# Patient Record
Sex: FEMALE | Race: WHITE | NOT HISPANIC OR LATINO | Employment: PART TIME | URBAN - METROPOLITAN AREA
[De-identification: names, ages, dates, MRNs, and addresses within clinical notes are randomized per-mention and may not be internally consistent; named-entity substitution may affect disease eponyms.]

---

## 2017-07-06 ENCOUNTER — ALLSCRIPTS OFFICE VISIT (OUTPATIENT)
Dept: OTHER | Facility: OTHER | Age: 52
End: 2017-07-06

## 2017-07-11 ENCOUNTER — GENERIC CONVERSION - ENCOUNTER (OUTPATIENT)
Dept: OTHER | Facility: OTHER | Age: 52
End: 2017-07-11

## 2017-07-11 LAB
A/G RATIO (HISTORICAL): 1.6 (ref 1.2–2.2)
ALBUMIN SERPL BCP-MCNC: 4.3 G/DL (ref 3.5–5.5)
ALP SERPL-CCNC: 97 IU/L (ref 39–117)
ALT SERPL W P-5'-P-CCNC: 23 IU/L (ref 0–32)
AST SERPL W P-5'-P-CCNC: 26 IU/L (ref 0–40)
BASOPHILS # BLD AUTO: 0 %
BASOPHILS # BLD AUTO: 0 X10E3/UL (ref 0–0.2)
BILIRUB SERPL-MCNC: 0.5 MG/DL (ref 0–1.2)
BUN SERPL-MCNC: 14 MG/DL (ref 6–24)
BUN/CREA RATIO (HISTORICAL): 14 (ref 9–23)
CALCIUM SERPL-MCNC: 9.4 MG/DL (ref 8.7–10.2)
CHLORIDE SERPL-SCNC: 98 MMOL/L (ref 96–106)
CHOLEST SERPL-MCNC: 254 MG/DL (ref 100–199)
CHOLEST/HDLC SERPL: 4.1 RATIO UNITS (ref 0–4.4)
CO2 SERPL-SCNC: 25 MMOL/L (ref 18–29)
CREAT SERPL-MCNC: 0.97 MG/DL (ref 0.57–1)
DEPRECATED RDW RBC AUTO: 13.7 % (ref 12.3–15.4)
EGFR AFRICAN AMERICAN (HISTORICAL): 77 ML/MIN/1.73
EGFR-AMERICAN CALC (HISTORICAL): 66 ML/MIN/1.73
EOSINOPHIL # BLD AUTO: 0.1 X10E3/UL (ref 0–0.4)
EOSINOPHIL # BLD AUTO: 3 %
GLUCOSE SERPL-MCNC: 100 MG/DL (ref 65–99)
HCT VFR BLD AUTO: 43.2 % (ref 34–46.6)
HDLC SERPL-MCNC: 62 MG/DL
HGB BLD-MCNC: 14.6 G/DL (ref 11.1–15.9)
IMM.GRANULOCYTES (CD4/8) (HISTORICAL): 0 %
IMM.GRANULOCYTES (CD4/8) (HISTORICAL): 0 X10E3/UL (ref 0–0.1)
INTERPRETATION (HISTORICAL): NORMAL
LDLC SERPL CALC-MCNC: 161 MG/DL (ref 0–99)
LYMPHOCYTES # BLD AUTO: 1.2 X10E3/UL (ref 0.7–3.1)
LYMPHOCYTES # BLD AUTO: 26 %
MCH RBC QN AUTO: 29.2 PG (ref 26.6–33)
MCHC RBC AUTO-ENTMCNC: 33.8 G/DL (ref 31.5–35.7)
MCV RBC AUTO: 86 FL (ref 79–97)
MONOCYTES # BLD AUTO: 0.3 X10E3/UL (ref 0.1–0.9)
MONOCYTES (HISTORICAL): 7 %
NEUTROPHILS # BLD AUTO: 2.9 X10E3/UL (ref 1.4–7)
NEUTROPHILS # BLD AUTO: 64 %
PLATELET # BLD AUTO: 240 X10E3/UL (ref 150–379)
POTASSIUM SERPL-SCNC: 4.7 MMOL/L (ref 3.5–5.2)
RBC (HISTORICAL): 5 X10E6/UL (ref 3.77–5.28)
SODIUM SERPL-SCNC: 139 MMOL/L (ref 134–144)
TOT. GLOBULIN, SERUM (HISTORICAL): 2.7 G/DL (ref 1.5–4.5)
TOTAL PROTEIN (HISTORICAL): 7 G/DL (ref 6–8.5)
TRIGL SERPL-MCNC: 153 MG/DL (ref 0–149)
VLDLC SERPL CALC-MCNC: 31 MG/DL (ref 5–40)
WBC # BLD AUTO: 4.5 X10E3/UL (ref 3.4–10.8)

## 2017-07-12 ENCOUNTER — GENERIC CONVERSION - ENCOUNTER (OUTPATIENT)
Dept: OTHER | Facility: OTHER | Age: 52
End: 2017-07-12

## 2017-11-13 DIAGNOSIS — Z12.31 ENCOUNTER FOR SCREENING MAMMOGRAM FOR MALIGNANT NEOPLASM OF BREAST: ICD-10-CM

## 2017-11-22 ENCOUNTER — HOSPITAL ENCOUNTER (OUTPATIENT)
Dept: RADIOLOGY | Facility: HOSPITAL | Age: 52
Discharge: HOME | End: 2017-11-22
Attending: INTERNAL MEDICINE
Payer: COMMERCIAL

## 2017-11-22 ENCOUNTER — GENERIC CONVERSION - ENCOUNTER (OUTPATIENT)
Dept: OTHER | Facility: OTHER | Age: 52
End: 2017-11-22

## 2017-11-22 DIAGNOSIS — Z12.31 ENCOUNTER FOR SCREENING MAMMOGRAM FOR MALIGNANT NEOPLASM OF BREAST: ICD-10-CM

## 2017-11-22 PROCEDURE — G0202 SCR MAMMO BI INCL CAD: HCPCS

## 2018-01-11 NOTE — RESULT NOTES
Discussion/Summary   Cholesterol is high (LDL should be less than 130)  Other bloodwork is normal  Please follow low fat diet and exercise  - Dr Robin Sarmiento     Verified Results  (1) CBC/PLT/DIFF [id number] 10:52AM Brit Zaragozato Babar     Test Name Result Flag Reference   WBC 4 5 x10E3/uL  3 4-10 8   RBC 5 00 x10E6/uL  3 77-5 28   Hemoglobin 14 6 g/dL  11 1-15 9   Hematocrit 43 2 %  34 0-46  6   MCV 86 fL  79-97   MCH 29 2 pg  26 6-33 0   MCHC 33 8 g/dL  31 5-35 7   RDW 13 7 %  12 3-15 4   Platelets 596 S24I0/RH  150-379   Neutrophils 64 %     Lymphs 26 %     Monocytes 7 %     Eos 3 %     Basos 0 %     Neutrophils (Absolute) 2 9 x10E3/uL  1 4-7 0   Lymphs (Absolute) 1 2 x10E3/uL  0 7-3 1   Monocytes(Absolute) 0 3 x10E3/uL  0 1-0 9   Eos (Absolute) 0 1 x10E3/uL  0 0-0 4   Baso (Absolute) 0 0 x10E3/uL  0 0-0 2   Immature Granulocytes 0 %     Immature Grans (Abs) 0 0 x10E3/uL  0 0-0 1     (1) COMPREHENSIVE METABOLIC PANEL [id number] 39:60RD Brit Zaragozato Babar     Test Name Result Flag Reference   Glucose, Serum 100 mg/dL H 65-99   BUN 14 mg/dL  6-24   Creatinine, Serum 0 97 mg/dL  0 57-1 00   BUN/Creatinine Ratio 14  9-23   Sodium, Serum 139 mmol/L  134-144   Potassium, Serum 4 7 mmol/L  3 5-5 2   Chloride, Serum 98 mmol/L     Carbon Dioxide, Total 25 mmol/L  18-29   Calcium, Serum 9 4 mg/dL  8 7-10 2   Protein, Total, Serum 7 0 g/dL  6 0-8 5   Albumin, Serum 4 3 g/dL  3 5-5 5   Globulin, Total 2 7 g/dL  1 5-4 5   A/G Ratio 1 6  1 2-2 2   Bilirubin, Total 0 5 mg/dL  0 0-1 2   Alkaline Phosphatase, S 97 IU/L     AST (SGOT) 26 IU/L  0-40   ALT (SGPT) 23 IU/L  0-32   eGFR If NonAfricn Am 66 mL/min/1 73  >59   eGFR If Africn Am 77 mL/min/1 73  >59     (1) LIPID PANEL, FASTING [id number] 10:52AM Adan Zaragoza     Test Name Result Flag Reference   Cholesterol, Total 254 mg/dL H 100-199   Triglycerides 153 mg/dL H 0-149   HDL Cholesterol 62 mg/dL  >39   VLDL Cholesterol Cecil 31 mg/dL  5-40   LDL Cholesterol Calc 161 mg/dL H 0-99   T  Chol/HDL Ratio 4 1 ratio units  0 0-4 4   T  Chol/HDL Ratio                                                             Men  Women                                               1/2 Avg  Risk  3 4    3 3                                                   Avg Risk  5 0    4 4                                                2X Avg  Risk  9 6    7 1                                                3X Avg  Risk 23 4   11 0     Elvira Scherer) Cardiovascular Risk Assessment [id number] 10:52AM Humaira Zaragoza Old     Test Name Result Flag Reference   Interpretation Note     Supplement report is available  PDF Image

## 2018-01-13 NOTE — RESULT NOTES
Verified Results  * MAMMO SCREENING BILATERAL W CAD [id number] 08:27AM Laura Colon Order Number: [id number]    Order Number: [id number]     Test Name Result Flag Reference   MAMMO SCREENING BILATERAL W CAD (Report)     Patient History:   Patient is postmenopausal    Patient has never smoked  Patient's BMI is 31 5  Reason for exam: screening (asymptomatic)  Screening     Mammo Screening Bilateral W CAD: November 12, 2016 - Check In #:    [de-identified]   Bilateral CC and MLO view(s) were taken  Technologist: RT lBake(R)(M)   Prior study comparison: November 11, 2015, [facility] screening    bilateral digital mike performed at [place] [address]  November 4, 2014, bilateral screening mammogram    performed at [place] [address]  October 15,    2013, bilateral screening mammogram performed at [place] [address]  October 13, 2012, bilateral screening    mammogram performed at [place] [address]  April 9, 2012, right breast diagnostic mammogram performed at [place] [address]  October 10, 2011, right breast   screening mammogram performed at [place] [address]  October 9, 2010, bilateral screening mammogram performed   at [place] [address]  September 21, 2009,    bilateral screening mammogram performed at [place] [address]  The breast tissue is almost entirely fat  Bilateral digital    mammography is performed  No dominant soft tissue mass,    architectural distortion or suspicious calcifications are noted  The skin and nipple contours are within normal limits  Scattered benign appearing calcifications are noted  Asymmetric    tissue in the right mid upper breast is stable  No evidence of    malignancy  No significant changes when compared to prior    studies  1  No evidence of malignancy     2  No significant change when compared with the prior study  ASSESSMENT: BiRad:2 - Benign     Recommendation:   Routine screening mammogram of both breasts in 1 year  A reminder letter will be scheduled   Analyzed by CAD     8-10% of cancers will be missed on mammography  Management of a    palpable abnormality must be based on clinical grounds  Patients   will be notified of their results via letter from our facility  Accredited by [employer] and FDA       Transcription Location: [facility] 98: [id number]     Risk Value(s):   Nishanter-Cuzick 10 Year: 2 867%, Tyrer-Cuzibertha Lifetime: 10 635%,    Myriad Table: 1 5%, RADHA 5 Year: 1 1%, NCI Lifetime: 8 6%   Signed by:   Mi Palacios MD   11/14/16       Discussion/Summary   Your mammogram is normal, please repeat yearly  - Dr Daily Hamlin

## 2018-01-14 VITALS
RESPIRATION RATE: 20 BRPM | SYSTOLIC BLOOD PRESSURE: 130 MMHG | HEIGHT: 65 IN | HEART RATE: 80 BPM | TEMPERATURE: 96.8 F | DIASTOLIC BLOOD PRESSURE: 86 MMHG

## 2018-01-14 NOTE — RESULT NOTES
Discussion/Summary   Your mammogram is normal, please repeat yearly  - Dr Carmita Milian CAD 22Nov2017 09:59AM Jeffry Madera Order Number: [id number]    - Patient Instructions: To schedule this appointment, please contact Central Scheduling at [phone]  Do not wear any perfume, powder, lotion or deodorant on breast or underarm area  Please bring your doctors order, referral (if needed) and insurance information with you on the day of the test  Failure to bring this information may result in this test being rescheduled  Arrive 15 minutes prior to your appointment time to register  On the day of your test, please bring any prior mammogram or breast studies with you that were not performed at a [facility]  Failure to bring prior exams may result in your test needing to be rescheduled  Test Name Result Flag Reference   MAMMO SCREENING BILATERAL W CAD (Report)     Patient History:   Patient is postmenopausal    Patient has never smoked  Patient's BMI is 31 5  Reason for exam: screening, asymptomatic  Screening     Mammo Screening Bilateral W CAD: November 22, 2017 - Check In #:    [de-identified]   Bilateral CC and MLO view(s) were taken  Technologist: Marely Ngo   Prior study comparison: November 12, 2016, mammo screening    bilateral W CAD performed at [place] [address]  November 11, 2015, [facility] screening bilateral digital mike    performed at [place] [address]  November 4, 2014, bilateral screening mammogram performed at [place] [address]  October 15, 2013, bilateral screening    mammogram performed at [place] [address]  October 13, 2012, bilateral screening mammogram performed at [place] [address]       The breast tissue is almost entirely fat in the left breast     There are scattered fibroglandular densities in the right breast    No dominant soft tissue mass, architectural distortion or    suspicious calcifications are noted  The skin and nipple    contours are within normal limits  No evidence of malignancy  No significant change when compared to the prior studies  1  No evidence of malignancy  2  No significant change when compared with the prior study  ACR BI-RADSï¾® Assessments: BiRad:1 - Negative     Recommendation:   Routine screening mammogram of both breasts in 1 year  Analyzed by CAD     The patient is scheduled in a reminder system for screening    mammography  8-10% of cancers will be missed on mammography  Management of a    palpable abnormality must be based on clinical grounds  Patients   will be notified of their results via letter from our facility  Accredited by [employer] and FDA  Transcription Location: [facility] 98: [id number]     Risk Value(s):   Tyrer-Cuzick 10 Year: 2 900%, Tyrer-Cuzick Lifetime: 10 300%,    Myriad Table: 1 5%, RADHA 5 Year: 1 2%, NCI Lifetime: 8 5%   Signed by:   Chasidy Arias MD   11/22/17       Plan  Visit for screening mammogram    · * MAMMO SCREENING BILATERAL W CAD ; every 1 year;  Last 80EEY5832;  Status:Active

## 2018-01-16 NOTE — PROGRESS NOTES
Assessment    1  Encounter for preventive health examination (V70 0) (Z00 00)   2  Adult BMI 34 0-34 9 kg/sq m (V85 34) (Z68 34)   3  Postgastrectomy malabsorption (579 3) (K91 2,Z90 3)    Plan  Benign essential hypertension    · (1) CBC/PLT/DIFF; Status:Active; Requested for:54Inw6393;    · (1) COMPREHENSIVE METABOLIC PANEL; Status:Active; Requested for:73Sar1670;    · (1) LIPID PANEL, FASTING; Status:Active; Requested for:73Xrm7191;    · Follow-up visit in 6 months Evaluation and Treatment  Follow-up  Status: Hold For -  Scheduling  Requested for: 84FHD7470  Menopausal disorder    · (1) FSH; Status:Active; Requested for:60Knk2688;    · (1) LH (LEUTINIZING HORMONE); Status:Active; Requested for:37Eby6656;   Postgastrectomy malabsorption    · (1) IRON PANEL; Status:Active; Requested for:56Kbd9077;    · (1) VITAMIN B12; Status:Active; Requested for:33Yow0952;    · (1) VITAMIN D 25-HYDROXY; Status:Active; Requested for:40Tme1843;   Visit for screening mammogram    · * MAMMO SCREENING BILATERAL W CAD; Status:Hold For - Scheduling; Requested  for:57Ytd3448;     Discussion/Summary  health maintenance visit Currently, she eats a healthy diet and has an inadequate exercise regimen  cervical cancer screening is managed by Dr Sylvia Muhammad Breast cancer screening: the risks and benefits of breast cancer screening were discussed, monthly self breast exam was advised and mammogram has been ordered  Colorectal cancer screening: the patient declines colorectal cancer screening  The immunizations are up to date  Advice and education were given regarding nutrition, aerobic exercise, weight loss, calcium supplements and vitamin D supplements  Patient discussion: discussed with the patient  Chief Complaint  cpe      History of Present Illness  HM, Adult Female: The patient is being seen for a health maintenance evaluation  The last health maintenance visit was 12 month(s) ago  General Health:  The patient's health since the last visit is described as good  She has regular dental visits  She denies vision problems  She denies hearing loss  Immunizations status: up to date  Lifestyle:  She consumes a diverse and healthy diet  She has weight concerns  She exercises regularly  She exercises 3 or more times per week  She does not use tobacco  She denies alcohol use  She denies drug use  Reproductive health: the patient is postmenopausal    Screening:      Review of Systems    Constitutional: No fever, no chills, feels well, no tiredness, no recent weight gain or weight loss  Eyes: No complaints of eye pain, no red eyes, no eyesight problems, no discharge, no dry eyes, no itching of eyes  ENT: no complaints of earache, no loss of hearing, no nose bleeds, no nasal discharge, no sore throat, no hoarseness  Cardiovascular: No complaints of slow heart rate, no fast heart rate, no chest pain, no palpitations, no leg claudication, no lower extremity edema  Respiratory: No complaints of shortness of breath, no wheezing, no cough, no SOB on exertion, no orthopnea, no PND  Gastrointestinal: No complaints of abdominal pain, no constipation, no nausea or vomiting, no diarrhea, no bloody stools  Genitourinary: No complaints of dysuria, no incontinence, no pelvic pain, no dysmenorrhea, no vaginal discharge or bleeding  Musculoskeletal: No complaints of arthralgias, no myalgias, no joint swelling or stiffness, no limb pain or swelling  Integumentary: No complaints of skin rash or lesions, no itching, no skin wounds, no breast pain or lump  Neurological: No complaints of headache, no confusion, no convulsions, no numbness, no dizziness or fainting, no tingling, no limb weakness, no difficulty walking  Psychiatric: Not suicidal, no sleep disturbance, no anxiety or depression, no change in personality, no emotional problems     Endocrine: No complaints of proptosis, no hot flashes, no muscle weakness, no deepening of the voice, no feelings of weakness  Hematologic/Lymphatic: No complaints of swollen glands, no swollen glands in the neck, does not bleed easily, does not bruise easily  Active Problems    1  Abnormal electrocardiogram (794 31) (R94 31)   2  Anxiety disorder (300 00) (F41 9)   3  Benign essential hypertension (401 1) (I10)   4  Contraceptives (V25 02)   5  Morbid or severe obesity due to excess calories (278 01) (E66 01)   6  Need for vaccine for TD (tetanus-diphtheria) (V06 5) (Z23)   7  Obstructive sleep apnea (327 23) (G47 33)   8  Postgastrectomy Malabsorption (579 3)   9  Pre-operative cardiovascular examination (V72 81) (Z01 810)   10  Visit for screening mammogram (V76 12) (Z12 31)    Past Medical History    · History of acute conjunctivitis (V12 49) (Z86 69)    Surgical History    · Contraceptives (V25 02)    Family History  Mother    · Family history of Hypertension (401 9) (I10)  Father    · Family history of renal failure (V18 69) (Z84 1)   · Family history of Hypertension (401 9) (I10)   · Family history of Stomach cancer (151 9) (C16 9)    Social History    · Never A Smoker    Current Meds   1  Escitalopram Oxalate 10 MG Oral Tablet; TAKE 1 TABLET DAILY; Therapy: 63CTB1639 to (Evaluate:59Asu2334)  Requested for: 89ERK5030; Last   Rx:15Jun2016 Ordered   2  Metoprolol Tartrate 25 MG Oral Tablet; take one tablet by mouth every day; Therapy: 09XDF0680 to (Evaluate:79Dau0871)  Requested for: 71FUM9089; Last   Rx:15Jun2016 Ordered    Allergies    1  No Known Drug Allergies    Vitals   Recorded: 30LPE2945 09:13AM   Temperature 98 7 F   Heart Rate 84   Respiration 18   Systolic 875   Diastolic 86   Height 5 ft 5 in   Weight 209 lb    BMI Calculated 34 78   BSA Calculated 2 02     Physical Exam    Constitutional   General appearance: No acute distress, well appearing and well nourished  Eyes   Conjunctiva and lids: No swelling, erythema or discharge  Pupils and irises: Equal, round, reactive to light      Ears, Nose, Mouth, and Throat   External inspection of ears and nose: Normal     Otoscopic examination: Tympanic membranes translucent with normal light reflex  Canals patent without erythema  Hearing: Normal     Nasal mucosa, septum, and turbinates: Normal without edema or erythema  Lips, teeth, and gums: Normal, good dentition  Oropharynx: Normal with no erythema, edema, exudate or lesions  Neck   Neck: Supple, symmetric, trachea midline, no masses  Thyroid: Normal, no thyromegaly  Pulmonary   Respiratory effort: No increased work of breathing or signs of respiratory distress  Percussion of chest: Normal     Palpation of chest: Normal     Auscultation of lungs: Clear to auscultation  Cardiovascular   Palpation of heart: Normal PMI, no thrills  Auscultation of heart: Normal rate and rhythm, normal S1 and S2, no murmurs  Carotid pulses: 2+ bilaterally  Abdominal aorta: Normal     Femoral pulses: 2+ bilaterally  Pedal pulses: 2+ bilaterally  Examination of extremities for edema and/or varicosities: Normal     Abdomen   Abdomen: Non-tender, no masses  Liver and spleen: No hepatomegaly or splenomegaly  Examination for hernias: No hernia appreciated  Lymphatic   Palpation of lymph nodes in neck: No lymphadenopathy  Palpation of lymph nodes in axillae: No lymphadenopathy  Palpation of lymph nodes in groin: No lymphadenopathy  Palpation of lymph nodes in other areas: No lymphadenopathy  Musculoskeletal   Gait and station: Normal     Digits and nails: Normal without clubbing or cyanosis  Joints, bones, and muscles: Normal     Range of motion: Normal     Stability: Normal     Muscle strength/tone: Normal     Skin   Skin and subcutaneous tissue: Normal without rashes or lesions  Palpation of skin and subcutaneous tissue: Normal turgor  Neurologic   Cranial nerves: Cranial nerves II-XII intact  Reflexes: 2+ and symmetric  Sensation: No sensory loss      Psychiatric Judgment and insight: Normal     Orientation to person, place, and time: Normal     Recent and remote memory: Intact  Mood and affect: Normal        Procedure    Procedure: Indication: routine screening     Results: 20/20 in both eyes without corrective device, 20/20 in the right eye without corrective device, 20/20 in the left eye without corrective device   Color vision was and the results were normal       Signatures   Electronically signed by : MELLO Hwang ; Jul 7 2016  9:37AM EST                       (Author)

## 2018-01-17 NOTE — RESULT NOTES
Verified Results  (1) CBC/PLT/DIFF [id number] 09:07AM Ryanne Richard     Test Name Result Flag Reference   WBC 4 8 x10E3/uL  3 4-10 8   RBC 4 58 x10E6/uL  3 77-5 28   Hemoglobin 13 8 g/dL  11 1-15 9   Hematocrit 40 2 %  34 0-46  6   MCV 88 fL  79-97   MCH 30 1 pg  26 6-33 0   MCHC 34 3 g/dL  31 5-35 7   RDW 13 8 %  12 3-15 4   Platelets 428 Z46L4/YH  150-379   Neutrophils 70 %     Lymphs 20 %     Monocytes 7 %     Eos 3 %     Basos 0 %     Neutrophils (Absolute) 3 4 x10E3/uL  1 4-7 0   Lymphs (Absolute) 1 0 x10E3/uL  0 7-3 1   Monocytes(Absolute) 0 3 x10E3/uL  0 1-0 9   Eos (Absolute) 0 1 x10E3/uL  0 0-0 4   Baso (Absolute) 0 0 x10E3/uL  0 0-0 2   Immature Granulocytes 0 %     Immature Grans (Abs) 0 0 x10E3/uL  0 0-0 1   WBC 4 8 x10E3/uL  3 4-10 8   RBC 4 58 x10E6/uL  3 77-5 28   Hemoglobin 13 8 g/dL  11 1-15 9   Hematocrit 40 2 %  34 0-46  6   MCV 88 fL  79-97   MCH 30 1 pg  26 6-33 0   MCHC 34 3 g/dL  31 5-35 7   RDW 13 8 %  12 3-15 4   Platelets 200 N23J6/AD  150-379   Neutrophils 70 %     Lymphs 20 %     Monocytes 7 %     Eos 3 %     Basos 0 %     Neutrophils (Absolute) 3 4 x10E3/uL  1 4-7 0   Lymphs (Absolute) 1 0 x10E3/uL  0 7-3 1   Monocytes(Absolute) 0 3 x10E3/uL  0 1-0 9   Eos (Absolute) 0 1 x10E3/uL  0 0-0 4   Baso (Absolute) 0 0 x10E3/uL  0 0-0 2   Immature Granulocytes 0 %     Immature Grans (Abs) 0 0 x10E3/uL  0 0-0 1           (1) COMPREHENSIVE METABOLIC PANEL 02TNK0426 41:73TM Ryanne Richard     Test Name Result Flag Reference   Glucose, Serum 96 mg/dL  65-99   BUN 11 mg/dL  6-24   Creatinine, Serum 0 88 mg/dL  0 57-1 00   eGFR If NonAfricn Am 75 mL/min/1 73  >59   eGFR If Africn Am 87 mL/min/1 73  >59   BUN/Creatinine Ratio 13  9-23   Sodium, Serum 140 mmol/L  134-144   Potassium, Serum 4 5 mmol/L  3 5-5 2   Chloride, Serum 99 mmol/L     Carbon Dioxide, Total 25 mmol/L  18-29   Calcium, Serum 9 3 mg/dL  8 7-10 2   Protein, Total, Serum 6 5 g/dL  6 0-8 5   Albumin, Serum 4 1 g/dL  3 5-5 5 Globulin, Total 2 4 g/dL  1 5-4 5   A/G Ratio 1 7  1 1-2 5   Bilirubin, Total 0 4 mg/dL  0 0-1 2   Alkaline Phosphatase, S 89 IU/L     AST (SGOT) 24 IU/L  0-40   ALT (SGPT) 22 IU/L  0-32   Glucose, Serum 96 mg/dL  65-99   BUN 11 mg/dL  6-24   Creatinine, Serum 0 88 mg/dL  0 57-1 00   eGFR If NonAfricn Am 75 mL/min/1 73  >59   eGFR If Africn Am 87 mL/min/1 73  >59   BUN/Creatinine Ratio 13  9-23   Sodium, Serum 140 mmol/L  134-144   Potassium, Serum 4 5 mmol/L  3 5-5 2   Chloride, Serum 99 mmol/L     Carbon Dioxide, Total 25 mmol/L  18-29   Calcium, Serum 9 3 mg/dL  8 7-10 2   Protein, Total, Serum 6 5 g/dL  6 0-8 5   Albumin, Serum 4 1 g/dL  3 5-5 5   Globulin, Total 2 4 g/dL  1 5-4 5   A/G Ratio 1 7  1 1-2 5   Bilirubin, Total 0 4 mg/dL  0 0-1 2   Alkaline Phosphatase, S 89 IU/L     AST (SGOT) 24 IU/L  0-40   ALT (SGPT) 22 IU/L  0-32           (1) LIPID PANEL, FASTING [id number] 09:07AM Carlos Richard     Test Name Result Flag Reference   Cholesterol, Total 239 mg/dL H 100-199   Triglycerides 135 mg/dL  0-149   HDL Cholesterol 65 mg/dL  >39   According to ATP-III Guidelines, HDL-C >59 mg/dL is considered a  negative risk factor for CHD  VLDL Cholesterol Cecil 27 mg/dL  5-40   LDL Cholesterol Calc 147 mg/dL H 0-99   T  Chol/HDL Ratio 3 7 ratio units  0 0-4 4   T  Chol/HDL Ratio                                                             Men  Women                                               1/2 Avg  Risk  3 4    3 3                                                   Avg Risk  5 0    4 4                                                2X Avg  Risk  9 6    7 1                                                3X Avg  Risk 23 4   11 0   Cholesterol, Total 239 mg/dL H 100-199   Triglycerides 135 mg/dL  0-149   HDL Cholesterol 65 mg/dL  >39   According to ATP-III Guidelines, HDL-C >59 mg/dL is considered a  negative risk factor for CHD     VLDL Cholesterol Cecil 27 mg/dL  5-40   LDL Cholesterol Calc 147 mg/dL H 0-99   T  Chol/HDL Ratio 3 7 ratio units  0 0-4 4   T  Chol/HDL Ratio                                                             Men  Women                                               1/2 Avg  Risk  3 4    3 3                                                   Avg Risk  5 0    4 4                                                2X Avg  Risk  9 6    7 1                                                3X Avg  Risk 23 4   11 0           (1) VITAMIN B12 32Kgv6603 09:07AM Radha Richard [facility]     Test Name Result Flag Reference   Vitamin B12 191 pg/mL L 211-946                 (1) VITAMIN D 25-HYDROXY [id number] 09:07AM Radha Richard [facility]     Test Name Result Flag Reference   Vitamin D, 25-Hydroxy 36 9 ng/mL  30 0-100 0   Vitamin D deficiency has been defined by the [place] of  [employer] and an Endocrine Society practice guideline as a  level of serum 25-OH vitamin D less than 20 ng/mL (1,2)  The Endocrine Society went on to further define vitamin D  insufficiency as a level between 21 and 29 ng/mL (2)  1  IOM ([place] [employer])  2010  Dietary reference     intakes for calcium and D  [facility]: The     Language Systems  2  Cristo MF, Felicia NC, Zoila LOPEZ, et al      Evaluation, treatment, and prevention of vitamin D     deficiency: an Endocrine Society clinical practice     guideline  JC  2011 Jul; 96(7):1911-30                         (1) LH (LEUTINIZING HORMONE) [id number] 09:07AM Radha Richard [facility]     Test Name Result Flag Reference   LH 22 2 mIU/mL     Follicular phase        2 4 -  12 6                                     Ovulation phase        14 0 -  95 6                                     Luteal phase            1 0 -  11 4                                     Postmenopausal          7 7 -  58 5                       (1) [address] [place] [address] [id number] 09:07AM Radha Richard [facility]     Test Name Result Flag Reference    7 mIU/mL     Follicular phase        3 5 -  12 5 Ovulation phase         4 7 -  21 5                                     Luteal phase            1 7 -   7 7                                     Postmenopausal         25 8 - 134 8                       [facility]) Cardiovascular Risk Assessment [id number] 09:07AM Nikki Richard     Test Name Result Flag Reference   Interpretation Note     Supplement report is available  PDF Image

## 2018-01-28 DIAGNOSIS — F41.9 ANXIETY: Primary | ICD-10-CM

## 2018-01-28 RX ORDER — ESCITALOPRAM OXALATE 10 MG/1
TABLET ORAL
Qty: 90 TABLET | Refills: 1 | Status: SHIPPED | OUTPATIENT
Start: 2018-01-28 | End: 2018-03-27 | Stop reason: SDUPTHER

## 2018-02-07 DIAGNOSIS — I10 BENIGN ESSENTIAL HYPERTENSION: Primary | ICD-10-CM

## 2018-03-27 DIAGNOSIS — F41.9 ANXIETY: ICD-10-CM

## 2018-03-27 DIAGNOSIS — F41.9 ANXIETY DISORDER, UNSPECIFIED TYPE: Primary | ICD-10-CM

## 2018-03-27 RX ORDER — ESCITALOPRAM OXALATE 10 MG/1
10 TABLET ORAL DAILY
Qty: 90 TABLET | Refills: 0 | Status: SHIPPED | OUTPATIENT
Start: 2018-03-27 | End: 2018-07-16 | Stop reason: SDUPTHER

## 2018-07-16 DIAGNOSIS — F41.9 ANXIETY: ICD-10-CM

## 2018-07-16 RX ORDER — ESCITALOPRAM OXALATE 10 MG/1
10 TABLET ORAL DAILY
Qty: 90 TABLET | Refills: 0 | Status: SHIPPED | OUTPATIENT
Start: 2018-07-16 | End: 2018-08-16 | Stop reason: SDUPTHER

## 2018-07-19 ENCOUNTER — OFFICE VISIT (OUTPATIENT)
Dept: FAMILY MEDICINE CLINIC | Facility: CLINIC | Age: 53
End: 2018-07-19
Payer: COMMERCIAL

## 2018-07-19 VITALS
HEART RATE: 72 BPM | WEIGHT: 208 LBS | HEIGHT: 66 IN | BODY MASS INDEX: 33.43 KG/M2 | RESPIRATION RATE: 16 BRPM | TEMPERATURE: 98.1 F | DIASTOLIC BLOOD PRESSURE: 88 MMHG | SYSTOLIC BLOOD PRESSURE: 160 MMHG

## 2018-07-19 DIAGNOSIS — K64.9 HEMORRHOIDS, UNSPECIFIED HEMORRHOID TYPE: Primary | ICD-10-CM

## 2018-07-19 DIAGNOSIS — K62.89 RECTAL PAIN: ICD-10-CM

## 2018-07-19 PROCEDURE — 3008F BODY MASS INDEX DOCD: CPT | Performed by: NURSE PRACTITIONER

## 2018-07-19 PROCEDURE — 99214 OFFICE O/P EST MOD 30 MIN: CPT | Performed by: NURSE PRACTITIONER

## 2018-07-19 NOTE — PROGRESS NOTES
Assessment/Plan:  Sitz bath and tucs  Follow up with surgeon  Supportive care discussed and advised  Follow up for no improvement and worsening of conditions  Patient advised and educated when to see immediate medical care  Diagnoses and all orders for this visit:    Hemorrhoids, unspecified hemorrhoid type  -     Ambulatory referral to General Surgery; Future  -     hydrocortisone-pramoxine (PROCTOFOAM-HC) rectal foam; Insert 1 applicator into the rectum 2 (two) times a day    Rectal pain  -     Ambulatory referral to General Surgery; Future    BMI 33 0-33 9,adult          Return if symptoms worsen or fail to improve  Subjective:      Patient ID: Luma Rivers is a 52 y o  female  Chief Complaint   Patient presents with    Hemorrhoids     painful  klm lpn       HPI   Patient stated that having discomfort at the rectal area from on and off from 4 months and has h/o hemorrhoids and stated that used OTC stuff for hemorrhoids but nothing helped  Denies any frequent bleeding and occasionally noticed scant bleeding on the tissue paper  Denies any abdominal pain, n/v/d, and constipation and weight loss  The following portions of the patient's history were reviewed and updated as appropriate: allergies, current medications, past family history, past medical history, past social history, past surgical history and problem list       Review of Systems   Constitutional: Negative  HENT: Negative  Respiratory: Negative  Cardiovascular: Negative  Gastrointestinal: Positive for rectal pain  Negative for abdominal distention, abdominal pain, anal bleeding, blood in stool, constipation, diarrhea, nausea and vomiting  Genitourinary: Negative  Skin: Negative            Objective:    History   Smoking Status    Never Smoker   Smokeless Tobacco    Never Used       Allergies: No Known Allergies    Vitals:  /88   Pulse 72   Temp 98 1 °F (36 7 °C)   Resp 16   Ht 5' 6" (1 676 m)   Wt 94 3 kg (208 lb)   BMI 33 57 kg/m²     Current Outpatient Prescriptions   Medication Sig Dispense Refill    escitalopram (LEXAPRO) 10 mg tablet TAKE 1 TABLET (10 MG TOTAL) BY MOUTH DAILY 90 tablet 0    metoprolol tartrate (LOPRESSOR) 25 mg tablet TAKE ONE TABLET BY MOUTH EVERY DAY 30 tablet 5    hydrocortisone-pramoxine (PROCTOFOAM-HC) rectal foam Insert 1 applicator into the rectum 2 (two) times a day 10 g 0     No current facility-administered medications for this visit  Physical Exam   Constitutional: She is oriented to person, place, and time  She appears well-developed and well-nourished  Cardiovascular: Normal rate, regular rhythm and normal heart sounds  Pulmonary/Chest: Effort normal and breath sounds normal    Abdominal: Soft  Normal appearance and bowel sounds are normal  There is no hepatosplenomegaly  There is no tenderness  There is no rebound, no guarding and no CVA tenderness  Hernia confirmed negative in the right inguinal area and confirmed negative in the left inguinal area  Genitourinary:         Lymphadenopathy:        Right cervical: No superficial cervical and no posterior cervical adenopathy present  Left cervical: No superficial cervical and no posterior cervical adenopathy present  Right: No inguinal and no supraclavicular adenopathy present  Left: No inguinal and no supraclavicular adenopathy present  Neurological: She is alert and oriented to person, place, and time  Skin: Skin is warm and dry  Psychiatric: She has a normal mood and affect   Her behavior is normal  Judgment and thought content normal          CLAUDIO Bunn

## 2018-07-19 NOTE — PATIENT INSTRUCTIONS
Hal bath and tucs  Follow up with surgeon  Supportive care discussed and advised  Follow up for no improvement and worsening of conditions  Patient advised and educated when to see immediate medical care

## 2018-08-10 DIAGNOSIS — I10 BENIGN ESSENTIAL HYPERTENSION: ICD-10-CM

## 2018-08-12 NOTE — PROGRESS NOTES
Assessment/Plan:    No problem-specific Assessment & Plan notes found for this encounter  Diagnoses and all orders for this visit:    Well woman exam  Comments:  Encouraged exercise and weight loss  Check labs, check pap  Mammo ordered  Colonoscopy scheduled  Orders:  -     Pap IG, Rfx HPV ASCU    Benign essential hypertension  -     POCT ECG  -     CBC and differential; Future  -     Comprehensive metabolic panel; Future  -     Lipid panel; Future    Chest discomfort  Comments:  She has no exertional symptoms  EKG unchanged  Possible GI symptoms from known hiatal hernia  If recurs and persists, patient to go to the ER  Orders:  -     POCT ECG    Mixed hyperlipidemia  -     POCT ECG  -     CBC and differential; Future  -     Comprehensive metabolic panel; Future  -     Lipid panel; Future    Screening mammogram, encounter for  -     Mammo screening bilateral w cad; Future    Anxiety  -     escitalopram (LEXAPRO) 10 mg tablet; Take 1 tablet (10 mg total) by mouth daily    Anxiety disorder, unspecified type    Other orders  -     nifedipine 0 2 % OINT; Breast Cancer Screening:   Risks and Benefits discussed, up to date with mammogram     Osteoporosis Screening:  Risks and Benefits discussed, not indicated  Colorectal Cancer Screening:  Risks and Benefits discussed, colonoscopy scheduled for October  Cervical Cancer Screening:  Risks and Benefits discussed, done by gyn  STD Testing:  Risks and Benefits discussed    Speciality Evaluation Advised:      Preventing Counseling:  Advice and education were given regarding nutrition, aerobic exercises, weight bearing exercises, cardiovascular risk reduction, fall risk reduction, and age appropriate supplements  The patient was counseled regarding instructions for management, risk factor reductions, prognosis, risks and benefits of treatment options, patient and family education, and importance of compliance with treatment  Return in about 6 months (around 2/16/2019)  Subjective:      Patient ID: Johnathan Iyer is a 52 y o  female  Visit Type: Health Maintenance    General Health: good    Dental Regular visits: up to date    Vision Problems: denies    Last Vision Examination: up to date    Hearing loss: denies    Life Style   Healthy Diet: yes  Regular Exercise: not currently, encouraged  Weight Concerns: yes  Tobacco Use: denies  Alcohol Use: occasional  Drug Use: denies        Chief Complaint   Patient presents with    Physical Exam     With pap prcma       Here for CPE  Due for pap  Has episodic symptoms of flushing, diaphoresis, and epigastric discomfort  Will last for 15 minutes and she will have to sit and rest, resolve on their own  Does not occur if she walks or exerts herself  Not related to food  Father had stent placed in his 63's  Last cholesterol was high  Mammo due November  The following portions of the patient's history were reviewed and updated as appropriate: allergies, current medications, past family history, past medical history, past social history, past surgical history and problem list       Review of Systems   Constitutional: Negative  HENT: Negative  Eyes: Negative  Respiratory: Negative  Cardiovascular: Negative  Gastrointestinal: Positive for abdominal pain  Endocrine: Negative  Genitourinary: Negative  Musculoskeletal: Negative  Skin: Negative  Allergic/Immunologic: Negative  Neurological: Negative  Hematological: Negative  Psychiatric/Behavioral: Negative            Objective:    History   Smoking Status    Never Smoker   Smokeless Tobacco    Never Used       Allergies: No Known Allergies    Vitals:  /84   Pulse 80   Temp 98 5 °F (36 9 °C)   Resp 16   Ht 5' 6" (1 676 m)   Wt 95 3 kg (210 lb)   BMI 33 89 kg/m²     Current Outpatient Prescriptions   Medication Sig Dispense Refill    escitalopram (LEXAPRO) 10 mg tablet Take 1 tablet (10 mg total) by mouth daily 90 tablet 3    metoprolol tartrate (LOPRESSOR) 25 mg tablet TAKE ONE TABLET BY MOUTH EVERY DAY 30 tablet 5    nifedipine 0 2 % OINT   2    hydrocortisone-pramoxine (PROCTOFOAM-HC) rectal foam Insert 1 applicator into the rectum 2 (two) times a day (Patient not taking: Reported on 8/16/2018 ) 10 g 0     No current facility-administered medications for this visit  Physical Exam   Constitutional: She is oriented to person, place, and time  She appears well-developed and well-nourished  No distress  HENT:   Head: Normocephalic and atraumatic  Right Ear: External ear normal    Left Ear: External ear normal    Mouth/Throat: Oropharynx is clear and moist    Eyes: EOM are normal    Neck: Normal range of motion  Neck supple  No thyromegaly present  Cardiovascular: Normal rate, regular rhythm, normal heart sounds and intact distal pulses  Exam reveals no gallop and no friction rub  No murmur heard  Pulmonary/Chest: Effort normal and breath sounds normal  She has no wheezes  She has no rales  Abdominal: Soft  Bowel sounds are normal  She exhibits no mass  There is no tenderness  There is no rebound  Genitourinary: Vagina normal and uterus normal  No breast swelling, tenderness, discharge or bleeding  There is no rash, tenderness, lesion or injury on the right labia  There is no rash, tenderness, lesion or injury on the left labia  Cervix exhibits no motion tenderness, no discharge and no friability  Right adnexum displays no mass, no tenderness and no fullness  Left adnexum displays no mass, no tenderness and no fullness  Musculoskeletal: Normal range of motion  She exhibits no deformity  Lymphadenopathy:     She has no cervical adenopathy  Neurological: She is alert and oriented to person, place, and time  She has normal reflexes  No cranial nerve deficit  She exhibits normal muscle tone  Coordination normal    Skin: Skin is warm and dry  No rash noted  She is not diaphoretic  Psychiatric: She has a normal mood and affect  Her behavior is normal  Judgment and thought content normal        EKG: IRBBB, normal sinus rhythm, unchanged from previous tracings      Gio Holliday MD

## 2018-08-16 ENCOUNTER — OFFICE VISIT (OUTPATIENT)
Dept: FAMILY MEDICINE CLINIC | Facility: CLINIC | Age: 53
End: 2018-08-16
Payer: COMMERCIAL

## 2018-08-16 VITALS
WEIGHT: 210 LBS | TEMPERATURE: 98.5 F | HEIGHT: 66 IN | HEART RATE: 80 BPM | RESPIRATION RATE: 16 BRPM | SYSTOLIC BLOOD PRESSURE: 138 MMHG | BODY MASS INDEX: 33.75 KG/M2 | DIASTOLIC BLOOD PRESSURE: 84 MMHG

## 2018-08-16 DIAGNOSIS — Z01.419 WELL WOMAN EXAM: Primary | ICD-10-CM

## 2018-08-16 DIAGNOSIS — Z12.31 SCREENING MAMMOGRAM, ENCOUNTER FOR: ICD-10-CM

## 2018-08-16 DIAGNOSIS — F41.9 ANXIETY: ICD-10-CM

## 2018-08-16 DIAGNOSIS — E78.2 MIXED HYPERLIPIDEMIA: ICD-10-CM

## 2018-08-16 DIAGNOSIS — I10 BENIGN ESSENTIAL HYPERTENSION: ICD-10-CM

## 2018-08-16 DIAGNOSIS — R07.89 CHEST DISCOMFORT: ICD-10-CM

## 2018-08-16 DIAGNOSIS — F41.9 ANXIETY DISORDER, UNSPECIFIED TYPE: ICD-10-CM

## 2018-08-16 LAB
ALBUMIN SERPL-MCNC: 4.1 G/DL (ref 3.5–5.5)
ALBUMIN/GLOB SERPL: 1.7 {RATIO} (ref 1.2–2.2)
ALP SERPL-CCNC: 87 IU/L (ref 39–117)
ALT SERPL-CCNC: 21 IU/L (ref 0–32)
AMBIG ABBREV DEFAULT: NORMAL
AMBIG ABBREV DEFAULT: NORMAL
AST SERPL-CCNC: 24 IU/L (ref 0–40)
BASOPHILS # BLD AUTO: 0 X10E3/UL (ref 0–0.2)
BASOPHILS NFR BLD AUTO: 0 %
BILIRUB SERPL-MCNC: 0.3 MG/DL (ref 0–1.2)
BUN SERPL-MCNC: 13 MG/DL (ref 6–24)
BUN/CREAT SERPL: 14 (ref 9–23)
CALCIUM SERPL-MCNC: 9.2 MG/DL (ref 8.7–10.2)
CHLORIDE SERPL-SCNC: 102 MMOL/L (ref 96–106)
CHOLEST SERPL-MCNC: 235 MG/DL (ref 100–199)
CO2 SERPL-SCNC: 24 MMOL/L (ref 20–29)
CREAT SERPL-MCNC: 0.96 MG/DL (ref 0.57–1)
EOSINOPHIL # BLD AUTO: 0.1 X10E3/UL (ref 0–0.4)
EOSINOPHIL NFR BLD AUTO: 2 %
ERYTHROCYTE [DISTWIDTH] IN BLOOD BY AUTOMATED COUNT: 14.2 % (ref 12.3–15.4)
GLOBULIN SER-MCNC: 2.4 G/DL (ref 1.5–4.5)
GLUCOSE SERPL-MCNC: 102 MG/DL (ref 65–99)
HCT VFR BLD AUTO: 39.7 % (ref 34–46.6)
HDLC SERPL-MCNC: 60 MG/DL
HGB BLD-MCNC: 13.4 G/DL (ref 11.1–15.9)
IMM GRANULOCYTES # BLD: 0 X10E3/UL (ref 0–0.1)
IMM GRANULOCYTES NFR BLD: 0 %
LDLC SERPL CALC-MCNC: 145 MG/DL (ref 0–99)
LYMPHOCYTES # BLD AUTO: 1.1 X10E3/UL (ref 0.7–3.1)
LYMPHOCYTES NFR BLD AUTO: 17 %
MCH RBC QN AUTO: 28.7 PG (ref 26.6–33)
MCHC RBC AUTO-ENTMCNC: 33.8 G/DL (ref 31.5–35.7)
MCV RBC AUTO: 85 FL (ref 79–97)
MICRODELETION SYND BLD/T FISH: NORMAL
MONOCYTES # BLD AUTO: 0.4 X10E3/UL (ref 0.1–0.9)
MONOCYTES NFR BLD AUTO: 6 %
NEUTROPHILS # BLD AUTO: 4.6 X10E3/UL (ref 1.4–7)
NEUTROPHILS NFR BLD AUTO: 75 %
PLATELET # BLD AUTO: 204 X10E3/UL (ref 150–379)
POTASSIUM SERPL-SCNC: 4.7 MMOL/L (ref 3.5–5.2)
PROT SERPL-MCNC: 6.5 G/DL (ref 6–8.5)
RBC # BLD AUTO: 4.67 X10E6/UL (ref 3.77–5.28)
SL AMB EGFR AFRICAN AMERICAN: 77 ML/MIN/1.73
SL AMB EGFR NON AFRICAN AMERICAN: 67 ML/MIN/1.73
SL AMB VLDL CHOLESTEROL CALC: 30 MG/DL (ref 5–40)
SODIUM SERPL-SCNC: 141 MMOL/L (ref 134–144)
TRIGL SERPL-MCNC: 149 MG/DL (ref 0–149)
WBC # BLD AUTO: 6.1 X10E3/UL (ref 3.4–10.8)

## 2018-08-16 PROCEDURE — 93000 ELECTROCARDIOGRAM COMPLETE: CPT | Performed by: INTERNAL MEDICINE

## 2018-08-16 PROCEDURE — 99396 PREV VISIT EST AGE 40-64: CPT | Performed by: INTERNAL MEDICINE

## 2018-08-16 RX ORDER — ESCITALOPRAM OXALATE 10 MG/1
10 TABLET ORAL DAILY
Qty: 90 TABLET | Refills: 3 | Status: SHIPPED | OUTPATIENT
Start: 2018-08-16 | End: 2019-09-12 | Stop reason: SDUPTHER

## 2018-08-20 ENCOUNTER — TELEPHONE (OUTPATIENT)
Dept: FAMILY MEDICINE CLINIC | Facility: CLINIC | Age: 53
End: 2018-08-20

## 2018-08-20 NOTE — TELEPHONE ENCOUNTER
Spoke with patient, would like to know if we could put scheduled for colonoscopy 10/05/2018   will come  form today

## 2018-08-20 NOTE — TELEPHONE ENCOUNTER
Pt was just here last Thursday and needs a form filled out for work, a wellness form  Please call when ready for , at the nurse's desk

## 2018-08-20 NOTE — TELEPHONE ENCOUNTER
She can  with the current information, or she may want to wait as I see she has colonoscopy scheduled in October  Currently form is in my folder

## 2018-08-21 LAB
CYTOLOGIST CVX/VAG CYTO: NORMAL
DX ICD CODE: NORMAL
Lab: NORMAL
OTHER STN SPEC: NORMAL
OTHER STN SPEC: NORMAL
PATH REPORT.FINAL DX SPEC: NORMAL
SL AMB NOTE:: NORMAL
SL AMB SPECIMEN ADEQUACY: NORMAL
SL AMB TEST METHODOLOGY: NORMAL

## 2018-08-25 ENCOUNTER — TELEPHONE (OUTPATIENT)
Dept: FAMILY MEDICINE CLINIC | Facility: CLINIC | Age: 53
End: 2018-08-25

## 2018-08-27 NOTE — TELEPHONE ENCOUNTER
Spoke w Pt to let her know pap is normal and letter went out per Dr Dorian Herbert    [facility]

## 2018-09-24 ENCOUNTER — ANESTHESIA EVENT (OUTPATIENT)
Dept: PERIOP | Facility: AMBULARY SURGERY CENTER | Age: 53
End: 2018-09-24
Payer: COMMERCIAL

## 2018-10-04 NOTE — ANESTHESIA PREPROCEDURE EVALUATION
Review of Systems/Medical History    Chart reviewed  History of anesthetic complications PONV    Cardiovascular  Hyperlipidemia, Hypertension controlled,    Pulmonary  Sleep apnea ,        GI/Hepatic    Bowel prep  Comment: Colon ca screening     Negative  ROS        Endo/Other    Obesity (BMI 32)    GYN  Negative gynecology ROS          Hematology  Negative hematology ROS      Musculoskeletal  Negative musculoskeletal ROS        Neurology  Negative neurology ROS      Psychology   Anxiety, Depression ,              Physical Exam    Airway    Mallampati score: III  TM Distance: >3 FB  Neck ROM: full     Dental   Comment: Chipped teeth upper left eye teeth/molar  ,     Cardiovascular      Pulmonary      Other Findings        Anesthesia Plan  ASA Score- 3     Anesthesia Type- IV sedation with anesthesia with ASA Monitors  Additional Monitors:   Airway Plan:         Plan Factors-    Induction- intravenous  Postoperative Plan-     Informed Consent- Anesthetic plan and risks discussed with patient  I personally reviewed this patient with the CRNA  Discussed and agreed on the Anesthesia Plan with the CRNA  Michael Danielson

## 2018-10-05 ENCOUNTER — HOSPITAL ENCOUNTER (OUTPATIENT)
Facility: AMBULARY SURGERY CENTER | Age: 53
Setting detail: OUTPATIENT SURGERY
Discharge: HOME | End: 2018-10-05
Attending: COLON & RECTAL SURGERY | Admitting: COLON & RECTAL SURGERY
Payer: COMMERCIAL

## 2018-10-05 ENCOUNTER — ANESTHESIA (OUTPATIENT)
Dept: PERIOP | Facility: AMBULARY SURGERY CENTER | Age: 53
End: 2018-10-05
Payer: COMMERCIAL

## 2018-10-05 VITALS
RESPIRATION RATE: 18 BRPM | SYSTOLIC BLOOD PRESSURE: 137 MMHG | BODY MASS INDEX: 32.78 KG/M2 | HEART RATE: 67 BPM | OXYGEN SATURATION: 94 % | DIASTOLIC BLOOD PRESSURE: 86 MMHG | WEIGHT: 204 LBS | HEIGHT: 66 IN | TEMPERATURE: 97.4 F

## 2018-10-05 DIAGNOSIS — K60.2 ANAL FISSURE: ICD-10-CM

## 2018-10-05 DIAGNOSIS — Z12.11 ENCOUNTER FOR SCREENING FOR MALIGNANT NEOPLASM OF COLON: ICD-10-CM

## 2018-10-05 PROCEDURE — 88305 TISSUE EXAM BY PATHOLOGIST: CPT | Performed by: PATHOLOGY

## 2018-10-05 PROCEDURE — 45385 COLONOSCOPY W/LESION REMOVAL: CPT | Performed by: COLON & RECTAL SURGERY

## 2018-10-05 RX ORDER — PROPOFOL 10 MG/ML
INJECTION, EMULSION INTRAVENOUS AS NEEDED
Status: DISCONTINUED | OUTPATIENT
Start: 2018-10-05 | End: 2018-10-05 | Stop reason: SURG

## 2018-10-05 RX ORDER — PROPOFOL 10 MG/ML
INJECTION, EMULSION INTRAVENOUS CONTINUOUS PRN
Status: DISCONTINUED | OUTPATIENT
Start: 2018-10-05 | End: 2018-10-05 | Stop reason: SURG

## 2018-10-05 RX ORDER — SODIUM CHLORIDE 9 MG/ML
125 INJECTION, SOLUTION INTRAVENOUS CONTINUOUS
Status: DISCONTINUED | OUTPATIENT
Start: 2018-10-05 | End: 2018-10-05 | Stop reason: HOSPADM

## 2018-10-05 RX ADMIN — PROPOFOL 100 MG: 10 INJECTION, EMULSION INTRAVENOUS at 08:21

## 2018-10-05 RX ADMIN — SODIUM CHLORIDE 125 ML/HR: 0.9 INJECTION, SOLUTION INTRAVENOUS at 07:33

## 2018-10-05 RX ADMIN — PROPOFOL 90 MCG/KG/MIN: 10 INJECTION, EMULSION INTRAVENOUS at 08:21

## 2018-10-05 NOTE — OP NOTE
OPERATIVE REPORT  PATIENT NAME: Bentley Sam    :  1965  MRN: [id number]  Pt Location: [facility]  GI ROOM 01    SURGERY DATE: 10/5/2018    Surgeon(s) and Role:     Elin Hernandez MD - Primary    Preop Diagnosis:  Encounter for screening for malignant neoplasm of colon [Z12 11]  Anal fissure [K60 2]    Post-Op Diagnosis Codes:     * Encounter for screening for malignant neoplasm of colon [Z12 11]     * Anal fissure [K60 2]    Procedure(s) (LRB):  COLONOSCOPY (N/A)    Specimen(s):  ID Type Source Tests Collected by Time Destination   1 : Hot snare polypectomy sigmoid Tissue Polyp, Colorectal TISSUE EXAM Sachi Harrington MD 10/5/2018 8574        Estimated Blood Loss:   Minimal    Drains:       Anesthesia Type:   IV Sedation with Anesthesia    Operative Indications:  Encounter for screening for malignant neoplasm of colon [Z12 11]  Anal fissure [K60 2]    Operative Findings:  Normal colon and rectum  Anal papilla  Sigmoid colon polyp    Complications:   None    Procedure and Technique:  Digital rectal exam revealed an anal papilla and healing post anal fissure  It was otherwise unremarkable  The colonoscope was inserted and advanced to the cecum with ease  The confluence of the tenia coli, appendiceal orifice, and ileocecal valve were clearly seen and photographed  Upon withdrawal of the scope, a proximal sigmoid colon polyp was identified  This was 1 2 cm in diameter and was flat  It was removed with hot snare technique  The remainder of the study was unremarkable  Retroflexion in the rectum revealed an anal papilla  I tried to remove this with a snare but was and able to do so, as the papilla was well and the anal canal     The patient tolerated the procedure well  I was present for the entire procedure    Patient Disposition:  APU     Plan: Colonosccopy in 5 years due to family history of GI cancer      SIGNATURE: Sachi Harrington MD  DATE: 2018  TIME: 8:42 AM

## 2018-10-05 NOTE — ANESTHESIA POSTPROCEDURE EVALUATION
Post-Op Assessment Note      CV Status:  Stable    Mental Status:  Alert and awake    Hydration Status:  Euvolemic    PONV Controlled:  Controlled    Airway Patency:  Patent    Post Op Vitals Reviewed: Yes          Staff: CRNA           BP   120/74   Temp      Pulse  72   Resp   18   SpO2   99

## 2018-10-05 NOTE — H&P
History and Physical   Colon and Rectal Surgery   Robe Ruiz 52 y o  female MRN: [id number]  Unit/Bed#: OR POOL Encounter: [id number]  10/05/18   8:16 AM      CC: Screening colon  History of Present Illness   HPI:  Robe Ruiz is a 52 y o  female with no GI symptoms  She had anal pain and bleeding that responded to diet and Nifedipine  She feels much better      Historical Information   Past Medical History:   Diagnosis Date    Anal fissure     Anxiety     Depression     History of snoring     Hypertension      Past Surgical History:   Procedure Laterality Date     SECTION      GASTRIC BYPASS      WISDOM TOOTH EXTRACTION         Meds/Allergies     Prescriptions Prior to Admission   Medication    escitalopram (LEXAPRO) 10 mg tablet    metoprolol tartrate (LOPRESSOR) 25 mg tablet    nifedipine 0 2 % OINT         Current Facility-Administered Medications:     sodium chloride 0 9 % infusion, 125 mL/hr, Intravenous, Continuous, Marcus Jackson MD, Last Rate: 125 mL/hr at 10/05/18 0733, 125 mL/hr at 10/05/18 0733    No Known Allergies      Social History   History   Alcohol Use    Yes     Comment: 1 x / month     History   Drug Use No     History   Smoking Status    Never Smoker   Smokeless Tobacco    Never Used         Family History:   Family History   Problem Relation Age of Onset    Hypertension Mother     Hypertension Father     Other Father         Renal failure    Stomach cancer Father     Heart disease Father          Objective     Current Vitals:   Blood Pressure: 157/87 (10/05/18 0725)  Pulse: 84 (10/05/18 0725)  Temperature: 97 9 °F (36 6 °C) (10/05/18 0725)  Temp Source: Temporal (10/05/18 0725)  Respirations: 18 (10/05/18 0725)  Height: 5' 6" (167 6 cm) (10/05/18 0725)  Weight - Scale: 92 5 kg (204 lb) (10/05/18 0725)  SpO2: 98 % (10/05/18 0725)  No intake or output data in the 24 hours ending 10/05/18 0816    Physical Exam:  General:  Well nourished, no distress  Neuro: Alert and oriented  Eyes:Sclera anicteric, conjunctiva pink  Pulm: Clear to auscultation bilaterally  No respiratory Distress  CV:  Regular rate and rhythm  No murmurs  Abdomen:  Soft, flat, non-tender, without masses or hepatosplenomegaly  Lab Results:       ASSESSMENT:  Cristal Rivas is a 52 y o  female for   PLAN:  Colonoscopy  Risks , including, but not limited to, bleeding, perforation, missed lesions, and potential need for surgery, were reviewed  Alternatives to colonoscopy were discussed    Joshua Castillo MD

## 2018-11-16 DIAGNOSIS — F41.9 ANXIETY: ICD-10-CM

## 2018-11-19 RX ORDER — ESCITALOPRAM OXALATE 10 MG/1
TABLET ORAL
Qty: 90 TABLET | Refills: 1 | Status: SHIPPED | OUTPATIENT
Start: 2018-11-19 | End: 2019-02-13

## 2018-11-26 ENCOUNTER — HOSPITAL ENCOUNTER (OUTPATIENT)
Dept: RADIOLOGY | Facility: HOSPITAL | Age: 53
Discharge: HOME | End: 2018-11-26
Attending: INTERNAL MEDICINE
Payer: COMMERCIAL

## 2018-11-26 VITALS — BODY MASS INDEX: 32.14 KG/M2 | WEIGHT: 200 LBS | HEIGHT: 66 IN

## 2018-11-26 DIAGNOSIS — Z12.31 SCREENING MAMMOGRAM, ENCOUNTER FOR: ICD-10-CM

## 2018-11-26 PROCEDURE — 77067 SCR MAMMO BI INCL CAD: CPT

## 2019-02-11 PROBLEM — K60.30 ANAL FISTULA: Status: ACTIVE | Noted: 2019-02-11

## 2019-02-11 PROBLEM — K60.3 ANAL FISTULA: Status: ACTIVE | Noted: 2019-02-11

## 2019-02-11 PROBLEM — K60.2 ANAL FISSURE: Status: ACTIVE | Noted: 2019-02-11

## 2019-02-11 PROBLEM — K61.0 ANAL ABSCESS: Status: ACTIVE | Noted: 2019-02-11

## 2019-02-13 NOTE — PRE-PROCEDURE INSTRUCTIONS
Pre-Surgery Instructions:   Medication Instructions    escitalopram (LEXAPRO) 10 mg tablet Instructed patient per Anesthesia Guidelines   metoprolol tartrate (LOPRESSOR) 25 mg tablet Instructed patient per Anesthesia Guidelines      Pre op and showering instructions using an antibacterial soap reviewed-Patient instructed to follow Dr Arben Willis Bowel Prep of [facility]

## 2019-02-14 ENCOUNTER — ANESTHESIA EVENT (OUTPATIENT)
Dept: PERIOP | Facility: HOSPITAL | Age: 54
End: 2019-02-14
Payer: COMMERCIAL

## 2019-02-15 ENCOUNTER — HOSPITAL ENCOUNTER (OUTPATIENT)
Facility: HOSPITAL | Age: 54
Setting detail: OUTPATIENT SURGERY
Discharge: HOME | End: 2019-02-15
Attending: COLON & RECTAL SURGERY | Admitting: COLON & RECTAL SURGERY
Payer: COMMERCIAL

## 2019-02-15 ENCOUNTER — ANESTHESIA (OUTPATIENT)
Dept: PERIOP | Facility: HOSPITAL | Age: 54
End: 2019-02-15
Payer: COMMERCIAL

## 2019-02-15 VITALS
OXYGEN SATURATION: 100 % | HEIGHT: 66 IN | RESPIRATION RATE: 27 BRPM | DIASTOLIC BLOOD PRESSURE: 73 MMHG | TEMPERATURE: 97.8 F | BODY MASS INDEX: 33.11 KG/M2 | SYSTOLIC BLOOD PRESSURE: 152 MMHG | HEART RATE: 60 BPM | WEIGHT: 206 LBS

## 2019-02-15 DIAGNOSIS — K60.3 ANAL FISTULA: ICD-10-CM

## 2019-02-15 DIAGNOSIS — K60.2 ANAL FISSURE: Primary | ICD-10-CM

## 2019-02-15 PROCEDURE — 88304 TISSUE EXAM BY PATHOLOGIST: CPT | Performed by: PATHOLOGY

## 2019-02-15 PROCEDURE — 46270 REMOVE ANAL FIST SUBQ: CPT | Performed by: COLON & RECTAL SURGERY

## 2019-02-15 PROCEDURE — 46220 EXCISE ANAL EXT TAG/PAPILLA: CPT | Performed by: COLON & RECTAL SURGERY

## 2019-02-15 RX ORDER — OXYCODONE HYDROCHLORIDE AND ACETAMINOPHEN 5; 325 MG/1; MG/1
2 TABLET ORAL EVERY 4 HOURS PRN
Status: DISCONTINUED | OUTPATIENT
Start: 2019-02-15 | End: 2019-02-15 | Stop reason: HOSPADM

## 2019-02-15 RX ORDER — PROPOFOL 10 MG/ML
INJECTION, EMULSION INTRAVENOUS CONTINUOUS PRN
Status: DISCONTINUED | OUTPATIENT
Start: 2019-02-15 | End: 2019-02-15 | Stop reason: SURG

## 2019-02-15 RX ORDER — SODIUM CHLORIDE 9 MG/ML
INJECTION, SOLUTION INTRAVENOUS CONTINUOUS PRN
Status: DISCONTINUED | OUTPATIENT
Start: 2019-02-15 | End: 2019-02-15 | Stop reason: SURG

## 2019-02-15 RX ORDER — PROPOFOL 10 MG/ML
INJECTION, EMULSION INTRAVENOUS AS NEEDED
Status: DISCONTINUED | OUTPATIENT
Start: 2019-02-15 | End: 2019-02-15 | Stop reason: SURG

## 2019-02-15 RX ORDER — ONDANSETRON 2 MG/ML
4 INJECTION INTRAMUSCULAR; INTRAVENOUS ONCE AS NEEDED
Status: DISCONTINUED | OUTPATIENT
Start: 2019-02-15 | End: 2019-02-15 | Stop reason: HOSPADM

## 2019-02-15 RX ORDER — MAGNESIUM HYDROXIDE 1200 MG/15ML
LIQUID ORAL AS NEEDED
Status: DISCONTINUED | OUTPATIENT
Start: 2019-02-15 | End: 2019-02-15 | Stop reason: HOSPADM

## 2019-02-15 RX ORDER — BUPIVACAINE HYDROCHLORIDE AND EPINEPHRINE 5; 5 MG/ML; UG/ML
INJECTION, SOLUTION PERINEURAL AS NEEDED
Status: DISCONTINUED | OUTPATIENT
Start: 2019-02-15 | End: 2019-02-15 | Stop reason: HOSPADM

## 2019-02-15 RX ORDER — FENTANYL CITRATE 50 UG/ML
INJECTION, SOLUTION INTRAMUSCULAR; INTRAVENOUS AS NEEDED
Status: DISCONTINUED | OUTPATIENT
Start: 2019-02-15 | End: 2019-02-15 | Stop reason: SURG

## 2019-02-15 RX ORDER — SODIUM CHLORIDE 9 MG/ML
50 INJECTION, SOLUTION INTRAVENOUS CONTINUOUS
Status: CANCELLED | OUTPATIENT
Start: 2019-02-15

## 2019-02-15 RX ORDER — FENTANYL CITRATE/PF 50 MCG/ML
25 SYRINGE (ML) INJECTION
Status: DISCONTINUED | OUTPATIENT
Start: 2019-02-15 | End: 2019-02-15 | Stop reason: HOSPADM

## 2019-02-15 RX ORDER — MIDAZOLAM HYDROCHLORIDE 1 MG/ML
INJECTION INTRAMUSCULAR; INTRAVENOUS AS NEEDED
Status: DISCONTINUED | OUTPATIENT
Start: 2019-02-15 | End: 2019-02-15 | Stop reason: SURG

## 2019-02-15 RX ORDER — OXYCODONE HYDROCHLORIDE AND ACETAMINOPHEN 5; 325 MG/1; MG/1
2 TABLET ORAL EVERY 6 HOURS PRN
Qty: 30 TABLET | Refills: 0 | Status: SHIPPED | OUTPATIENT
Start: 2019-02-15 | End: 2019-02-25

## 2019-02-15 RX ORDER — SODIUM CHLORIDE, SODIUM LACTATE, POTASSIUM CHLORIDE, CALCIUM CHLORIDE 600; 310; 30; 20 MG/100ML; MG/100ML; MG/100ML; MG/100ML
125 INJECTION, SOLUTION INTRAVENOUS CONTINUOUS
Status: DISCONTINUED | OUTPATIENT
Start: 2019-02-15 | End: 2019-02-15 | Stop reason: HOSPADM

## 2019-02-15 RX ADMIN — PROPOFOL 80 MCG/KG/MIN: 10 INJECTION, EMULSION INTRAVENOUS at 11:27

## 2019-02-15 RX ADMIN — SODIUM CHLORIDE: 0.9 INJECTION, SOLUTION INTRAVENOUS at 11:22

## 2019-02-15 RX ADMIN — PROPOFOL 30 MG: 10 INJECTION, EMULSION INTRAVENOUS at 11:27

## 2019-02-15 RX ADMIN — MIDAZOLAM HYDROCHLORIDE 2 MG: 1 INJECTION, SOLUTION INTRAMUSCULAR; INTRAVENOUS at 11:22

## 2019-02-15 RX ADMIN — FENTANYL CITRATE 50 MCG: 50 INJECTION INTRAMUSCULAR; INTRAVENOUS at 11:25

## 2019-02-15 NOTE — OP NOTE
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]  External hemorrhoids  Anal fissure, large  Hypertrophied anal papillae    Procedure(s) (LRB):  FISTULOTOMY, EUA, Fissurectomy, removal of external hemorrhoids (N/A), removal anal papilla    Specimen(s):  ID Type Source Tests Collected by Time Destination   1 : external hemorrhoid Tissue Hemorrhoids TISSUE EXAM Michael Quiroz MD 2/15/2019 1133    2 : anal fissure Tissue Rectum TISSUE EXAM Michael Quiroz MD 2/15/2019 1134    3 : anal papilla Tissue Rectum TISSUE EXAM Michael Quiroz MD 2/15/2019 1135        Estimated Blood Loss:   Minimal    Drains:  * No LDAs found *    Anesthesia Type:   IV Sedation with Anesthesia    Operative Indications:  Anal fistula [K60 3]  External hemorrhoids  Anal fissure, large  Hypertrophied anal papillae    Operative Findings:     * Anal fistula [K60 3]  External hemorrhoids  Anal fissure, large  Hypertrophied anal papillae    Complications:   None    Procedure and Technique:  The patient was placed in a prone apolonia-knife position with the buttocks taped apart  The anal area was prepped using Betadine anal draped in a sterile manner  Time-out was done  Anal inspection revealed a post anal nodule at the site that had been draining purulent material at the office a few days ago  It was uninflamed other than the nodularity of the tissue  Digital rectal exam revealed post anal scarring  Anoscopic examination revealed a large hypertrophied anal papilla at the proximal end of a very wide, 1 5 cm wide fissure in the post anal midline  Probing revealed minimal fistula formation at the proximal end of the fissure  These sub mm fistulae in the mucosa were unroofed    The hypertrophied anal papilla was removed and cauterized  It was sent for pathologic evaluation  Attention was brought to the more external fissure  Here, a hook probe could be passed beneath a subcutaneous fistula that involved the sub cutaneous anal sphincter  The internal anal sphincter was not involved  The hook probe was passed through this area and the skin was opened to perform a fistulotomy  A heaped up nodular external hemorrhoid was excised and sent for pathologic evaluation  The edge of the fissure at that fistulotomy region was also sent for pathologic evaluation  The edges of the fistulotomy were cauterized as was the base  Probing with lacrimal duct probes revealed no other fistula extension or tracking  The base of the fissure was then evaluated  The fissure was very well-established and chronic and had a significant re-epithelialization at its base  Only area that was under the distal fistula at and distal to the anal verge was not covered with the new epithelium  I merely cauterized the base of the fistulotomy site and did not treat long fissure specifically  I suspect this area will heal on its own, without need for of lateral internal sphincterotomy  The edges of the fissure were relatively thick with mucosa but were not any of any further therapy  The wounds were inspected for hemostasis and was found to be dry        I was present for the entire procedure    Patient Disposition:  APU    SIGNATURE: Vanessa Pierson MD  DATE: February 15, 2019  TIME: 12:06 PM

## 2019-02-15 NOTE — ANESTHESIA POSTPROCEDURE EVALUATION
Post-Op Assessment Note    CV Status:  Stable  Pain Score: 0    Pain management: adequate     Mental Status:  Alert and awake   Hydration Status:  Euvolemic   PONV Controlled:  Controlled   Airway Patency:  Patent   Post Op Vitals Reviewed: Yes      Staff: CRNA           BP   122/66   Temp   98 2   Pulse  65   Resp   20   SpO2   98

## 2019-02-15 NOTE — DISCHARGE INSTRUCTIONS
Hemorrhoidectomy   WHAT YOU NEED TO KNOW:   A hemorrhoidectomy is surgery to remove a hemorrhoid  DISCHARGE INSTRUCTIONS:   Call 911 for any of the following:   · You have trouble breathing  Seek care immediately if:   · Blood soaks through your bandage or underwear  · Your stitches come apart  · You have severe pain in your rectum or abdomen  · You cannot urinate, or you urinate very little  Contact your healthcare provider if:   · You have a fever or chills  · Your pain does not get better after you take pain medicine  · You do not have a bowel movement within 48 hours after surgery  · You have severe pain when you have a bowel movement  · Your wound is red, swollen, or draining pus  · You have nausea or are vomiting  · Your skin is itchy, swollen, or you have a rash  · You have trouble controlling your bowel movements  · You have questions or concerns about your condition or care  Medicines: You may need any of the following:  · Medicine  may be given to decrease pain and swelling  The medicine may come as a pad, cream, or ointment  · Antibiotics  help prevent a bacterial infection  They may be given as a pill or an ointment  · Stool softeners  help prevent constipation  · Laxatives  help you have a bowel movement and prevent constipation  · Prescription pain medicine  may be given  Ask your healthcare provider how to take this medicine safely  Some prescription pain medicines contain acetaminophen  Do not take other medicines that contain acetaminophen without talking to your healthcare provider  Too much acetaminophen may cause liver damage  Prescription pain medicine may cause constipation  Ask your healthcare provider how to prevent or treat constipation  · NSAIDs , such as ibuprofen, help decrease swelling, pain, and fever  NSAIDs can cause stomach bleeding or kidney problems in certain people   If you take blood thinner medicine, always ask your healthcare provider if NSAIDs are safe for you  Always read the medicine label and follow directions  · Take your medicine as directed  Contact your healthcare provider if you think your medicine is not helping or if you have side effects  Tell him or her if you are allergic to any medicine  Keep a list of the medicines, vitamins, and herbs you take  Include the amounts, and when and why you take them  Bring the list or the pill bottles to follow-up visits  Carry your medicine list with you in case of an emergency  Care for your wound as directed:   · Remove your bandage or packing as directed  Carefully wash around the wound with soap and water  It is okay to let soap and water gently run over your incision  Gently pat the area dry  Apply ointment or cream as directed  Put on new, clean bandages as directed  Change your bandages when they get wet or dirty  · Keep your anal area clean  After a bowel movement, wipe with moist towelettes or wet toilet paper  Dry toilet paper can irritate the area  Wear a sanitary pad to absorb bleeding and keep the area clean and dry  Self-care:   · Apply ice on your anus for 15 to 20 minutes every hour or as directed  Use an ice pack, or put crushed ice in a plastic bag  Cover it with a towel before you put it on your skin  Ice helps prevent tissue damage and decreases swelling and pain  · Take a sitz bath  A sitz bath can help decrease pain and swelling  Do this 3 times a day, and after each bowel movement  Fill a bathtub with 4 to 6 inches of warm water  You may also use a sitz bath pan that fits inside a toilet bowl  Sit in the sitz bath for 15 minutes  · Sit on a pillow or a donut-shaped cushion  This helps relieve pressure and pain on your incision  Ask your healthcare provider where to buy a donut-shaped cushion  If you have pain when you sit, lie on your side  · Do not have anal sex  Anal sex can cause your stitches to come apart   Ask your healthcare provider how long you need to follow these instructions  · Do not lift anything heavier than 5 pounds  This can increase pressure in your rectum or anus and cause your incision to come apart  Prevent constipation:  You should try to have a bowel movement within 48 hours after surgery  Constipation can cause pain and put pressure on your incision  Do the following to prevent constipation:  · Drink plenty of liquids  Liquids can help prevent constipation and straining  Ask how much liquid to drink each day and which liquids are best for you  · Eat a variety of high-fiber foods  This will help make it easier to have a bowel movement  Examples include fruits, vegetables, and whole grains  Ask your healthcare provider how much fiber you need each day  You may need to take a fiber supplement  · Exercise as directed  Exercise, such as walking, may make it easier to have a bowel movement  Ask your healthcare provider what exercises are safe for you to do after surgery  Follow up with your healthcare provider as directed:  Write down your questions so you remember to ask them during your visits  © 2017 [facility] Information is for End User's use only and may not be sold, redistributed or otherwise used for commercial purposes  All illustrations and images included in CareNotes® are the copyrighted property of A D A M , Inc  or Yusef Irving  The above information is an  only  It is not intended as medical advice for individual conditions or treatments  Talk to your doctor, nurse or pharmacist before following any medical regimen to see if it is safe and effective for you

## 2019-02-15 NOTE — ANESTHESIA PREPROCEDURE EVALUATION
Review of Systems/Medical History  Patient summary reviewed  Chart reviewed  History of anesthetic complications PONV    Cardiovascular  Exercise tolerance (METS): >4,  Hyperlipidemia, Hypertension ,    Pulmonary  No sleep apnea (pt had CPAP after gastric bypass but no longer needs) ,        GI/Hepatic    Bariatric surgery (8 years ago),        Negative  ROS        Endo/Other    Obesity (BMI 33)    GYN  Negative gynecology ROS          Hematology  Negative hematology ROS      Musculoskeletal  Negative musculoskeletal ROS        Neurology  Negative neurology ROS      Psychology   Anxiety, Depression ,            Physical Exam    Airway    Mallampati score: I  TM Distance: >3 FB  Neck ROM: full     Dental   No notable dental hx     Cardiovascular      Pulmonary      Other Findings       No recent labs    Anesthesia Plan  ASA Score- 2     Anesthesia Type- IV sedation with anesthesia with ASA Monitors  Additional Monitors:   Airway Plan:         Plan Factors-    Induction- intravenous  Postoperative Plan-     Informed Consent- Anesthetic plan and risks discussed with patient  I personally reviewed this patient with the CRNA  Discussed and agreed on the Anesthesia Plan with the CRNA  Emma Barragan

## 2019-03-11 DIAGNOSIS — I10 BENIGN ESSENTIAL HYPERTENSION: ICD-10-CM

## 2019-03-11 NOTE — TELEPHONE ENCOUNTER
Pt left a message on refill hotline for a refill on her metoprolol tartrate 25 mg 1 daily sent to [facility]   [place] [address], [address]

## 2019-04-15 PROBLEM — K56.41 FECAL IMPACTION (HCC): Status: ACTIVE | Noted: 2019-04-15

## 2019-08-12 DIAGNOSIS — I10 BENIGN ESSENTIAL HYPERTENSION: ICD-10-CM

## 2019-09-09 NOTE — PROGRESS NOTES
FAMILY PRACTICE HEALTH MAINTENANCE OFFICE VISIT  [facility] - [address]    NAME: Jose Avalos  AGE: 62 y o  SEX: female  : 1965     DATE: 2019    Assessment and Plan     Problem List Items Addressed This Visit        Cardiovascular and Mediastinum    Benign essential hypertension    Relevant Medications    metoprolol tartrate (LOPRESSOR) 25 mg tablet    Other Relevant Orders    CBC and differential    Comprehensive metabolic panel    Lipid panel       Other    Mixed hyperlipidemia    Relevant Orders    CBC and differential    Comprehensive metabolic panel    Lipid panel      Other Visit Diagnoses     Well adult exam    -  Primary    Encounter for hepatitis C screening test for low risk patient        Relevant Orders    Hepatitis C antibody    Screening breast examination        Relevant Orders    Mammo screening bilateral w cad    Anxiety        Relevant Medications    escitalopram (LEXAPRO) 10 mg tablet    Epigastric abdominal pain        She was advised GI evaluation with probable EGD if symptoms recur  BMI 33 0-33 9,adult                · Patient Counseling:   · Nutrition: Stressed importance of a well balanced diet, moderation of sodium/saturated fat, caloric balance and sufficient intake of fiber  · Dental Health: Discussed daily flossing and brushing and regular dental visits     · Immunizations reviewed: Up To Date  · Discussed benefits of:  Colon Cancer Screening, Mammogram  and Cervical Cancer screening   BMI Counseling: Body mass index is 33 57 kg/m²  Discussed with patient's BMI with her  The BMI is above normal  Nutrition recommendations include reducing portion sizes and decreasing overall calorie intake  Exercise recommendations include moderate aerobic physical activity for 150 minutes/week  Return in about 6 months (around 3/12/2020)          Chief Complaint     Chief Complaint   Patient presents with    Physical Exam     ac/cma        History of Present Illness     Here for CPE  Feels well  Up to date with pap, mammo, colonoscopy  Since her bariatric surgery will get occasional epigastric pain that makes her sweat  It is never exertional, seems to have been related to eating  She did discuss with a GI doctor who told her to take pepcid daily and it has not recurred  Well Adult Physical   Patient here for a comprehensive physical exam       Diet and Physical Activity  Diet: well balanced diet  Exercise: frequently      Depression Screen  PHQ-9 Depression Screening    PHQ-9:    Frequency of the following problems over the past two weeks:       Little interest or pleasure in doing things:  0 - not at all  Feeling down, depressed, or hopeless:  0 - not at all  PHQ-2 Score:  0          General Health  Hearing: Normal:  bilateral  Vision: no vision problems  Dental: regular dental visits    Reproductive Health  No issues       The following portions of the patient's history were reviewed and updated as appropriate: allergies, current medications, past family history, past medical history, past social history, past surgical history and problem list     Review of Systems     Review of Systems   Constitutional: Negative  HENT: Negative  Eyes: Negative  Respiratory: Negative  Cardiovascular: Negative  Gastrointestinal: Negative  Endocrine: Negative  Genitourinary: Negative  Musculoskeletal: Negative  Skin: Negative  Allergic/Immunologic: Negative  Neurological: Negative  Hematological: Negative  Psychiatric/Behavioral: Negative          Past Medical History     Past Medical History:   Diagnosis Date    Anal fissure     Anxiety     Depression     History of snoring     Hypertension     PONV (postoperative nausea and vomiting)        Past Surgical History     Past Surgical History:   Procedure Laterality Date     SECTION      GASTRIC BYPASS      NC COLONOSCOPY FLX DX W/COLLJ SPEC WHEN PFRMD N/A 10/5/2018 Procedure: COLONOSCOPY;  Surgeon: Catracho Triana MD;  Location: [facility];   Service: Colorectal    HI REMOVAL ANAL FISTULA,SUBCUTANEOUS N/A 2/15/2019    Procedure: FISTULOTOMY, EUA, Fissurectomy, removal of external hemorrhoids;  Surgeon: Catracho Triana MD;  Location: [facility];  Service: Colorectal    WISDOM TOOTH EXTRACTION         Social History     Social History     Socioeconomic History    Marital status: /Civil Union     Spouse name: None    Number of children: None    Years of education: None    Highest education level: None   Occupational History    None   Social Needs    Financial resource strain: None    Food insecurity:     Worry: None     Inability: None    Transportation needs:     Medical: None     Non-medical: None   Tobacco Use    Smoking status: Never Smoker    Smokeless tobacco: Never Used   Substance and Sexual Activity    Alcohol use: Yes     Frequency: Monthly or less     Drinks per session: 1 or 2     Binge frequency: Never     Comment: Socially    Drug use: No    Sexual activity: None   Lifestyle    Physical activity:     Days per week: None     Minutes per session: None    Stress: None   Relationships    Social connections:     Talks on phone: None     Gets together: None     Attends [employer] service: None     Active member of club or organization: None     Attends meetings of clubs or organizations: None     Relationship status: None    Intimate partner violence:     Fear of current or ex partner: None     Emotionally abused: None     Physically abused: None     Forced sexual activity: None   Other Topics Concern    None   Social History Narrative    None       Family History     Family History   Problem Relation Age of Onset    Hypertension Mother     Hypertension Father     Other Father         Renal failure    Stomach cancer Father     Heart disease Father        Current Medications       Current Outpatient Medications:     escitalopram (LEXAPRO) 10 mg tablet, Take 1 tablet (10 mg total) by mouth daily, Disp: 90 tablet, Rfl: 1    metoprolol tartrate (LOPRESSOR) 25 mg tablet, Take 1 tablet (25 mg total) by mouth daily, Disp: 90 tablet, Rfl: 1     Allergies     No Known Allergies    Objective     /80   Pulse 76   Temp 98 °F (36 7 °C)   Resp 16   Ht 5' 6" (1 676 m)   Wt 94 3 kg (208 lb)   BMI 33 57 kg/m²      Physical Exam   Constitutional: She is oriented to person, place, and time  She appears well-developed and well-nourished  No distress  HENT:   Head: Normocephalic and atraumatic  Right Ear: External ear normal    Left Ear: External ear normal    Mouth/Throat: Oropharynx is clear and moist    Eyes: EOM are normal    Neck: Normal range of motion  Neck supple  No thyromegaly present  Cardiovascular: Normal rate, regular rhythm, normal heart sounds and intact distal pulses  Exam reveals no gallop and no friction rub  No murmur heard  Pulmonary/Chest: Effort normal and breath sounds normal  She has no wheezes  She has no rales  Abdominal: Soft  Bowel sounds are normal  She exhibits no mass  There is no tenderness  There is no rebound  Musculoskeletal: Normal range of motion  She exhibits no deformity  Lymphadenopathy:     She has no cervical adenopathy  Neurological: She is alert and oriented to person, place, and time  She has normal reflexes  She displays normal reflexes  No cranial nerve deficit  She exhibits normal muscle tone  Coordination normal    Skin: Skin is warm and dry  No rash noted  She is not diaphoretic  Psychiatric: She has a normal mood and affect   Her behavior is normal  Judgment and thought content normal          No exam data present        [facility]

## 2019-09-12 ENCOUNTER — OFFICE VISIT (OUTPATIENT)
Dept: FAMILY MEDICINE CLINIC | Facility: CLINIC | Age: 54
End: 2019-09-12
Payer: COMMERCIAL

## 2019-09-12 VITALS
WEIGHT: 208 LBS | SYSTOLIC BLOOD PRESSURE: 140 MMHG | BODY MASS INDEX: 33.43 KG/M2 | DIASTOLIC BLOOD PRESSURE: 80 MMHG | HEART RATE: 76 BPM | RESPIRATION RATE: 16 BRPM | TEMPERATURE: 98 F | HEIGHT: 66 IN

## 2019-09-12 DIAGNOSIS — R10.13 EPIGASTRIC ABDOMINAL PAIN: ICD-10-CM

## 2019-09-12 DIAGNOSIS — Z12.39 SCREENING BREAST EXAMINATION: ICD-10-CM

## 2019-09-12 DIAGNOSIS — Z11.59 ENCOUNTER FOR HEPATITIS C SCREENING TEST FOR LOW RISK PATIENT: ICD-10-CM

## 2019-09-12 DIAGNOSIS — Z00.00 WELL ADULT EXAM: Primary | ICD-10-CM

## 2019-09-12 DIAGNOSIS — F41.9 ANXIETY: ICD-10-CM

## 2019-09-12 DIAGNOSIS — I10 BENIGN ESSENTIAL HYPERTENSION: ICD-10-CM

## 2019-09-12 DIAGNOSIS — E78.2 MIXED HYPERLIPIDEMIA: ICD-10-CM

## 2019-09-12 PROCEDURE — 99396 PREV VISIT EST AGE 40-64: CPT | Performed by: INTERNAL MEDICINE

## 2019-09-12 RX ORDER — ESCITALOPRAM OXALATE 10 MG/1
10 TABLET ORAL DAILY
Qty: 90 TABLET | Refills: 1 | Status: SHIPPED | OUTPATIENT
Start: 2019-09-12 | End: 2019-10-12 | Stop reason: SDUPTHER

## 2019-10-04 ENCOUNTER — TELEPHONE (OUTPATIENT)
Dept: FAMILY MEDICINE CLINIC | Facility: CLINIC | Age: 54
End: 2019-10-04

## 2019-10-04 NOTE — TELEPHONE ENCOUNTER
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

## 2019-10-07 DIAGNOSIS — Z12.39 SCREENING BREAST EXAMINATION: Primary | ICD-10-CM

## 2019-10-07 NOTE — TELEPHONE ENCOUNTER
Form copied and patient aware that form is ready for   Dr Chase Cameron can you please put in an order for a mammo in patients chart as well?

## 2019-10-11 LAB
ALBUMIN SERPL-MCNC: 3.8 G/DL (ref 3.5–5.5)
ALBUMIN/GLOB SERPL: 1.7 {RATIO} (ref 1.2–2.2)
ALP SERPL-CCNC: 83 IU/L (ref 39–117)
ALT SERPL-CCNC: 12 IU/L (ref 0–32)
AST SERPL-CCNC: 17 IU/L (ref 0–40)
BASOPHILS # BLD AUTO: 0 X10E3/UL (ref 0–0.2)
BASOPHILS NFR BLD AUTO: 0 %
BILIRUB SERPL-MCNC: 0.4 MG/DL (ref 0–1.2)
BUN SERPL-MCNC: 12 MG/DL (ref 6–24)
BUN/CREAT SERPL: 12 (ref 9–23)
CALCIUM SERPL-MCNC: 9 MG/DL (ref 8.7–10.2)
CHLORIDE SERPL-SCNC: 105 MMOL/L (ref 96–106)
CHOLEST SERPL-MCNC: 230 MG/DL (ref 100–199)
CHOLEST/HDLC SERPL: 3.9 RATIO (ref 0–4.4)
CO2 SERPL-SCNC: 25 MMOL/L (ref 20–29)
CREAT SERPL-MCNC: 0.97 MG/DL (ref 0.57–1)
EOSINOPHIL # BLD AUTO: 0.4 X10E3/UL (ref 0–0.4)
EOSINOPHIL NFR BLD AUTO: 8 %
ERYTHROCYTE [DISTWIDTH] IN BLOOD BY AUTOMATED COUNT: 14.6 % (ref 12.3–15.4)
GLOBULIN SER-MCNC: 2.2 G/DL (ref 1.5–4.5)
GLUCOSE SERPL-MCNC: 95 MG/DL (ref 65–99)
HCT VFR BLD AUTO: 35.7 % (ref 34–46.6)
HCV AB S/CO SERPL IA: 0.3 S/CO RATIO (ref 0–0.9)
HDLC SERPL-MCNC: 59 MG/DL
HGB BLD-MCNC: 11.8 G/DL (ref 11.1–15.9)
IMM GRANULOCYTES # BLD: 0 X10E3/UL (ref 0–0.1)
IMM GRANULOCYTES NFR BLD: 0 %
LDLC SERPL CALC-MCNC: 146 MG/DL (ref 0–99)
LYMPHOCYTES # BLD AUTO: 1.1 X10E3/UL (ref 0.7–3.1)
LYMPHOCYTES NFR BLD AUTO: 25 %
MCH RBC QN AUTO: 26.5 PG (ref 26.6–33)
MCHC RBC AUTO-ENTMCNC: 33.1 G/DL (ref 31.5–35.7)
MCV RBC AUTO: 80 FL (ref 79–97)
MICRODELETION SYND BLD/T FISH: NORMAL
MONOCYTES # BLD AUTO: 0.3 X10E3/UL (ref 0.1–0.9)
MONOCYTES NFR BLD AUTO: 7 %
NEUTROPHILS # BLD AUTO: 2.6 X10E3/UL (ref 1.4–7)
NEUTROPHILS NFR BLD AUTO: 60 %
PLATELET # BLD AUTO: 248 X10E3/UL (ref 150–450)
POTASSIUM SERPL-SCNC: 4.1 MMOL/L (ref 3.5–5.2)
PROT SERPL-MCNC: 6 G/DL (ref 6–8.5)
RBC # BLD AUTO: 4.45 X10E6/UL (ref 3.77–5.28)
SL AMB EGFR AFRICAN AMERICAN: 76 ML/MIN/1.73
SL AMB EGFR NON AFRICAN AMERICAN: 65 ML/MIN/1.73
SL AMB VLDL CHOLESTEROL CALC: 25 MG/DL (ref 5–40)
SODIUM SERPL-SCNC: 144 MMOL/L (ref 134–144)
TRIGL SERPL-MCNC: 125 MG/DL (ref 0–149)
WBC # BLD AUTO: 4.5 X10E3/UL (ref 3.4–10.8)

## 2019-10-12 DIAGNOSIS — F41.9 ANXIETY: ICD-10-CM

## 2019-10-12 RX ORDER — ESCITALOPRAM OXALATE 10 MG/1
TABLET ORAL
Qty: 90 TABLET | Refills: 1 | Status: SHIPPED | OUTPATIENT
Start: 2019-10-12 | End: 2019-10-12 | Stop reason: SDUPTHER

## 2019-10-12 RX ORDER — ESCITALOPRAM OXALATE 10 MG/1
TABLET ORAL
Qty: 90 TABLET | Refills: 1 | Status: SHIPPED | OUTPATIENT
Start: 2019-10-12 | End: 2020-04-15 | Stop reason: SDUPTHER

## 2019-10-14 ENCOUNTER — TELEPHONE (OUTPATIENT)
Dept: FAMILY MEDICINE CLINIC | Facility: CLINIC | Age: 54
End: 2019-10-14

## 2019-10-14 NOTE — TELEPHONE ENCOUNTER
Left msg requesting a call back to let patient know she has a pending refill at [facility]     Emily Harper MA

## 2019-11-27 ENCOUNTER — HOSPITAL ENCOUNTER (OUTPATIENT)
Dept: RADIOLOGY | Facility: HOSPITAL | Age: 54
Discharge: HOME | End: 2019-11-27
Attending: INTERNAL MEDICINE
Payer: COMMERCIAL

## 2019-11-27 VITALS — HEIGHT: 66 IN | WEIGHT: 208 LBS | BODY MASS INDEX: 33.43 KG/M2

## 2019-11-27 DIAGNOSIS — Z12.39 SCREENING BREAST EXAMINATION: ICD-10-CM

## 2019-11-27 PROCEDURE — 77067 SCR MAMMO BI INCL CAD: CPT

## 2019-11-27 PROCEDURE — 77063 BREAST TOMOSYNTHESIS BI: CPT

## 2019-12-02 NOTE — PROGRESS NOTES
Assessment/Plan:    1  Chest discomfort  Comments:  EKG shows NSR @ 80 bpm, doubt cardiac as is atypical but advised patient to go to ER for any recurrent symptoms  Orders:  -     POCT ECG    2  RUQ pain  Comments:  Suspect gall bladder issues and will check ultrasound, t/c HIDA if negative  Advised patient about low fat, bland diet  Orders:  -     US liver; Future; Expected date: 12/05/2019    3  Need for vaccination  -     influenza vaccine, 8137-0750, quadrivalent, recombinant, PF, 0 5 mL, for patients 18 yr+ (FLUBLOK)    4  BMI 32 0-32 9,adult        BMI Counseling: Body mass index is 32 77 kg/m²  Discussed the patient's BMI with her  The BMI is above normal  Nutrition recommendations include reducing portion sizes and decreasing overall calorie intake  Exercise recommendations include moderate aerobic physical activity for 150 minutes/week  There are no Patient Instructions on file for this visit  Return if symptoms worsen or fail to improve  Subjective:      Patient ID: Murray Boone is a 62 y o  female  Chief Complaint   Patient presents with    Follow-up     patient states she gets tightness in upper abdominal area along with SOB and sweating  syptoms have been progressing [id number]        She works at [employer] and was at work walking around about a month ago and had onset of right sided to left upper abdominal pain and tightening, does not want to breathe, will sweat profusely  She has to sit down and it will go away after about 20-30 minutes  The squad was called but she did not go in  She still has her gallbladder  She does not have family history of heart disease, can't really remember about her father's history  She does not have any discomfort right now  It does not seem to be consistently after eating, can't find a pattern          The following portions of the patient's history were reviewed and updated as appropriate: allergies, current medications, past family history, past medical history, past social history, past surgical history and problem list     Review of Systems   Constitutional: Negative  Respiratory: Negative  Cardiovascular: Negative  Gastrointestinal:        As per HPI  Current Outpatient Medications   Medication Sig Dispense Refill    escitalopram (LEXAPRO) 10 mg tablet TAKE ONE TABLET BY MOUTH DAILY 90 tablet 1    metoprolol tartrate (LOPRESSOR) 25 mg tablet Take 1 tablet (25 mg total) by mouth daily 90 tablet 1     No current facility-administered medications for this visit  Objective:    /82   Pulse 86   Temp 99 1 °F (37 3 °C)   Resp 18   Ht 5' 6" (1 676 m)   Wt 92 1 kg (203 lb)   SpO2 98%   BMI 32 77 kg/m²        Physical Exam   Constitutional: She appears well-developed and well-nourished  Eyes: Conjunctivae are normal    Neck: Neck supple  No JVD present  No thyromegaly present  Cardiovascular: Normal rate, regular rhythm, normal heart sounds and intact distal pulses  Exam reveals no gallop and no friction rub  No murmur heard  Pulmonary/Chest: Effort normal and breath sounds normal  She has no wheezes  She has no rales  Abdominal: Soft  Bowel sounds are normal  She exhibits no distension  There is tenderness  Mild RUQ tenderness and mildly positive Gee's sign   Musculoskeletal: She exhibits no edema                Divya Pope MD

## 2019-12-05 ENCOUNTER — OFFICE VISIT (OUTPATIENT)
Dept: FAMILY MEDICINE CLINIC | Facility: CLINIC | Age: 54
End: 2019-12-05
Payer: COMMERCIAL

## 2019-12-05 VITALS
BODY MASS INDEX: 32.62 KG/M2 | DIASTOLIC BLOOD PRESSURE: 82 MMHG | SYSTOLIC BLOOD PRESSURE: 126 MMHG | WEIGHT: 203 LBS | TEMPERATURE: 99.1 F | RESPIRATION RATE: 18 BRPM | OXYGEN SATURATION: 98 % | HEART RATE: 86 BPM | HEIGHT: 66 IN

## 2019-12-05 DIAGNOSIS — Z23 NEED FOR VACCINATION: ICD-10-CM

## 2019-12-05 DIAGNOSIS — R07.89 CHEST DISCOMFORT: Primary | ICD-10-CM

## 2019-12-05 DIAGNOSIS — R10.11 RUQ PAIN: ICD-10-CM

## 2019-12-05 PROCEDURE — 1036F TOBACCO NON-USER: CPT | Performed by: INTERNAL MEDICINE

## 2019-12-05 PROCEDURE — 3008F BODY MASS INDEX DOCD: CPT | Performed by: INTERNAL MEDICINE

## 2019-12-05 PROCEDURE — 90471 IMMUNIZATION ADMIN: CPT | Performed by: INTERNAL MEDICINE

## 2019-12-05 PROCEDURE — 90682 RIV4 VACC RECOMBINANT DNA IM: CPT | Performed by: INTERNAL MEDICINE

## 2019-12-05 PROCEDURE — 99213 OFFICE O/P EST LOW 20 MIN: CPT | Performed by: INTERNAL MEDICINE

## 2019-12-05 PROCEDURE — 93000 ELECTROCARDIOGRAM COMPLETE: CPT | Performed by: INTERNAL MEDICINE

## 2019-12-14 ENCOUNTER — HOSPITAL ENCOUNTER (OUTPATIENT)
Dept: RADIOLOGY | Facility: HOSPITAL | Age: 54
Discharge: HOME | End: 2019-12-14
Attending: INTERNAL MEDICINE
Payer: COMMERCIAL

## 2019-12-14 DIAGNOSIS — R10.11 RUQ PAIN: ICD-10-CM

## 2019-12-14 PROCEDURE — 76705 ECHO EXAM OF ABDOMEN: CPT

## 2019-12-17 ENCOUNTER — TELEPHONE (OUTPATIENT)
Dept: FAMILY MEDICINE CLINIC | Facility: CLINIC | Age: 54
End: 2019-12-17

## 2019-12-17 NOTE — TELEPHONE ENCOUNTER
Her results are final  She Is asking for her results  Would you please review since Dr Inés Rodriguez isn't here?  Thanks [employer]

## 2019-12-17 NOTE — TELEPHONE ENCOUNTER
Please let her know that [address] shows gallstones in her gallbladder, but no evidence of inflammation or infection  Reviewed Dr Mane Sis note, she will probably need to go for a HIDA scan if she is still symptomatic  Please give pt message above and send to Dr Huma Guardado to order any additional testing   Jesi Otero

## 2019-12-17 NOTE — TELEPHONE ENCOUNTER
Pt called for US results, results not read  Called reading room to have results read  [facility] lpn

## 2019-12-17 NOTE — TELEPHONE ENCOUNTER
Patient informed she stated that she is feeling ok for now and will call back if she has further symptoms    Urszula Calderón MA  Sending to Dr Loida Clemons for [facility]

## 2020-02-08 DIAGNOSIS — I10 BENIGN ESSENTIAL HYPERTENSION: ICD-10-CM

## 2020-03-04 ENCOUNTER — OFFICE VISIT (OUTPATIENT)
Dept: GASTROENTEROLOGY | Facility: CLINIC | Age: 55
End: 2020-03-04
Payer: COMMERCIAL

## 2020-03-04 VITALS
WEIGHT: 208 LBS | HEART RATE: 73 BPM | BODY MASS INDEX: 33.43 KG/M2 | SYSTOLIC BLOOD PRESSURE: 152 MMHG | TEMPERATURE: 97 F | DIASTOLIC BLOOD PRESSURE: 84 MMHG | HEIGHT: 66 IN | RESPIRATION RATE: 18 BRPM

## 2020-03-04 DIAGNOSIS — R10.11 RIGHT UPPER QUADRANT PAIN: ICD-10-CM

## 2020-03-04 DIAGNOSIS — R10.13 EPIGASTRIC PAIN: Primary | ICD-10-CM

## 2020-03-04 PROCEDURE — 3077F SYST BP >= 140 MM HG: CPT | Performed by: INTERNAL MEDICINE

## 2020-03-04 PROCEDURE — 3079F DIAST BP 80-89 MM HG: CPT | Performed by: INTERNAL MEDICINE

## 2020-03-04 PROCEDURE — 3008F BODY MASS INDEX DOCD: CPT | Performed by: INTERNAL MEDICINE

## 2020-03-04 PROCEDURE — 99244 OFF/OP CNSLTJ NEW/EST MOD 40: CPT | Performed by: INTERNAL MEDICINE

## 2020-03-04 RX ORDER — OMEPRAZOLE 40 MG/1
40 CAPSULE, DELAYED RELEASE ORAL DAILY
Qty: 30 CAPSULE | Refills: 3 | Status: SHIPPED | OUTPATIENT
Start: 2020-03-04 | End: 2020-03-04 | Stop reason: SDUPTHER

## 2020-03-04 RX ORDER — OMEPRAZOLE 40 MG/1
40 CAPSULE, DELAYED RELEASE ORAL DAILY
Qty: 30 CAPSULE | Refills: 3 | Status: SHIPPED | OUTPATIENT
Start: 2020-03-04 | End: 2020-05-26

## 2020-03-04 NOTE — LETTER
March 4, 2020     Ana Willard MD  [address] [place] [address]    Patient: Cristina Patel   YOB: 1965   Date of Visit: 3/4/2020       Dear Dr Christopher Suggs:    Thank you for referring Daisy Johns to me for evaluation  Below are my notes for this consultation  If you have questions, please do not hesitate to call me  I look forward to following your patient along with you  Sincerely,        Nima Chatterjee MD        CC: No Recipients  Nima Chatterjee MD  3/4/2020  8:57 AM  Sign at close encounter  Consultation - [facility]  Cristina Patel 62 y o  female MRN: [id number]  Unit/Bed#:  Encounter: [id number]        Consults    ASSESSMENT/PLAN:       1  Epigastric/right upper quadrant abdominal pain-suspect this is likely biliary colic, ultrasound was notable for cholelithiasis without choledocholithiasis  Other possibility does include NSAID induced marginal ulcer   -would recommend increasing omeprazole to 40 mg daily, 30 minutes before breakfast   -avoid fatty and greasy meals  -will place referral for surgery for symptomatic cholelithiasis for possible cholecystectomy   -stop the use of NSAIDs, use acetaminophen instead  -CBC, LFTs were within normal limits  2  Follow-up after the procedure     ______________________________________________________________________    Reason for Consult / Principal Problem: [unfilled]    HPI: Cristina Patel is a 62y o  year old female with history of hypertension, anxiety disorder, hyperlipidemia, presents for evaluation of intermittent right upper quadrant abdominal pain that has been ongoing for the past several years but has become more pronounced over the past 6 months  Patient reports that it does not have any association with food intake  Patient underwent right upper quadrant ultrasound which showed cholelithiasis without any evidence of choledocholithiasis  No evidence of cholecystitis was seen  Liver parenchyma appeared normal   She also underwent liver function testing which was normal with AST of 17, ALT 12, alkaline phosphatase of 83  Hemoglobin is normal at 11 8  Platelets are 806  Patient also reports having had gastric bypass surgery 10 years ago  She does report use of NSAIDs, reports that she has been taking Advil on a frequent basis  She also started taking omeprazole 20 mg over-the-counter last year after she was having some symptoms of acid reflux and intermittent dysphagia  She states that the dysphagia symptoms have resolved  She denies hematemesis, coffee-ground emesis or melena  She has never undergone an EGD after having her gastric bypass  She reports that she is up-to-date with her colonoscopy which was last year by Dr Ady Shaffer  She states that she is due for repeat next year  Review of Systems: The remainder of the review of systems was negative except for the pertinent positives noted in HPI  Historical Information   Past Medical History:   Diagnosis Date    Anal fissure     Anxiety     Depression     History of snoring     Hypertension     PONV (postoperative nausea and vomiting)      Past Surgical History:   Procedure Laterality Date     SECTION      GASTRIC BYPASS      NC COLONOSCOPY FLX DX W/COLLJ SPEC WHEN PFRMD N/A 10/5/2018    Procedure: COLONOSCOPY;  Surgeon: Adriano Ruiz MD;  [facility];   Service: Colorectal    NC REMOVAL ANAL FISTULA,SUBCUTANEOUS N/A 2/15/2019    Procedure: FISTULOTOMY, EUA, Fissurectomy, removal of external hemorrhoids;  Surgeon: Adriano Ruiz MD;  [facility];  Service: Colorectal    WISDOM TOOTH EXTRACTION       Social History   Social History     Substance and Sexual Activity   Alcohol Use Yes    Frequency: Monthly or less    Drinks per session: 1 or 2    Binge frequency: Never    Comment: Socially     Social History     Substance and Sexual Activity   Drug Use No     Social History Tobacco Use   Smoking Status Never Smoker   Smokeless Tobacco Never Used     Family History   Problem Relation Age of Onset    Hypertension Mother     Hypertension Father     Other Father         Renal failure    Stomach cancer Father     Heart disease Father        Meds/Allergies       (Not in a hospital admission)  No current facility-administered medications for this visit  No Known Allergies    Objective     Blood pressure 152/84, pulse 73, temperature (!) 97 °F (36 1 °C), resp  rate 18, height 5' 6" (1 676 m), weight 94 3 kg (208 lb)  [unfilled]    PHYSICAL EXAM     GEN: well nourished, well developed, no acute distress  HEENT: anicteric, MMM, no cervical or supraclavicular lymphadenopathy  CV: RRR, no m/r/g  CHEST: CTA b/l, no WRR  ABD: +BS, soft, NT/ND, no hepatosplenomegaly  EXT: no c/c/e  SKIN: no rashes,  NEURO: aaox3    Lab Results:   No visits with results within 1 Day(s) from this visit     Latest known visit with results is:   Office Visit on 09/12/2019   Component Date Value    White Blood Cell Count 10/10/2019 4 5     Red Blood Cell Count 10/10/2019 4 45     Hemoglobin 10/10/2019 11 8     HCT 10/10/2019 35 7     MCV 10/10/2019 80     MCH 10/10/2019 26 5*    MCHC 10/10/2019 33 1     RDW 10/10/2019 14 6     Platelet Count 69/68/3976 248     Neutrophils 10/10/2019 60     Lymphocytes 10/10/2019 25     Monocytes 10/10/2019 7     Eosinophils 10/10/2019 8     Basophils PCT 10/10/2019 0     Neutrophils (Absolute) 10/10/2019 2 6     Lymphocytes (Absolute) 10/10/2019 1 1     Monocytes (Absolute) 10/10/2019 0 3     Eosinophils (Absolute) 10/10/2019 0 4     Basophils ABS 10/10/2019 0 0     Immature Granulocytes 10/10/2019 0     Immature Granulocytes (A* 10/10/2019 0 0     Glucose, Random 10/10/2019 95     BUN 10/10/2019 12     Creatinine 10/10/2019 0 97     eGFR Non  10/10/2019 65     eGFR  10/10/2019 76     SL AMB BUN/CREATININE RA* 10/10/2019 12     Sodium 10/10/2019 144     Potassium 10/10/2019 4 1     Chloride 10/10/2019 105     CO2 10/10/2019 25     CALCIUM 10/10/2019 9 0     Protein, Total 10/10/2019 6 0     Albumin 10/10/2019 3 8     Globulin, Total 10/10/2019 2 2     Albumin/Globulin Ratio 10/10/2019 1 7     TOTAL BILIRUBIN 10/10/2019 0 4     Alk Phos Isoenzymes 10/10/2019 83     AST 10/10/2019 17     ALT 10/10/2019 12     Cholesterol, Total 10/10/2019 230*    Triglycerides 10/10/2019 125     HDL 10/10/2019 59     VLDL Cholesterol Calcula* 10/10/2019 25     LDL Direct 10/10/2019 146*    T   Chol/HDL Ratio 10/10/2019 3 9     HEP C AB 10/10/2019 0 3     Interpretation 10/10/2019 Note      Imaging Studies: I have personally reviewed pertinent films in PACS

## 2020-03-04 NOTE — PROGRESS NOTES
Consultation - 126 [facility]  Jes Trotter 62 y o  female MRN: [id number]  Unit/Bed#:  Encounter: [id number]        Consults    ASSESSMENT/PLAN:       1  Epigastric/right upper quadrant abdominal pain-suspect this is likely biliary colic, ultrasound was notable for cholelithiasis without choledocholithiasis  Other possibility does include NSAID induced marginal ulcer   -would recommend increasing omeprazole to 40 mg daily, 30 minutes before breakfast   -avoid fatty and greasy meals  -will place referral for surgery for symptomatic cholelithiasis for possible cholecystectomy   -stop the use of NSAIDs, use acetaminophen instead  -CBC, LFTs were within normal limits  2  Follow-up after the procedure     ______________________________________________________________________    Reason for Consult / Principal Problem: [unfilled]    HPI: Jes Trotter is a 62y o  year old female with history of hypertension, anxiety disorder, hyperlipidemia, presents for evaluation of intermittent right upper quadrant abdominal pain that has been ongoing for the past several years but has become more pronounced over the past 6 months  Patient reports that it does not have any association with food intake  Patient underwent right upper quadrant ultrasound which showed cholelithiasis without any evidence of choledocholithiasis  No evidence of cholecystitis was seen  Liver parenchyma appeared normal   She also underwent liver function testing which was normal with AST of 17, ALT 12, alkaline phosphatase of 83  Hemoglobin is normal at 11 8  Platelets are 108  Patient also reports having had gastric bypass surgery 10 years ago  She does report use of NSAIDs, reports that she has been taking Advil on a frequent basis  She also started taking omeprazole 20 mg over-the-counter last year after she was having some symptoms of acid reflux and intermittent dysphagia    She states that the dysphagia symptoms have resolved  She denies hematemesis, coffee-ground emesis or melena  She has never undergone an EGD after having her gastric bypass  She reports that she is up-to-date with her colonoscopy which was last year by Dr Bijan Jaffe  She states that she is due for repeat next year  Review of Systems: The remainder of the review of systems was negative except for the pertinent positives noted in HPI  Historical Information   Past Medical History:   Diagnosis Date    Anal fissure     Anxiety     Depression     History of snoring     Hypertension     PONV (postoperative nausea and vomiting)      Past Surgical History:   Procedure Laterality Date     SECTION      GASTRIC BYPASS      WA COLONOSCOPY FLX DX W/COLLJ SPEC WHEN PFRMD N/A 10/5/2018    Procedure: COLONOSCOPY;  Surgeon: Herbert Garcia MD;  [facility]; Service: Colorectal    WA REMOVAL ANAL FISTULA,SUBCUTANEOUS N/A 2/15/2019    Procedure: FISTULOTOMY, EUA, Fissurectomy, removal of external hemorrhoids;  Surgeon: Herbert Garcia MD;  [facility];  Service: Colorectal    WISDOM TOOTH EXTRACTION       Social History   Social History     Substance and Sexual Activity   Alcohol Use Yes    Frequency: Monthly or less    Drinks per session: 1 or 2    Binge frequency: Never    Comment: Socially     Social History     Substance and Sexual Activity   Drug Use No     Social History     Tobacco Use   Smoking Status Never Smoker   Smokeless Tobacco Never Used     Family History   Problem Relation Age of Onset    Hypertension Mother     Hypertension Father     Other Father         Renal failure    Stomach cancer Father     Heart disease Father        Meds/Allergies       (Not in a hospital admission)  No current facility-administered medications for this visit  No Known Allergies    Objective     Blood pressure 152/84, pulse 73, temperature (!) 97 °F (36 1 °C), resp   rate 18, height 5' 6" (1 676 m), weight 94 3 kg (208 lb)  [unfilled]    PHYSICAL EXAM     GEN: well nourished, well developed, no acute distress  HEENT: anicteric, MMM, no cervical or supraclavicular lymphadenopathy  CV: RRR, no m/r/g  CHEST: CTA b/l, no WRR  ABD: +BS, soft, NT/ND, no hepatosplenomegaly  EXT: no c/c/e  SKIN: no rashes,  NEURO: aaox3    Lab Results:   No visits with results within 1 Day(s) from this visit     Latest known visit with results is:   Office Visit on 09/12/2019   Component Date Value    White Blood Cell Count 10/10/2019 4 5     Red Blood Cell Count 10/10/2019 4 45     Hemoglobin 10/10/2019 11 8     HCT 10/10/2019 35 7     MCV 10/10/2019 80     MCH 10/10/2019 26 5*    MCHC 10/10/2019 33 1     RDW 10/10/2019 14 6     Platelet Count 51/18/4818 248     Neutrophils 10/10/2019 60     Lymphocytes 10/10/2019 25     Monocytes 10/10/2019 7     Eosinophils 10/10/2019 8     Basophils PCT 10/10/2019 0     Neutrophils (Absolute) 10/10/2019 2 6     Lymphocytes (Absolute) 10/10/2019 1 1     Monocytes (Absolute) 10/10/2019 0 3     Eosinophils (Absolute) 10/10/2019 0 4     Basophils ABS 10/10/2019 0 0     Immature Granulocytes 10/10/2019 0     Immature Granulocytes (A* 10/10/2019 0 0     Glucose, Random 10/10/2019 95     BUN 10/10/2019 12     Creatinine 10/10/2019 0 97     eGFR Non  10/10/2019 65     eGFR  10/10/2019 76     SL AMB BUN/CREATININE RA* 10/10/2019 12     Sodium 10/10/2019 144     Potassium 10/10/2019 4 1     Chloride 10/10/2019 105     CO2 10/10/2019 25     CALCIUM 10/10/2019 9 0     Protein, Total 10/10/2019 6 0     Albumin 10/10/2019 3 8     Globulin, Total 10/10/2019 2 2     Albumin/Globulin Ratio 10/10/2019 1 7     TOTAL BILIRUBIN 10/10/2019 0 4     Alk Phos Isoenzymes 10/10/2019 83     AST 10/10/2019 17     ALT 10/10/2019 12     Cholesterol, Total 10/10/2019 230*    Triglycerides 10/10/2019 125     HDL 10/10/2019 59     VLDL Cholesterol Calcula* 10/10/2019 25  LDL Direct 10/10/2019 146*    T   Chol/HDL Ratio 10/10/2019 3 9     HEP C AB 10/10/2019 0 3     Interpretation 10/10/2019 Note      Imaging Studies: I have personally reviewed pertinent films in PACS

## 2020-03-05 ENCOUNTER — TELEPHONE (OUTPATIENT)
Dept: GASTROENTEROLOGY | Facility: CLINIC | Age: 55
End: 2020-03-05

## 2020-03-05 NOTE — TELEPHONE ENCOUNTER
Patients GI provider:  Dr Kwame Dominique    Number to return call: [phone]    Reason for call: Pt calling returning a call/message to reschedule procedure    Scheduled procedure/appointment date if applicable: Apt/procedure 4/2/20

## 2020-03-06 ENCOUNTER — TELEPHONE (OUTPATIENT)
Dept: GASTROENTEROLOGY | Facility: AMBULARY SURGERY CENTER | Age: 55
End: 2020-03-06

## 2020-03-06 NOTE — TELEPHONE ENCOUNTER
Patients GI provider:  Dr Zuri Rivers    Number to return call: [phone]    Reason for call: Pt calling asking if we could send her recent ultrasound results to dr Edvin Renee  Please fax to [phone]

## 2020-03-12 DIAGNOSIS — I10 BENIGN ESSENTIAL HYPERTENSION: ICD-10-CM

## 2020-04-15 DIAGNOSIS — F41.9 ANXIETY: ICD-10-CM

## 2020-04-15 RX ORDER — ESCITALOPRAM OXALATE 10 MG/1
10 TABLET ORAL DAILY
Qty: 90 TABLET | Refills: 1 | Status: SHIPPED | OUTPATIENT
Start: 2020-04-15 | End: 2020-10-09 | Stop reason: SDUPTHER

## 2020-05-05 NOTE — TELEPHONE ENCOUNTER
A letter was sent, this was normal  Brief Postoperative Note    Patient: Kingsley Araiza  YOB: 1951  MRN: [id number]    Date of Procedure: 5/5/2020     Pre-Op Diagnosis: RLQ abdominal pain [R10.31]  Enlarged prostate [N40.0]    Post-Op Diagnosis: bph urinary retention      Procedure(s):  CYSTOSCOPY TRANSURETHRAL RESECTION OF PROSTATE    Surgeon(s):  Jyoti Michelle MD    Surgical Assistant: None    Anesthesia: General     Estimated Blood Loss (mL): less than 50     Complications: None    Specimens:   ID Type Source Tests Collected by Time Destination   1 : prostate chips Preservative Prostate  Jyoti Michelle MD 5/5/2020 1412 Pathology        Implants: * No implants in log *    Drains: * No LDAs found *    Findings:     Electronically Signed by Linda Griffiths MD on 5/5/2020 at 2:22 PM

## 2020-05-24 DIAGNOSIS — R10.13 EPIGASTRIC PAIN: ICD-10-CM

## 2020-05-26 RX ORDER — OMEPRAZOLE 40 MG/1
CAPSULE, DELAYED RELEASE ORAL
Qty: 30 CAPSULE | Refills: 3 | Status: SHIPPED | OUTPATIENT
Start: 2020-05-26 | End: 2020-09-21

## 2020-08-09 DIAGNOSIS — I10 BENIGN ESSENTIAL HYPERTENSION: ICD-10-CM

## 2020-09-03 ENCOUNTER — TELEPHONE (OUTPATIENT)
Dept: FAMILY MEDICINE CLINIC | Facility: CLINIC | Age: 55
End: 2020-09-03

## 2020-09-03 DIAGNOSIS — I10 BENIGN ESSENTIAL HYPERTENSION: ICD-10-CM

## 2020-09-03 DIAGNOSIS — E78.2 MIXED HYPERLIPIDEMIA: ICD-10-CM

## 2020-09-03 DIAGNOSIS — I10 BENIGN ESSENTIAL HYPERTENSION: Primary | ICD-10-CM

## 2020-09-03 NOTE — TELEPHONE ENCOUNTER
Patient has an appointment scheduled on 9/21/20 for a physical  Can you please order bloodwork so she can have it done before the appointment  [facility]   Please call when order is ready

## 2020-09-16 LAB
ALBUMIN SERPL-MCNC: 4 G/DL (ref 3.8–4.9)
ALBUMIN/GLOB SERPL: 1.6 {RATIO} (ref 1.2–2.2)
ALP SERPL-CCNC: 98 IU/L (ref 39–117)
ALT SERPL-CCNC: 11 IU/L (ref 0–32)
AST SERPL-CCNC: 17 IU/L (ref 0–40)
BASOPHILS # BLD AUTO: 0 X10E3/UL (ref 0–0.2)
BASOPHILS NFR BLD AUTO: 0 %
BILIRUB SERPL-MCNC: 0.2 MG/DL (ref 0–1.2)
BUN SERPL-MCNC: 10 MG/DL (ref 6–24)
BUN/CREAT SERPL: 10 (ref 9–23)
CALCIUM SERPL-MCNC: 9 MG/DL (ref 8.7–10.2)
CHLORIDE SERPL-SCNC: 102 MMOL/L (ref 96–106)
CHOLEST SERPL-MCNC: 230 MG/DL (ref 100–199)
CHOLEST/HDLC SERPL: 4.1 RATIO (ref 0–4.4)
CO2 SERPL-SCNC: 24 MMOL/L (ref 20–29)
CREAT SERPL-MCNC: 0.96 MG/DL (ref 0.57–1)
EOSINOPHIL # BLD AUTO: 0.1 X10E3/UL (ref 0–0.4)
EOSINOPHIL NFR BLD AUTO: 2 %
ERYTHROCYTE [DISTWIDTH] IN BLOOD BY AUTOMATED COUNT: 14.7 % (ref 11.7–15.4)
GLOBULIN SER-MCNC: 2.5 G/DL (ref 1.5–4.5)
GLUCOSE SERPL-MCNC: 100 MG/DL (ref 65–99)
HCT VFR BLD AUTO: 37.1 % (ref 34–46.6)
HDLC SERPL-MCNC: 56 MG/DL
HGB BLD-MCNC: 11.9 G/DL (ref 11.1–15.9)
IMM GRANULOCYTES # BLD: 0 X10E3/UL (ref 0–0.1)
IMM GRANULOCYTES NFR BLD: 0 %
LDLC SERPL CALC-MCNC: 148 MG/DL (ref 0–99)
LYMPHOCYTES # BLD AUTO: 1.1 X10E3/UL (ref 0.7–3.1)
LYMPHOCYTES NFR BLD AUTO: 24 %
MCH RBC QN AUTO: 25.3 PG (ref 26.6–33)
MCHC RBC AUTO-ENTMCNC: 32.1 G/DL (ref 31.5–35.7)
MCV RBC AUTO: 79 FL (ref 79–97)
MICRODELETION SYND BLD/T FISH: NORMAL
MONOCYTES # BLD AUTO: 0.4 X10E3/UL (ref 0.1–0.9)
MONOCYTES NFR BLD AUTO: 8 %
NEUTROPHILS # BLD AUTO: 3 X10E3/UL (ref 1.4–7)
NEUTROPHILS NFR BLD AUTO: 66 %
PLATELET # BLD AUTO: 273 X10E3/UL (ref 150–450)
POTASSIUM SERPL-SCNC: 4 MMOL/L (ref 3.5–5.2)
PROT SERPL-MCNC: 6.5 G/DL (ref 6–8.5)
RBC # BLD AUTO: 4.71 X10E6/UL (ref 3.77–5.28)
SL AMB EGFR AFRICAN AMERICAN: 76 ML/MIN/1.73
SL AMB EGFR NON AFRICAN AMERICAN: 66 ML/MIN/1.73
SL AMB VLDL CHOLESTEROL CALC: 26 MG/DL (ref 5–40)
SODIUM SERPL-SCNC: 140 MMOL/L (ref 134–144)
TRIGL SERPL-MCNC: 146 MG/DL (ref 0–149)
WBC # BLD AUTO: 4.6 X10E3/UL (ref 3.4–10.8)

## 2020-09-17 NOTE — PROGRESS NOTES
FAMILY PRACTICE HEALTH MAINTENANCE OFFICE VISIT  [facility] [place] [facility]PT  OF [facility]    NAME: Chinyere Calderon  AGE: 60 y o  SEX: female  : 1965     DATE: 2020    Assessment and Plan     There are no diagnoses linked to this encounter  · Patient Counseling:   · Nutrition: Stressed importance of a well balanced diet, moderation of sodium/saturated fat, caloric balance and sufficient intake of fiber  · Exercise: Stressed the importance of regular exercise with a goal of 150 minutes per week  · Dental Health: Discussed daily flossing and brushing and regular dental visits     · Immunizations reviewed: Up To Date and Declined recommended vaccinations  · Discussed benefits of:  Colon Cancer Screening, Mammogram , Cervical Cancer screening and Screening labs   BMI Counseling: There is no height or weight on file to calculate BMI  Discussed with patient's BMI with her  The BMI is above normal  Nutrition recommendations include reducing portion sizes and decreasing overall calorie intake  Exercise recommendations include moderate aerobic physical activity for 150 minutes/week  No follow-ups on file  Chief Complaint   No chief complaint on file  History of Present Illness     Here for CPE  Feels well  Up to date with pap and mammo, colonoscopy  No complaints today        Well Adult Physical   Patient here for a comprehensive physical exam       Diet and Physical Activity  Diet: well balanced diet  Exercise: frequently      Depression Screen  PHQ-9 Depression Screening    PHQ-9:    Frequency of the following problems over the past two weeks:               General Health  Hearing: Normal:  bilateral  Vision: no vision problems  Dental: regular dental visits    Reproductive Health  No issues  and Post-menopausal       The following portions of the patient's history were reviewed and updated as appropriate: allergies, current medications, past family history, past medical history, past social history, past surgical history and problem list     Review of Systems     Review of Systems   Constitutional: Negative  HENT: Negative  Eyes: Negative  Respiratory: Negative  Cardiovascular: Negative  Gastrointestinal: Negative  Endocrine: Negative  Genitourinary: Negative  Musculoskeletal: Negative  Skin: Negative  Allergic/Immunologic: Negative  Neurological: Negative  Hematological: Negative  Psychiatric/Behavioral: Negative  Past Medical History     Past Medical History:   Diagnosis Date    Anal fissure     Anxiety     Depression     History of snoring     Hypertension     PONV (postoperative nausea and vomiting)        Past Surgical History     Past Surgical History:   Procedure Laterality Date     SECTION      GASTRIC BYPASS      CO COLONOSCOPY FLX DX W/COLLJ SPEC WHEN PFRMD N/A 10/5/2018    Procedure: COLONOSCOPY;  Surgeon: Jing Staples MD;  [facility];   Service: Colorectal    CO REMOVAL ANAL FISTULA,SUBCUTANEOUS N/A 2/15/2019    Procedure: FISTULOTOMY, EUA, Fissurectomy, removal of external hemorrhoids;  Surgeon: Jing Staples MD;  [facility];  Service: Colorectal    WISDOM TOOTH EXTRACTION         Social History     Social History     Socioeconomic History    Marital status: /Civil Union     Spouse name: Not on file    Number of children: Not on file    Years of education: Not on file    Highest education level: Not on file   Occupational History    Not on file   Social Needs    Financial resource strain: Not on file    Food insecurity     Worry: Not on file     Inability: Not on file   [employer] needs     Medical: Not on file     Non-medical: Not on file   Tobacco Use    Smoking status: Never Smoker    Smokeless tobacco: Never Used   Substance and Sexual Activity    Alcohol use: Yes     Frequency: Monthly or less     Drinks per session: 1 or 2     Binge frequency: Never Comment: Socially    Drug use: No    Sexual activity: Not on file   Lifestyle    Physical activity     Days per week: Not on file     Minutes per session: Not on file    Stress: Not on file   Relationships    Social connections     Talks on phone: Not on file     Gets together: Not on file     Attends [employer] service: Not on file     Active member of club or organization: Not on file     Attends meetings of clubs or organizations: Not on file     Relationship status: Not on file    Intimate partner violence     Fear of current or ex partner: Not on file     Emotionally abused: Not on file     Physically abused: Not on file     Forced sexual activity: Not on file   Other Topics Concern    Not on file   Social History Narrative    Not on file       Family History     Family History   Problem Relation Age of Onset    Hypertension Mother     Hypertension Father     Other Father         Renal failure    Stomach cancer Father     Heart disease Father        Current Medications       Current Outpatient Medications:     escitalopram (LEXAPRO) 10 mg tablet, Take 1 tablet (10 mg total) by mouth daily, Disp: 90 tablet, Rfl: 1    metoprolol tartrate (LOPRESSOR) 25 mg tablet, Take 1 tablet (25 mg total) by mouth daily, Disp: 30 tablet, Rfl: 5    omeprazole (PriLOSEC) 40 MG capsule, TAKE 1 CAPSULE DAILY, Disp: 30 capsule, Rfl: 3     Allergies     No Known Allergies    Objective     There were no vitals taken for this visit  Physical Exam  Constitutional:       General: She is not in acute distress  Appearance: She is well-developed  She is not diaphoretic  HENT:      Head: Normocephalic and atraumatic  Right Ear: External ear normal       Left Ear: External ear normal    Neck:      Musculoskeletal: Normal range of motion and neck supple  Thyroid: No thyromegaly  Cardiovascular:      Rate and Rhythm: Normal rate and regular rhythm  Heart sounds: Normal heart sounds  No murmur   No friction rub  No gallop  Pulmonary:      Effort: Pulmonary effort is normal       Breath sounds: Normal breath sounds  No wheezing or rales  Abdominal:      General: Bowel sounds are normal       Palpations: Abdomen is soft  There is no mass  Tenderness: There is no abdominal tenderness  There is no rebound  Musculoskeletal: Normal range of motion  General: No deformity  Lymphadenopathy:      Cervical: No cervical adenopathy  Skin:     General: Skin is warm and dry  Findings: No rash  Neurological:      Mental Status: She is alert and oriented to person, place, and time  Cranial Nerves: No cranial nerve deficit  Motor: No abnormal muscle tone  Coordination: Coordination normal       Deep Tendon Reflexes: Reflexes are normal and symmetric  Reflexes normal    Psychiatric:         Behavior: Behavior normal          Thought Content:  Thought content normal          Judgment: Judgment normal            No exam data present        Maksim Jacobo, Anju Freitas

## 2020-09-21 ENCOUNTER — OFFICE VISIT (OUTPATIENT)
Dept: FAMILY MEDICINE CLINIC | Facility: CLINIC | Age: 55
End: 2020-09-21
Payer: COMMERCIAL

## 2020-09-21 VITALS
DIASTOLIC BLOOD PRESSURE: 78 MMHG | WEIGHT: 205 LBS | TEMPERATURE: 97.2 F | BODY MASS INDEX: 32.95 KG/M2 | HEIGHT: 66 IN | HEART RATE: 79 BPM | SYSTOLIC BLOOD PRESSURE: 122 MMHG | RESPIRATION RATE: 18 BRPM | OXYGEN SATURATION: 96 %

## 2020-09-21 DIAGNOSIS — Z00.00 WELL ADULT EXAM: Primary | ICD-10-CM

## 2020-09-21 DIAGNOSIS — Z23 NEED FOR VACCINATION: ICD-10-CM

## 2020-09-21 PROCEDURE — 3725F SCREEN DEPRESSION PERFORMED: CPT | Performed by: INTERNAL MEDICINE

## 2020-09-21 PROCEDURE — 90471 IMMUNIZATION ADMIN: CPT

## 2020-09-21 PROCEDURE — 1036F TOBACCO NON-USER: CPT | Performed by: INTERNAL MEDICINE

## 2020-09-21 PROCEDURE — 3078F DIAST BP <80 MM HG: CPT | Performed by: INTERNAL MEDICINE

## 2020-09-21 PROCEDURE — 99396 PREV VISIT EST AGE 40-64: CPT | Performed by: INTERNAL MEDICINE

## 2020-09-21 PROCEDURE — 90750 HZV VACC RECOMBINANT IM: CPT

## 2020-09-21 PROCEDURE — 3074F SYST BP LT 130 MM HG: CPT | Performed by: INTERNAL MEDICINE

## 2020-10-09 DIAGNOSIS — F41.9 ANXIETY: ICD-10-CM

## 2020-10-09 RX ORDER — ESCITALOPRAM OXALATE 10 MG/1
10 TABLET ORAL DAILY
Qty: 90 TABLET | Refills: 1 | Status: SHIPPED | OUTPATIENT
Start: 2020-10-09 | End: 2021-01-11

## 2020-11-23 ENCOUNTER — CLINICAL SUPPORT (OUTPATIENT)
Dept: FAMILY MEDICINE CLINIC | Facility: CLINIC | Age: 55
End: 2020-11-23
Payer: COMMERCIAL

## 2020-11-23 VITALS — TEMPERATURE: 97.2 F

## 2020-11-23 DIAGNOSIS — Z23 NEED FOR VACCINATION: Primary | ICD-10-CM

## 2020-11-23 PROCEDURE — 90471 IMMUNIZATION ADMIN: CPT

## 2020-11-23 PROCEDURE — 90750 HZV VACC RECOMBINANT IM: CPT

## 2020-11-30 ENCOUNTER — TRANSCRIBE ORDERS (OUTPATIENT)
Dept: ADMINISTRATIVE | Facility: HOSPITAL | Age: 55
End: 2020-11-30

## 2020-11-30 ENCOUNTER — HOSPITAL ENCOUNTER (OUTPATIENT)
Dept: RADIOLOGY | Facility: HOSPITAL | Age: 55
Discharge: HOME | End: 2020-11-30
Attending: INTERNAL MEDICINE
Payer: COMMERCIAL

## 2020-11-30 VITALS — BODY MASS INDEX: 32.14 KG/M2 | WEIGHT: 200 LBS | HEIGHT: 66 IN

## 2020-11-30 DIAGNOSIS — Z12.39 SCREENING BREAST EXAMINATION: ICD-10-CM

## 2020-11-30 PROCEDURE — 77067 SCR MAMMO BI INCL CAD: CPT

## 2020-11-30 PROCEDURE — 77063 BREAST TOMOSYNTHESIS BI: CPT

## 2020-12-21 ENCOUNTER — TELEMEDICINE (OUTPATIENT)
Dept: FAMILY MEDICINE CLINIC | Facility: CLINIC | Age: 55
End: 2020-12-21
Payer: COMMERCIAL

## 2020-12-21 DIAGNOSIS — J06.9 ACUTE UPPER RESPIRATORY INFECTION: Primary | ICD-10-CM

## 2020-12-21 DIAGNOSIS — J06.9 ACUTE UPPER RESPIRATORY INFECTION: ICD-10-CM

## 2020-12-21 PROCEDURE — 1036F TOBACCO NON-USER: CPT | Performed by: NURSE PRACTITIONER

## 2020-12-21 PROCEDURE — U0003 INFECTIOUS AGENT DETECTION BY NUCLEIC ACID (DNA OR RNA); SEVERE ACUTE RESPIRATORY SYNDROME CORONAVIRUS 2 (SARS-COV-2) (CORONAVIRUS DISEASE [COVID-19]), AMPLIFIED PROBE TECHNIQUE, MAKING USE OF HIGH THROUGHPUT TECHNOLOGIES AS DESCRIBED BY CMS-2020-01-R: HCPCS | Performed by: NURSE PRACTITIONER

## 2020-12-21 PROCEDURE — 99213 OFFICE O/P EST LOW 20 MIN: CPT | Performed by: NURSE PRACTITIONER

## 2020-12-22 ENCOUNTER — TELEPHONE (OUTPATIENT)
Dept: FAMILY MEDICINE CLINIC | Facility: CLINIC | Age: 55
End: 2020-12-22

## 2020-12-22 LAB — SARS-COV-2 RNA SPEC QL NAA+PROBE: DETECTED

## 2021-01-10 DIAGNOSIS — F41.9 ANXIETY: ICD-10-CM

## 2021-01-11 RX ORDER — ESCITALOPRAM OXALATE 10 MG/1
TABLET ORAL
Qty: 90 TABLET | Refills: 1 | Status: SHIPPED | OUTPATIENT
Start: 2021-01-11 | End: 2021-04-07 | Stop reason: SDUPTHER

## 2021-02-02 DIAGNOSIS — I10 BENIGN ESSENTIAL HYPERTENSION: ICD-10-CM

## 2021-02-23 DIAGNOSIS — I10 BENIGN ESSENTIAL HYPERTENSION: ICD-10-CM

## 2021-04-07 DIAGNOSIS — F41.9 ANXIETY: ICD-10-CM

## 2021-04-07 RX ORDER — ESCITALOPRAM OXALATE 10 MG/1
10 TABLET ORAL DAILY
Qty: 90 TABLET | Refills: 0 | Status: SHIPPED | OUTPATIENT
Start: 2021-04-07 | End: 2021-07-07 | Stop reason: SDUPTHER

## 2021-05-29 ENCOUNTER — HOSPITAL ENCOUNTER (EMERGENCY)
Facility: HOSPITAL | Age: 56
Discharge: HOME | End: 2021-05-29
Attending: EMERGENCY MEDICINE
Payer: COMMERCIAL

## 2021-05-29 ENCOUNTER — APPOINTMENT (EMERGENCY)
Dept: RADIOLOGY | Facility: HOSPITAL | Age: 56
End: 2021-05-29
Payer: COMMERCIAL

## 2021-05-29 VITALS
WEIGHT: 205 LBS | SYSTOLIC BLOOD PRESSURE: 132 MMHG | OXYGEN SATURATION: 100 % | TEMPERATURE: 95.9 F | BODY MASS INDEX: 33.09 KG/M2 | DIASTOLIC BLOOD PRESSURE: 76 MMHG | RESPIRATION RATE: 18 BRPM | HEART RATE: 65 BPM

## 2021-05-29 DIAGNOSIS — S62.109A WRIST FRACTURE: ICD-10-CM
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
## 2021-05-29 DIAGNOSIS — S80.219A KNEE ABRASION: ICD-10-CM

## 2021-05-29 PROCEDURE — 29125 APPL SHORT ARM SPLINT STATIC: CPT | Performed by: PHYSICIAN ASSISTANT

## 2021-05-29 PROCEDURE — 99284 EMERGENCY DEPT VISIT MOD MDM: CPT | Performed by: PHYSICIAN ASSISTANT

## 2021-05-29 PROCEDURE — 99283 EMERGENCY DEPT VISIT LOW MDM: CPT

## 2021-05-29 PROCEDURE — 73110 X-RAY EXAM OF WRIST: CPT

## 2021-05-29 RX ORDER — GINSENG 100 MG
1 CAPSULE ORAL ONCE
Status: COMPLETED | OUTPATIENT
Start: 2021-05-29 | End: 2021-05-29

## 2021-05-29 RX ORDER — IBUPROFEN 800 MG/1
800 TABLET ORAL 3 TIMES DAILY
Qty: 21 TABLET | Refills: 0 | Status: ON HOLD | OUTPATIENT
Start: 2021-05-29 | End: 2021-09-01 | Stop reason: ALTCHOICE

## 2021-05-29 RX ORDER — OXYCODONE HYDROCHLORIDE AND ACETAMINOPHEN 5; 325 MG/1; MG/1
1 TABLET ORAL EVERY 8 HOURS PRN
Qty: 12 TABLET | Refills: 0 | Status: SHIPPED | OUTPATIENT
Start: 2021-05-29 | End: 2021-06-02

## 2021-05-29 RX ORDER — OXYCODONE HYDROCHLORIDE AND ACETAMINOPHEN 5; 325 MG/1; MG/1
1 TABLET ORAL ONCE
Status: COMPLETED | OUTPATIENT
Start: 2021-05-29 | End: 2021-05-29

## 2021-05-29 RX ADMIN — BACITRACIN 1 SMALL APPLICATION: 500 OINTMENT TOPICAL at 14:41

## 2021-05-29 RX ADMIN — OXYCODONE HYDROCHLORIDE AND ACETAMINOPHEN 1 TABLET: 5; 325 TABLET ORAL at 13:46

## 2021-05-29 NOTE — ED PROVIDER NOTES
History  Chief Complaint   Patient presents with    Fall     fell while walking, c/o pain and swelling in left wrist     64-year-old female history of HTN presents with left wrist pain x1 hour  She was walking with her friend outside when she suddenly slipped and fell and landed directly on her left wrist   She is left-hand dominant  There is swelling and pain over her wrist  Able to move her wrist however has worsened pain with doing so  No hand, elbow, shoulder pain  No head injury, LOC, vomiting, visual disturbances  No other injuries or complaints  Prior to Admission Medications   Prescriptions Last Dose Informant Patient Reported? Taking?   escitalopram (LEXAPRO) 10 mg tablet 2021 at Unknown time  No Yes   Sig: Take 1 tablet (10 mg total) by mouth daily   metoprolol tartrate (LOPRESSOR) 25 mg tablet 2021 at Unknown time  No Yes   Sig: TAKE 1 TABLET DAILY      Facility-Administered Medications: None       Past Medical History:   Diagnosis Date    Anal fissure     Anxiety     Depression     History of snoring     Hypertension     PONV (postoperative nausea and vomiting)        Past Surgical History:   Procedure Laterality Date     SECTION      GASTRIC BYPASS      IA COLONOSCOPY FLX DX W/COLLJ SPEC WHEN PFRMD N/A 10/5/2018    Procedure: COLONOSCOPY;  Surgeon: Pat Sommers MD;  [facility];   Service: Colorectal    IA REMOVAL ANAL FISTULA,SUBCUTANEOUS N/A 2/15/2019    Procedure: FISTULOTOMY, EUA, Fissurectomy, removal of external hemorrhoids;  Surgeon: Pat Sommers MD;  [facility];  Service: Colorectal    WISDOM TOOTH EXTRACTION         Family History   Problem Relation Age of Onset   Ardeth Needs Hypertension Mother     Hypertension Father     Other Father         Renal failure    Stomach cancer Father     Heart disease Father     No Known Problems Maternal Aunt     No Known Problems Maternal Aunt     No Known Problems Maternal Aunt     No Known Problems Maternal Aunt     No Known Problems Paternal Aunt     No Known Problems Paternal Aunt     No Known Problems Paternal Aunt      I have reviewed and agree with the history as documented  E-Cigarette/Vaping    E-Cigarette Use Never User      E-Cigarette/Vaping Substances     Social History     Tobacco Use    Smoking status: Never Smoker    Smokeless tobacco: Never Used   Substance Use Topics    Alcohol use: Yes     Frequency: Monthly or less     Drinks per session: 1 or 2     Binge frequency: Never     Comment: Socially    Drug use: No       Review of Systems   Constitutional: Negative for chills and fever  HENT: Negative for sneezing and sore throat  Respiratory: Negative for cough and shortness of breath  Cardiovascular: Negative for chest pain, palpitations and leg swelling  Gastrointestinal: Negative for abdominal pain, constipation, diarrhea, nausea and vomiting  Musculoskeletal: Positive for arthralgias, joint swelling and myalgias  Negative for back pain and gait problem  Skin: Negative for color change, pallor, rash and wound  Neurological: Negative for dizziness, syncope, weakness, light-headedness, numbness and headaches  All other systems reviewed and are negative  Physical Exam  Physical Exam  Vitals signs and nursing note reviewed  Constitutional:       General: She is not in acute distress  Appearance: Normal appearance  She is well-developed and normal weight  She is not diaphoretic  HENT:      Head: Normocephalic and atraumatic  Nose: Nose normal    Eyes:      Extraocular Movements: Extraocular movements intact  Conjunctiva/sclera: Conjunctivae normal    Neck:      Musculoskeletal: Normal range of motion  Cardiovascular:      Rate and Rhythm: Normal rate and regular rhythm  Pulses: Normal pulses  Heart sounds: Normal heart sounds  No murmur  No friction rub  No gallop      Pulmonary:      Effort: Pulmonary effort is normal  No respiratory distress  Breath sounds: Normal breath sounds  No stridor  No wheezing or rales  Comments: Sp02 is 100% indicating adequate oxygenation on room air  Musculoskeletal: Normal range of motion  Arms:         Legs:    Skin:     General: Skin is warm  Capillary Refill: Capillary refill takes less than 2 seconds  Coloration: Skin is not pale  Findings: No erythema or rash  Neurological:      Mental Status: She is alert  Mental status is at baseline  Vital Signs  ED Triage Vitals [05/29/21 1329]   Temperature Pulse Respirations Blood Pressure SpO2   (!) 95 9 °F (35 5 °C) 65 18 132/76 100 %      Temp Source Heart Rate Source Patient Position - Orthostatic VS BP Location FiO2 (%)   Tympanic Monitor Sitting Right arm --      Pain Score       9           Vitals:    05/29/21 1329   BP: 132/76   Pulse: 65   Patient Position - Orthostatic VS: Sitting         Visual Acuity      ED Medications  Medications   oxyCODONE-acetaminophen (PERCOCET) 5-325 mg per tablet 1 tablet (1 tablet Oral Given 5/29/21 1346)   bacitracin topical ointment 1 small application (1 small application Topical Given 5/29/21 1441)       Diagnostic Studies  Results Reviewed     None                 XR wrist 3+ views LEFT   Final Result by Rick Jules MD (05/29 1436)      Acute comminuted intra-articular fracture of the distal radius with moderate compaction injury  Focal displaced ulnar styloid fracture  No dislocation injury  Workstation performed: [id number]                    Procedures  Splint application    Date/Time: 5/29/2021 2:05 PM  Performed by: Maral Alanis PA-C  Authorized by: Maral Alanis PA-C   Universal Protocol:  Consent: Verbal consent obtained    Risks and benefits: risks, benefits and alternatives were discussed  Consent given by: patient  Patient understanding: patient states understanding of the procedure being performed  Patient consent: the patient's understanding of the procedure matches consent given  Procedure consent: procedure consent matches procedure scheduled  Relevant documents: relevant documents present and verified  Test results: test results available and properly labeled  Site marked: the operative site was marked  Radiology Images displayed and confirmed  If images not available, report reviewed: imaging studies available  Required items: required blood products, implants, devices, and special equipment available  Patient identity confirmed: verbally with patient      Pre-procedure details:     Sensation:  Normal  Procedure details:     Laterality:  Left    Location:  Wrist    Wrist:  L wrist    Splint type:  Volar short arm    Supplies:  Ortho-Glass, cotton padding and elastic bandage  Post-procedure details:     Pain:  Improved    Sensation:  Normal    Patient tolerance of procedure: Tolerated well, no immediate complications  Comments:      Good neurovascular exam before and after splint placement             ED Course                             SBIRT 20yo+      Most Recent Value   SBIRT (24 yo +)   In order to provide better care to our patients, we are screening all of our patients for alcohol and drug use  Would it be okay to ask you these screening questions? No Filed at: 05/29/2021 1448                    MDM  Number of Diagnoses or Management Options  Fall, initial encounter:   Knee abrasion:   Wrist fracture:   Diagnosis management comments: Placed in splint with good neurovascular exam before and after splint placement  Given sling for comfort  Advised rest, ice, ibuprofen as needed for pain, percocet for breakthrough pain  Given orthopedic follow-up  Applied bacitracin and covered knee abrasion  Gave patient proper education regarding diagnosis  Answered all questions  Return to ED for any worsening of symptoms otherwise follow up with primary care physician for re-evaluation   Discussed plan with patient who verbalized understanding and agreed to plan        Amount and/or Complexity of Data Reviewed  Tests in the radiology section of CPT®: reviewed and ordered  Discussion of test results with the performing providers: yes  Review and summarize past medical records: yes  Discuss the patient with other providers: yes  Independent visualization of images, tracings, or specimens: yes        Disposition  Final diagnoses:   Fall, initial encounter   Wrist fracture   Knee abrasion     Time reflects when diagnosis was documented in both MDM as applicable and the Disposition within this note     Time User Action Codes Description Comment    5/29/2021  2:31 PM Pinkie Sprain Add Shaina Mark  [facility]] Fall, initial encounter     5/29/2021  2:31 PM Pinkie Sprain Add [I07 660K] Wrist fracture     5/29/2021  2:31 PM Pinkie Sprain Add [L52 038C] Knee abrasion       ED Disposition     ED Disposition Condition Date/Time Comment    Discharge Stable Sat May 29, 2021  2:31 PM Chilango Saul discharge to home/self care              Follow-up Information     Follow up With Specialties Details Why Contact Info Additional Information    Christopher Laura MD Orthopedic Surgery Schedule an appointment as soon as possible for a visit in 3 days follow up wrist fracture [phone] Emergency Department Emergency Medicine Go to  As needed [address] [facility] [address] [id number] [address] Emergency Department, [facility], [address]          Discharge Medication List as of 5/29/2021  2:36 PM      START taking these medications    Details   ibuprofen (MOTRIN) 800 mg tablet Take 1 tablet (800 mg total) by mouth 3 (three) times a day, Starting Sat 5/29/2021, Normal      oxyCODONE-acetaminophen (PERCOCET) 5-325 mg per tablet Take 1 tablet by mouth every 8 (eight) hours as needed for severe pain for up to 4 daysMax Daily Amount: 3 tablets, Starting Sat 5/29/2021, Until Wed 6/2/2021, Normal         CONTINUE these medications which have NOT CHANGED    Details   escitalopram (LEXAPRO) 10 mg tablet Take 1 tablet (10 mg total) by mouth daily, Starting Wed 4/7/2021, Normal      metoprolol tartrate (LOPRESSOR) 25 mg tablet TAKE 1 TABLET DAILY, Normal           No discharge procedures on file      PDMP Review     None          ED Provider  Electronically Signed by           Maribel Cadet PA-C  05/29/21 0003

## 2021-06-01 ENCOUNTER — OFFICE VISIT (OUTPATIENT)
Dept: OBGYN CLINIC | Facility: CLINIC | Age: 56
End: 2021-06-01
Payer: COMMERCIAL

## 2021-06-01 ENCOUNTER — TELEPHONE (OUTPATIENT)
Dept: OBGYN CLINIC | Facility: HOSPITAL | Age: 56
End: 2021-06-01

## 2021-06-01 ENCOUNTER — HOSPITAL ENCOUNTER (OUTPATIENT)
Dept: RADIOLOGY | Facility: HOSPITAL | Age: 56
Discharge: HOME | End: 2021-06-01
Attending: ORTHOPAEDIC SURGERY
Payer: COMMERCIAL

## 2021-06-01 VITALS
BODY MASS INDEX: 32.95 KG/M2 | DIASTOLIC BLOOD PRESSURE: 96 MMHG | SYSTOLIC BLOOD PRESSURE: 163 MMHG | HEIGHT: 66 IN | WEIGHT: 205 LBS | HEART RATE: 89 BPM

## 2021-06-01 DIAGNOSIS — S52.502A CLOSED FRACTURE OF DISTAL END OF LEFT RADIUS, UNSPECIFIED FRACTURE MORPHOLOGY, INITIAL ENCOUNTER: ICD-10-CM

## 2021-06-01 DIAGNOSIS — S52.502A CLOSED FRACTURE OF DISTAL END OF LEFT RADIUS, UNSPECIFIED FRACTURE MORPHOLOGY, INITIAL ENCOUNTER: Primary | ICD-10-CM

## 2021-06-01 PROCEDURE — 3008F BODY MASS INDEX DOCD: CPT | Performed by: ORTHOPAEDIC SURGERY

## 2021-06-01 PROCEDURE — 1036F TOBACCO NON-USER: CPT | Performed by: ORTHOPAEDIC SURGERY

## 2021-06-01 PROCEDURE — 29075 APPL CST ELBW FNGR SHORT ARM: CPT | Performed by: ORTHOPAEDIC SURGERY

## 2021-06-01 PROCEDURE — 73200 CT UPPER EXTREMITY W/O DYE: CPT

## 2021-06-01 PROCEDURE — 99203 OFFICE O/P NEW LOW 30 MIN: CPT | Performed by: ORTHOPAEDIC SURGERY

## 2021-06-01 RX ORDER — CEFAZOLIN SODIUM 2 G/50ML
2000 SOLUTION INTRAVENOUS ONCE
Status: CANCELLED | OUTPATIENT
Start: 2021-06-09 | End: 2021-06-01

## 2021-06-01 NOTE — H&P (VIEW-ONLY)
Assessment/Plan:  1  Closed fracture of distal end of left radius, unspecified fracture morphology, initial encounter  CT upper extremity wo contrast left       Scribe Attestation    I,:  Krystle Rodriguez MA am acting as a scribe while in the presence of the attending physician :       I,:  Guille Johnson DO personally performed the services described in this documentation    as scribed in my presence :             I discussed with Jono Beard that x-rays demonstrate a distal radius fracture  A STAT CT scan was ordered today to better evaluate fracture fragments  I discussed with her today, we may be able to treat this non operatively depending on the results of the CT scan  A short arm cast was applied in the office today  She will remain nonweightbearing with the LUE  She will follow up in the office on Monday for repeat evaluation and to review the CT scan  We will get x-rays upon arrival in the cast  She is aware if she does require surgical intervention this will be performed next Wednesday  Addendum:  CT reviewed  Patient has significant intra-articular comminution the distal radius fracture  This fracture is very distal and I am unsure whether these fragments can accommodate hardware  We will plan for surgery in the form of open reduction internal fixation with a dorsal spanning plate next week  This was discussed at length with the patient  Risks benefits alternatives were described  All questions were answered  Subjective:   Giovanni Pascual is a 60 y o  female who presents to the office today for evaluation of left wrist pain  Patient states she had a trip and fall on 5/29/21  She states she noted immediate pain about her wrist  She presented to the ED after the injury where x-rays were taken  Patient was placed in a splint and told to follow up with orthopedics  She has been tolerating the splint well  She notes pain gloabllay about her wrist  She also notes bruising to the volar aspect  patient has been taking Tylenol OTC for pain  She was prescribed percocet from the ED which she took twice  She denies any numbness or tingling  Review of Systems   Constitutional: Negative for chills and fever  HENT: Negative for drooling and sneezing  Eyes: Negative for redness  Respiratory: Negative for cough and wheezing  Gastrointestinal: Negative for nausea and vomiting  Musculoskeletal: Negative for arthralgias, joint swelling and myalgias  Neurological: Negative for weakness and numbness  Psychiatric/Behavioral: Negative for behavioral problems  The patient is not nervous/anxious  Past Medical History:   Diagnosis Date    Anal fissure     Anxiety     Depression     History of snoring     Hypertension     PONV (postoperative nausea and vomiting)        Past Surgical History:   Procedure Laterality Date     SECTION      GASTRIC BYPASS      IL COLONOSCOPY FLX DX W/COLLJ SPEC WHEN PFRMD N/A 10/5/2018    Procedure: COLONOSCOPY;  Surgeon: Abraham Tony MD;  [facility];   Service: Colorectal    IL REMOVAL ANAL FISTULA,SUBCUTANEOUS N/A 2/15/2019    Procedure: FISTULOTOMY, EUA, Fissurectomy, removal of external hemorrhoids;  Surgeon: Abraham Tony MD;  [facility];  Service: Colorectal    WISDOM TOOTH EXTRACTION         Family History   Problem Relation Age of Onset    Hypertension Mother     Hypertension Father     Other Father         Renal failure    Stomach cancer Father     Heart disease Father     No Known Problems Maternal Aunt     No Known Problems Maternal Aunt     No Known Problems Maternal Aunt     No Known Problems Maternal Aunt     No Known Problems Paternal Aunt     No Known Problems Paternal Aunt     No Known Problems Paternal Aunt        Social History     Occupational History    Not on file   Tobacco Use    Smoking status: Never Smoker    Smokeless tobacco: Never Used   Substance and Sexual Activity    Alcohol use: Yes     Frequency: Monthly or less     Drinks per session: 1 or 2     Binge frequency: Never     Comment: Socially    Drug use: No    Sexual activity: Not on file         Current Outpatient Medications:     escitalopram (LEXAPRO) 10 mg tablet, Take 1 tablet (10 mg total) by mouth daily, Disp: 90 tablet, Rfl: 0    metoprolol tartrate (LOPRESSOR) 25 mg tablet, TAKE 1 TABLET DAILY, Disp: 30 tablet, Rfl: 5    oxyCODONE-acetaminophen (PERCOCET) 5-325 mg per tablet, Take 1 tablet by mouth every 8 (eight) hours as needed for severe pain for up to 4 daysMax Daily Amount: 3 tablets, Disp: 12 tablet, Rfl: 0    ibuprofen (MOTRIN) 800 mg tablet, Take 1 tablet (800 mg total) by mouth 3 (three) times a day (Patient not taking: Reported on 6/1/2021), Disp: 21 tablet, Rfl: 0    No Known Allergies    Objective:  Vitals:    06/01/21 0923   BP: 163/96   Pulse: 89       Ortho Exam     Left wrist    FDS FDP ext intact  EPL FPL intact   Compartments soft  Brisk capillary refill  S/m intact median, radial, and ulnar nerve   Mild deformity about the distal radius    Physical Exam  Constitutional:       Appearance: She is well-developed  HENT:      Head: Normocephalic and atraumatic  Eyes:      General:         Right eye: No discharge  Left eye: No discharge  Conjunctiva/sclera: Conjunctivae normal    Neck:      Musculoskeletal: Normal range of motion and neck supple  Cardiovascular:      Rate and Rhythm: Normal rate  Pulmonary:      Effort: Pulmonary effort is normal  No respiratory distress  Musculoskeletal:      Comments: As noted in HPI   Skin:     General: Skin is warm and dry  Neurological:      Mental Status: She is alert and oriented to person, place, and time  Psychiatric:         Behavior: Behavior normal          Thought Content:  Thought content normal          Judgment: Judgment normal          I have personally reviewed pertinent films in PACS and my interpretation is as follows:X-ray left wrist performed on 5/29/21 demonstrates distal radius fracture  It is min displaced in good alignment, however may have intra art involvement

## 2021-06-01 NOTE — TELEPHONE ENCOUNTER
Dr Cleone Gaucher from radiology is calling with immediate findings on CT scan  Call transferred to nurse

## 2021-06-01 NOTE — PROGRESS NOTES
Assessment/Plan:  1  Closed fracture of distal end of left radius, unspecified fracture morphology, initial encounter  CT upper extremity wo contrast left       Scribe Attestation    I,:  Krystle Rodriguez MA am acting as a scribe while in the presence of the attending physician :       I,:  Manpreet Matute DO personally performed the services described in this documentation    as scribed in my presence :             I discussed with Rolando Main that x-rays demonstrate a distal radius fracture  A STAT CT scan was ordered today to better evaluate fracture fragments  I discussed with her today, we may be able to treat this non operatively depending on the results of the CT scan  A short arm cast was applied in the office today  She will remain nonweightbearing with the LUE  She will follow up in the office on Monday for repeat evaluation and to review the CT scan  We will get x-rays upon arrival in the cast  She is aware if she does require surgical intervention this will be performed next Wednesday  Addendum:  CT reviewed  Patient has significant intra-articular comminution the distal radius fracture  This fracture is very distal and I am unsure whether these fragments can accommodate hardware  We will plan for surgery in the form of open reduction internal fixation with a dorsal spanning plate next week  This was discussed at length with the patient  Risks benefits alternatives were described  All questions were answered  Subjective:   Latha Peck is a 60 y o  female who presents to the office today for evaluation of left wrist pain  Patient states she had a trip and fall on 5/29/21  She states she noted immediate pain about her wrist  She presented to the ED after the injury where x-rays were taken  Patient was placed in a splint and told to follow up with orthopedics  She has been tolerating the splint well  She notes pain gloabllay about her wrist  She also notes bruising to the volar aspect  patient has been taking Tylenol OTC for pain  She was prescribed percocet from the ED which she took twice  She denies any numbness or tingling  Review of Systems   Constitutional: Negative for chills and fever  HENT: Negative for drooling and sneezing  Eyes: Negative for redness  Respiratory: Negative for cough and wheezing  Gastrointestinal: Negative for nausea and vomiting  Musculoskeletal: Negative for arthralgias, joint swelling and myalgias  Neurological: Negative for weakness and numbness  Psychiatric/Behavioral: Negative for behavioral problems  The patient is not nervous/anxious  Past Medical History:   Diagnosis Date    Anal fissure     Anxiety     Depression     History of snoring     Hypertension     PONV (postoperative nausea and vomiting)        Past Surgical History:   Procedure Laterality Date     SECTION      GASTRIC BYPASS      ID COLONOSCOPY FLX DX W/COLLJ SPEC WHEN PFRMD N/A 10/5/2018    Procedure: COLONOSCOPY;  Surgeon: Kendrick Ahumada, MD;  [facility];   Service: Colorectal    ID REMOVAL ANAL FISTULA,SUBCUTANEOUS N/A 2/15/2019    Procedure: FISTULOTOMY, EUA, Fissurectomy, removal of external hemorrhoids;  Surgeon: Kendrick Ahumada, MD;  [facility];  Service: Colorectal    WISDOM TOOTH EXTRACTION         Family History   Problem Relation Age of Onset    Hypertension Mother     Hypertension Father     Other Father         Renal failure    Stomach cancer Father     Heart disease Father     No Known Problems Maternal Aunt     No Known Problems Maternal Aunt     No Known Problems Maternal Aunt     No Known Problems Maternal Aunt     No Known Problems Paternal Aunt     No Known Problems Paternal Aunt     No Known Problems Paternal Aunt        Social History     Occupational History    Not on file   Tobacco Use    Smoking status: Never Smoker    Smokeless tobacco: Never Used   Substance and Sexual Activity    Alcohol use: Yes     Frequency: Monthly or less     Drinks per session: 1 or 2     Binge frequency: Never     Comment: Socially    Drug use: No    Sexual activity: Not on file         Current Outpatient Medications:     escitalopram (LEXAPRO) 10 mg tablet, Take 1 tablet (10 mg total) by mouth daily, Disp: 90 tablet, Rfl: 0    metoprolol tartrate (LOPRESSOR) 25 mg tablet, TAKE 1 TABLET DAILY, Disp: 30 tablet, Rfl: 5    oxyCODONE-acetaminophen (PERCOCET) 5-325 mg per tablet, Take 1 tablet by mouth every 8 (eight) hours as needed for severe pain for up to 4 daysMax Daily Amount: 3 tablets, Disp: 12 tablet, Rfl: 0    ibuprofen (MOTRIN) 800 mg tablet, Take 1 tablet (800 mg total) by mouth 3 (three) times a day (Patient not taking: Reported on 6/1/2021), Disp: 21 tablet, Rfl: 0    No Known Allergies    Objective:  Vitals:    06/01/21 0923   BP: 163/96   Pulse: 89       Ortho Exam     Left wrist    FDS FDP ext intact  EPL FPL intact   Compartments soft  Brisk capillary refill  S/m intact median, radial, and ulnar nerve   Mild deformity about the distal radius    Physical Exam  Constitutional:       Appearance: She is well-developed  HENT:      Head: Normocephalic and atraumatic  Eyes:      General:         Right eye: No discharge  Left eye: No discharge  Conjunctiva/sclera: Conjunctivae normal    Neck:      Musculoskeletal: Normal range of motion and neck supple  Cardiovascular:      Rate and Rhythm: Normal rate  Pulmonary:      Effort: Pulmonary effort is normal  No respiratory distress  Musculoskeletal:      Comments: As noted in HPI   Skin:     General: Skin is warm and dry  Neurological:      Mental Status: She is alert and oriented to person, place, and time  Psychiatric:         Behavior: Behavior normal          Thought Content:  Thought content normal          Judgment: Judgment normal          I have personally reviewed pertinent films in PACS and my interpretation is as follows:X-ray left wrist performed on 5/29/21 demonstrates distal radius fracture  It is min displaced in good alignment, however may have intra art involvement

## 2021-06-02 ENCOUNTER — APPOINTMENT (OUTPATIENT)
Dept: LAB | Facility: CLINIC | Age: 56
End: 2021-06-02
Payer: COMMERCIAL

## 2021-06-02 ENCOUNTER — TELEPHONE (OUTPATIENT)
Dept: OBGYN CLINIC | Facility: CLINIC | Age: 56
End: 2021-06-02

## 2021-06-02 DIAGNOSIS — S52.502A CLOSED FRACTURE OF DISTAL END OF LEFT RADIUS, UNSPECIFIED FRACTURE MORPHOLOGY, INITIAL ENCOUNTER: ICD-10-CM

## 2021-06-02 LAB
ANION GAP SERPL CALCULATED.3IONS-SCNC: 5 MMOL/L (ref 4–13)
BASOPHILS # BLD AUTO: 0.02 THOUSANDS/ΜL (ref 0–0.1)
BASOPHILS NFR BLD AUTO: 0 % (ref 0–1)
BUN SERPL-MCNC: 12 MG/DL (ref 5–25)
CALCIUM SERPL-MCNC: 8.9 MG/DL (ref 8.3–10.1)
CHLORIDE SERPL-SCNC: 108 MMOL/L (ref 100–108)
CO2 SERPL-SCNC: 26 MMOL/L (ref 21–32)
CREAT SERPL-MCNC: 0.79 MG/DL (ref 0.6–1.3)
EOSINOPHIL # BLD AUTO: 0.14 THOUSAND/ΜL (ref 0–0.61)
EOSINOPHIL NFR BLD AUTO: 2 % (ref 0–6)
ERYTHROCYTE [DISTWIDTH] IN BLOOD BY AUTOMATED COUNT: 14.8 % (ref 11.6–15.1)
GFR SERPL CREATININE-BSD FRML MDRD: 83 ML/MIN/1.73SQ M
GLUCOSE SERPL-MCNC: 92 MG/DL (ref 65–140)
HCT VFR BLD AUTO: 34.7 % (ref 34.8–46.1)
HGB BLD-MCNC: 11 G/DL (ref 11.5–15.4)
IMM GRANULOCYTES # BLD AUTO: 0.02 THOUSAND/UL (ref 0–0.2)
IMM GRANULOCYTES NFR BLD AUTO: 0 % (ref 0–2)
LYMPHOCYTES # BLD AUTO: 0.93 THOUSANDS/ΜL (ref 0.6–4.47)
LYMPHOCYTES NFR BLD AUTO: 15 % (ref 14–44)
MCH RBC QN AUTO: 26.5 PG (ref 26.8–34.3)
MCHC RBC AUTO-ENTMCNC: 31.7 G/DL (ref 31.4–37.4)
MCV RBC AUTO: 84 FL (ref 82–98)
MONOCYTES # BLD AUTO: 0.55 THOUSAND/ΜL (ref 0.17–1.22)
MONOCYTES NFR BLD AUTO: 9 % (ref 4–12)
NEUTROPHILS # BLD AUTO: 4.67 THOUSANDS/ΜL (ref 1.85–7.62)
NEUTS SEG NFR BLD AUTO: 74 % (ref 43–75)
NRBC BLD AUTO-RTO: 0 /100 WBCS
PLATELET # BLD AUTO: 299 THOUSANDS/UL (ref 149–390)
PMV BLD AUTO: 9.9 FL (ref 8.9–12.7)
POTASSIUM SERPL-SCNC: 3.8 MMOL/L (ref 3.5–5.3)
RBC # BLD AUTO: 4.15 MILLION/UL (ref 3.81–5.12)
SODIUM SERPL-SCNC: 139 MMOL/L (ref 136–145)
WBC # BLD AUTO: 6.33 THOUSAND/UL (ref 4.31–10.16)

## 2021-06-02 PROCEDURE — 85025 COMPLETE CBC W/AUTO DIFF WBC: CPT

## 2021-06-02 PROCEDURE — 80048 BASIC METABOLIC PNL TOTAL CA: CPT

## 2021-06-02 PROCEDURE — 36415 COLL VENOUS BLD VENIPUNCTURE: CPT

## 2021-06-02 NOTE — TELEPHONE ENCOUNTER
Will you be using a nerve blocker for surgery on 6/9/21? If not, she would like to request it, on the advise of a close friend that is a RN  When will she be able to drive?

## 2021-06-03 DIAGNOSIS — S52.502A CLOSED FRACTURE OF DISTAL END OF LEFT RADIUS, UNSPECIFIED FRACTURE MORPHOLOGY, INITIAL ENCOUNTER: ICD-10-CM

## 2021-06-03 PROCEDURE — U0005 INFEC AGEN DETEC AMPLI PROBE: HCPCS | Performed by: ORTHOPAEDIC SURGERY

## 2021-06-03 PROCEDURE — U0003 INFECTIOUS AGENT DETECTION BY NUCLEIC ACID (DNA OR RNA); SEVERE ACUTE RESPIRATORY SYNDROME CORONAVIRUS 2 (SARS-COV-2) (CORONAVIRUS DISEASE [COVID-19]), AMPLIFIED PROBE TECHNIQUE, MAKING USE OF HIGH THROUGHPUT TECHNOLOGIES AS DESCRIBED BY CMS-2020-01-R: HCPCS | Performed by: ORTHOPAEDIC SURGERY

## 2021-06-04 LAB — SARS-COV-2 RNA RESP QL NAA+PROBE: NEGATIVE

## 2021-06-04 RX ORDER — CALCIUM POLYCARBOPHIL 625 MG 625 MG/1
625 TABLET ORAL DAILY
COMMUNITY

## 2021-06-04 RX ORDER — CALCIUM CARBONATE 200(500)MG
1 TABLET,CHEWABLE ORAL DAILY
COMMUNITY

## 2021-06-04 NOTE — PRE-PROCEDURE INSTRUCTIONS
My Surgical Experience    The following information was developed to assist you to prepare for your operation  What do I need to do before coming to the hospital?   Arrange for a responsible person to drive you to and from the hospital    Arrange care for your children at home  Children are not allowed in the recovery areas of the hospital   Plan to wear clothing that is easy to put on and take off  If you are having shoulder surgery, wear a shirt that buttons or zippers in the front  Bathing  o Shower the evening before and the morning of your surgery with an antibacterial soap  Please refer to the Pre Op Showering Instructions for Surgery Patients Sheet   o Remove nail polish and all body piercing jewelry  o Do not shave any body part for at least 24 hours before surgery-this includes face, arms, legs and upper body  Food  o Nothing to eat or drink after midnight the night before your surgery  This includes candy and chewing gum  o Exception: If your surgery is after 12:00pm (noon), you may have clear liquids such as 7-Up®, ginger ale, apple or cranberry juice, Jell-O®, water, or clear broth until 8:00 am  o Do not drink milk or juice with pulp on the morning before surgery  o Do not drink alcohol 24 hours before surgery  Medicine  o Follow instructions you received from your surgeon about which medicines you may take on the day of surgery  o If instructed to take medicine on the morning of surgery, take pills with just a small sip of water  Call your prescribing doctor for specific infroamtion on what to do if you take insulin    What should I bring to the hospital?    Bring:  [employer] or a walker, if you have them, for foot or knee surgery   A list of the daily medicines, vitamins, minerals, herbals and nutritional supplements you take   Include the dosages of medicines and the time you take them each day   Glasses, dentures or hearing aids   Minimal clothing; you will be wearing hospital sleepwear   Photo ID; required to verify your identity   If you have a Living Will or Power of , bring a copy of the documents   If you have an ostomy, bring an extra pouch and any supplies you use    Do not bring   Medicines or inhalers   Money, valuables or jewelry    What other information should I know about the day of surgery?  Notify your surgeons if you develop a cold, sore throat, cough, fever, rash or any other illness   Report to the Ambulatory Surgical/Same Day Surgery Unit   You will be instructed to stop at Registration only if you have not been pre-registered   Inform your  fi they do not stay that they will be asked by the staff to leave a phone number where they can be reached   Be available to be reached before surgery  In the event the operating room schedule changes, you may be asked to come in earlier or later than expected    *It is important to tell your doctor and others involved in your health care if you are taking or have been taking any non-prescription drugs, vitamins, minerals, herbals or other nutritional supplements  Any of these may interact with some food or medicines and cause a reaction      Pre-Surgery Instructions:   Medication Instructions    calcium carbonate (TUMS) 500 mg chewable tablet Instructed patient per Anesthesia Guidelines   calcium polycarbophil (FIBERCON) 625 mg tablet Instructed patient per Anesthesia Guidelines   escitalopram (LEXAPRO) 10 mg tablet Instructed patient per Anesthesia Guidelines   metoprolol tartrate (LOPRESSOR) 25 mg tablet Instructed patient per Anesthesia Guidelines  To take lexapro and metoprolol a m  of surgery

## 2021-06-07 ENCOUNTER — ANESTHESIA EVENT (OUTPATIENT)
Dept: PERIOP | Facility: HOSPITAL | Age: 56
End: 2021-06-07
Payer: COMMERCIAL

## 2021-06-07 PROBLEM — Z98.84 H/O BARIATRIC SURGERY: Status: ACTIVE | Noted: 2021-06-07

## 2021-06-07 PROBLEM — Z98.890 PONV (POSTOPERATIVE NAUSEA AND VOMITING): Status: ACTIVE | Noted: 2021-06-07

## 2021-06-07 PROBLEM — R11.2 PONV (POSTOPERATIVE NAUSEA AND VOMITING): Status: ACTIVE | Noted: 2021-06-07

## 2021-06-07 PROBLEM — F32.A DEPRESSION: Status: ACTIVE | Noted: 2021-06-07

## 2021-06-07 NOTE — ANESTHESIA PREPROCEDURE EVALUATION
Procedure:  OPEN REDUCTION W/ INTERNAL FIXATION (ORIF) RADIUS / ULNA (WRIST) (Left Wrist)    Relevant Problems   ANESTHESIA   (+) PONV (postoperative nausea and vomiting)      CARDIO   (+) Benign essential hypertension   (+) Mixed hyperlipidemia      GI/HEPATIC   (+) H/O bariatric surgery - bypass      NEURO/PSYCH   (+) Anxiety disorder   (+) Depression      PULMONARY   (+) Obstructive sleep apnea        Physical Exam    Airway    Mallampati score: II  TM Distance: >3 FB  Neck ROM: full     Dental       Cardiovascular  Rhythm: regular, Rate: normal,     Pulmonary  Breath sounds clear to auscultation,     Other Findings        Anesthesia Plan  ASA Score- 2     Anesthesia Type- IV sedation with anesthesia and regional with ASA Monitors  Additional Monitors:   Airway Plan:     Comment: Left supraclavicular block  Plan Factors-    Chart reviewed  Patient is not a current smoker  Induction- intravenous  Postoperative Plan-     Informed Consent- Anesthetic plan and risks discussed with patient  I personally reviewed this patient with the CRNA  Discussed and agreed on the Anesthesia Plan with the CRNA  Eddi Green

## 2021-06-09 ENCOUNTER — HOSPITAL ENCOUNTER (OUTPATIENT)
Facility: HOSPITAL | Age: 56
Setting detail: OUTPATIENT SURGERY
Discharge: HOME | End: 2021-06-09
Attending: ORTHOPAEDIC SURGERY | Admitting: ORTHOPAEDIC SURGERY
Payer: COMMERCIAL

## 2021-06-09 ENCOUNTER — APPOINTMENT (OUTPATIENT)
Dept: RADIOLOGY | Facility: HOSPITAL | Age: 56
End: 2021-06-09
Payer: COMMERCIAL

## 2021-06-09 ENCOUNTER — TELEPHONE (OUTPATIENT)
Dept: OBGYN CLINIC | Facility: CLINIC | Age: 56
End: 2021-06-09

## 2021-06-09 ENCOUNTER — ANESTHESIA (OUTPATIENT)
Dept: PERIOP | Facility: HOSPITAL | Age: 56
End: 2021-06-09
Payer: COMMERCIAL

## 2021-06-09 VITALS
TEMPERATURE: 97.4 F | OXYGEN SATURATION: 97 % | RESPIRATION RATE: 20 BRPM | SYSTOLIC BLOOD PRESSURE: 160 MMHG | BODY MASS INDEX: 33.75 KG/M2 | WEIGHT: 210 LBS | HEART RATE: 71 BPM | DIASTOLIC BLOOD PRESSURE: 93 MMHG | HEIGHT: 66 IN

## 2021-06-09 DIAGNOSIS — S52.602A CLOSED FRACTURE OF LEFT DISTAL RADIUS AND ULNA, INITIAL ENCOUNTER: ICD-10-CM

## 2021-06-09 DIAGNOSIS — S52.502A CLOSED FRACTURE OF LEFT DISTAL RADIUS AND ULNA, INITIAL ENCOUNTER: ICD-10-CM

## 2021-06-09 PROCEDURE — C1713 ANCHOR/SCREW BN/BN,TIS/BN: HCPCS | Performed by: ORTHOPAEDIC SURGERY

## 2021-06-09 PROCEDURE — 73100 X-RAY EXAM OF WRIST: CPT | Performed by: ORTHOPAEDIC SURGERY

## 2021-06-09 PROCEDURE — 25609 OPTX DST RD XART FX/EP SEP3+: CPT | Performed by: PHYSICIAN ASSISTANT

## 2021-06-09 PROCEDURE — 25609 OPTX DST RD XART FX/EP SEP3+: CPT | Performed by: ORTHOPAEDIC SURGERY

## 2021-06-09 PROCEDURE — 73100 X-RAY EXAM OF WRIST: CPT

## 2021-06-09 DEVICE — SCREW CORT 2.7 X 12MM
Type: IMPLANTABLE DEVICE | Site: WRIST | Status: NON-FUNCTIONAL
Removed: 2021-09-01

## 2021-06-09 DEVICE — SCREW CORT 2.7 X 10MM
Type: IMPLANTABLE DEVICE | Site: WRIST | Status: NON-FUNCTIONAL
Removed: 2021-09-01

## 2021-06-09 DEVICE — PLATE BRIDGE
Type: IMPLANTABLE DEVICE | Site: WRIST | Status: NON-FUNCTIONAL
Removed: 2021-09-01

## 2021-06-09 DEVICE — SCREW CORT LCK 2.7 X 14MM
Type: IMPLANTABLE DEVICE | Site: WRIST | Status: NON-FUNCTIONAL
Removed: 2021-09-01

## 2021-06-09 DEVICE — SCREW CORTICAL 3.2 X 12MM
Type: IMPLANTABLE DEVICE | Site: WRIST | Status: NON-FUNCTIONAL
Removed: 2021-09-01

## 2021-06-09 RX ORDER — HYDROCODONE BITARTRATE AND ACETAMINOPHEN 5; 325 MG/1; MG/1
1 TABLET ORAL EVERY 6 HOURS PRN
Qty: 16 TABLET | Refills: 0 | Status: SHIPPED | OUTPATIENT
Start: 2021-06-09 | End: 2021-06-09

## 2021-06-09 RX ORDER — ONDANSETRON 2 MG/ML
INJECTION INTRAMUSCULAR; INTRAVENOUS AS NEEDED
Status: DISCONTINUED | OUTPATIENT
Start: 2021-06-09 | End: 2021-06-09

## 2021-06-09 RX ORDER — MAGNESIUM HYDROXIDE 1200 MG/15ML
LIQUID ORAL AS NEEDED
Status: DISCONTINUED | OUTPATIENT
Start: 2021-06-09 | End: 2021-06-09 | Stop reason: HOSPADM

## 2021-06-09 RX ORDER — HYDROCODONE BITARTRATE AND ACETAMINOPHEN 5; 325 MG/1; MG/1
1 TABLET ORAL EVERY 6 HOURS PRN
Qty: 15 TABLET | Refills: 0 | Status: SHIPPED | OUTPATIENT
Start: 2021-06-09 | End: 2021-09-01 | Stop reason: HOSPADM

## 2021-06-09 RX ORDER — MIDAZOLAM HYDROCHLORIDE 2 MG/2ML
INJECTION, SOLUTION INTRAMUSCULAR; INTRAVENOUS AS NEEDED
Status: DISCONTINUED | OUTPATIENT
Start: 2021-06-09 | End: 2021-06-09

## 2021-06-09 RX ORDER — CEFAZOLIN SODIUM 2 G/50ML
2000 SOLUTION INTRAVENOUS ONCE
Status: COMPLETED | OUTPATIENT
Start: 2021-06-09 | End: 2021-06-09

## 2021-06-09 RX ORDER — BUPIVACAINE HYDROCHLORIDE 5 MG/ML
INJECTION, SOLUTION PERINEURAL
Status: COMPLETED | OUTPATIENT
Start: 2021-06-09 | End: 2021-06-09

## 2021-06-09 RX ORDER — FENTANYL CITRATE/PF 50 MCG/ML
25 SYRINGE (ML) INJECTION
Status: DISCONTINUED | OUTPATIENT
Start: 2021-06-09 | End: 2021-06-09 | Stop reason: HOSPADM

## 2021-06-09 RX ORDER — PROPOFOL 10 MG/ML
INJECTION, EMULSION INTRAVENOUS AS NEEDED
Status: DISCONTINUED | OUTPATIENT
Start: 2021-06-09 | End: 2021-06-09

## 2021-06-09 RX ORDER — FENTANYL CITRATE 50 UG/ML
INJECTION, SOLUTION INTRAMUSCULAR; INTRAVENOUS AS NEEDED
Status: DISCONTINUED | OUTPATIENT
Start: 2021-06-09 | End: 2021-06-09

## 2021-06-09 RX ORDER — PROPOFOL 10 MG/ML
INJECTION, EMULSION INTRAVENOUS CONTINUOUS PRN
Status: DISCONTINUED | OUTPATIENT
Start: 2021-06-09 | End: 2021-06-09

## 2021-06-09 RX ORDER — GLYCOPYRROLATE 0.2 MG/ML
INJECTION INTRAMUSCULAR; INTRAVENOUS AS NEEDED
Status: DISCONTINUED | OUTPATIENT
Start: 2021-06-09 | End: 2021-06-09

## 2021-06-09 RX ORDER — SODIUM CHLORIDE, SODIUM LACTATE, POTASSIUM CHLORIDE, CALCIUM CHLORIDE 600; 310; 30; 20 MG/100ML; MG/100ML; MG/100ML; MG/100ML
75 INJECTION, SOLUTION INTRAVENOUS CONTINUOUS
Status: DISCONTINUED | OUTPATIENT
Start: 2021-06-09 | End: 2021-06-09 | Stop reason: HOSPADM

## 2021-06-09 RX ORDER — LIDOCAINE HYDROCHLORIDE 10 MG/ML
INJECTION, SOLUTION EPIDURAL; INFILTRATION; INTRACAUDAL; PERINEURAL AS NEEDED
Status: DISCONTINUED | OUTPATIENT
Start: 2021-06-09 | End: 2021-06-09

## 2021-06-09 RX ADMIN — SODIUM CHLORIDE, SODIUM LACTATE, POTASSIUM CHLORIDE, AND CALCIUM CHLORIDE: .6; .31; .03; .02 INJECTION, SOLUTION INTRAVENOUS at 13:11

## 2021-06-09 RX ADMIN — FENTANYL CITRATE 50 MCG: 50 INJECTION, SOLUTION INTRAMUSCULAR; INTRAVENOUS at 13:18

## 2021-06-09 RX ADMIN — SODIUM CHLORIDE, SODIUM LACTATE, POTASSIUM CHLORIDE, AND CALCIUM CHLORIDE 75 ML/HR: .6; .31; .03; .02 INJECTION, SOLUTION INTRAVENOUS at 11:02

## 2021-06-09 RX ADMIN — PROPOFOL 20 MG: 10 INJECTION, EMULSION INTRAVENOUS at 13:31

## 2021-06-09 RX ADMIN — GLYCOPYRROLATE 0.2 MG: 0.2 INJECTION, SOLUTION INTRAMUSCULAR; INTRAVENOUS at 13:21

## 2021-06-09 RX ADMIN — PROPOFOL 100 MG: 10 INJECTION, EMULSION INTRAVENOUS at 13:18

## 2021-06-09 RX ADMIN — CEFAZOLIN SODIUM 2000 MG: 2 SOLUTION INTRAVENOUS at 13:14

## 2021-06-09 RX ADMIN — ONDANSETRON 4 MG: 2 INJECTION INTRAMUSCULAR; INTRAVENOUS at 14:42

## 2021-06-09 RX ADMIN — PROPOFOL 100 MCG/KG/MIN: 10 INJECTION, EMULSION INTRAVENOUS at 13:18

## 2021-06-09 RX ADMIN — MIDAZOLAM 2 MG: 1 INJECTION INTRAMUSCULAR; INTRAVENOUS at 12:18

## 2021-06-09 RX ADMIN — FENTANYL CITRATE 50 MCG: 50 INJECTION, SOLUTION INTRAMUSCULAR; INTRAVENOUS at 12:18

## 2021-06-09 RX ADMIN — BUPIVACAINE HYDROCHLORIDE 30 ML: 5 INJECTION, SOLUTION PERINEURAL at 12:27

## 2021-06-09 RX ADMIN — LIDOCAINE HYDROCHLORIDE 50 MG: 10 INJECTION, SOLUTION EPIDURAL; INFILTRATION; INTRACAUDAL; PERINEURAL at 13:18

## 2021-06-09 NOTE — ANESTHESIA POSTPROCEDURE EVALUATION
Post-Op Assessment Note    CV Status:  Stable  Pain Score: 0    Pain management: adequate     Mental Status:  Alert and awake   Hydration Status:  Euvolemic and stable   PONV Controlled:  Controlled   Airway Patency:  Patent      Post Op Vitals Reviewed: Yes      Staff: Anesthesiologist, CRNA   Comments: Report given to recovering RN, VSS, Pt states she is comfortable        No complications documented      /72 (06/09/21 1504)    Temp (!) 97 4 °F (36 3 °C) (06/09/21 1504)    Pulse 82 (06/09/21 1504)   Resp 18 (06/09/21 1504)    SpO2 96 % (06/09/21 1504)

## 2021-06-09 NOTE — OP NOTE
OPERATIVE REPORT  PATIENT NAME: Dany Matta    :  1965  MRN: [id number]  Pt Location: [facility] OR ROOM 02    SURGERY DATE: 2021    Surgeon(s) and Role:     * Azeb Johns DO - Primary     * Tolu Baptiste PA-C - Assisting    Preop Diagnosis:  Closed fracture of distal end of left radius, unspecified fracture morphology, initial encounter [S52 502A]    Post-Op Diagnosis Codes:     * Closed fracture of distal end of left radius, unspecified fracture morphology, initial encounter [S52 502A]    Procedure(s) (LRB):  OPEN REDUCTION W/ INTERNAL FIXATION (ORIF) RADIUS / ULNA (WRIST) (Left)    Specimen(s):  * No specimens in log *    Estimated Blood Loss:   Minimal    Drains:  * No LDAs found *    Anesthesia Type:   Regional with Sedation    Operative Indications:  Closed fracture of distal end of left radius, unspecified fracture morphology, initial encounter [N75 502A]      Operative Findings:  Comminuted intra art DR fx >3 fragments    Complications:   None    Procedure and Technique:   patient is a 57-year-old female who presented the office after a fall  X-rays demonstrated a left distal radius fracture which was significantly comminuted  CT scan demonstrated a significant comminution of the distal radius fracture  Surgical versus nonsurgical options were discussed with the patient  Risks benefits alternatives were explained  Patient consented to undergo open reduction internal fixation of the left distal radius fracture  After reviewing the CT scan the fracture was significantly distal and significantly comminuted  The fragments were very small  I had a discussion with the patient we decided to proceed with dorsal spanning plate as I did not feel would be able to secure these fragments were with a volar plate  Patient consented this  She understood the plate be removed in 3 months  We discussed surgery extensively  Patient on the day of the surgery was identified by 1st last name    The left upper extremity was marked  Patient with the anesthesia team they deemed that sedation plus  Regional most appropriate  Patient underwent block without complication  Patient was transferred from the preop area the OR underwent sedation without complication  Well-padded tourniquet was placed on the arm  Patient went sterile prep and drape  Time-out was performed successfully  Button was in agreement  I reviewed the consent form myself  Antibiotics had been given  The limb was extended Esmarch bandage and tourniquet was elevated 250 mmHg  Prior to beginning the case we decided to use TriMed dorsal spanning plate  The plate was placed over the 2nd metacarpal on the skin dorsally  X-ray was taken which demonstrated where the plate would be sitting over the 2nd metacarpal and the radial shaft  These areas were marked out with a marking pen  The fracture was then visualized  We then performed a reduction maneuver  This would under fluoroscopic guidance  We noted near anatomic reduction of the fracture on AP and lateral views  Attention was then turned to the dorsal aspect of the hand  A 4 cm incision was made over the 2nd metacarpal   Careful dissection performed through skin and subcutaneous tissue  Extensor tendons were encountered to the index finger  These were these were retracted  We then encountered the periosteum to the 2nd metacarpal   This was released off the dorsal aspect  We then encountered the 2nd dorsal compartment proximally at the base of the 2nd metacarpal   We encountered the 2nd dorsal compartment extensor tendons and freer elevator was placed just deep to this from distal to proximal   We then did create a path for the plate to travel from distal to proximal   We then turned our attention to the proximal aspect of the forearm  An incision was made over the radial shaft proximally in the area the markings that were made prior to beginning the surgery    Careful dissection performed through skin and subcutaneous tissue  Is all neurovascular structures retracted with a dull Heiss retractor  We then encountered the brachioradialis and extensor tendons in the 2nd dorsal compartment  This interval was created  Superficial radial nerve branches were retracted radially with the brachioradialis  The 2nd extensor tendons and muscle belly were were elevated off the  Radial shaft  This gave us excellent position of the radial shaft  A Monroe elevator was then used to create a plane from the proximal aspect of the radial shaft distally to the 2nd metacarpal   This was successful  The TriMed dorsal spanning plate was then placed from distal to proximal through the distal wound in the proximal   We visualized the plate in the proximal distal wound  X-rays were then obtained which demonstrated appropriate placement of the plate  This was done under throbbing all views  The plate was sitting directly on the bone there was no tissue interposed between the plate and the bone  We then proceeded to place screws  Combination of cortical and locking screws were placed distally in the plate to the medics 2nd metacarpal shaft  This was done under fluoroscopic guidance  We then turned our attention proximally  Initially placed a cortical screw in the [place] home of the dorsal spanning plate proximally  This allowed us to fine tune our reduction  Additional traction was placed and the screw was tightened down  Fluoroscopic views of the distal radius fracture demonstrated near anatomic alignment  After happy with our reduction of the fracture we then proceeded the placed additional screws proximally  Cortical screws then placed proximally in the radial shaft  This was done without complication  This under fluoroscopic guidance      Final views were taken of the fracture as well as the hardware demonstrated near anatomic reduction of the fractures were all stable and appropriate placement of hardware  There are no prominent screws  Fingers were taken through range of motion and no tethering of the extensor tendons was noted  After noted appropriate placement of hardware with any soft tissue interposition the tourniquet was released  All bleeding was stopped bipolar cautery direct pressure  The wound was then thoroughly irrigated and closed with 4 0 Vicryl 5 0 running Monocryl suture  Patient placed in a soft sterile dressing and a volar splint  Patient tolerated surgery well  She was transferred the OR PACU stable condition after she woke from anesthesia history of she was placed in a sling  I was present for the entire procedure, A qualified resident physician was not available and A physician assistant was required during the procedure for retraction tissue handling,dissection and suturing  Physician assistant was also necessary for retraction of neurovascular structures as well as the tendons      Patient Disposition:  PACU  and hemodynamically stable    SIGNATURE: Alfred Park DO  DATE: June 9, 2021  TIME: 6:50 PM

## 2021-06-09 NOTE — DISCHARGE INSTRUCTIONS
Dr Charlie Lin Operative Instructions  Left Wrist Open Reduction Internal Fixation    You have had surgery on your arm today, please read and follow the information below:  · Elevate your hand above your elbow during the next 24-48 hours to help with swelling  · Place your hand and arm over your head with motion at your shoulder three times a day  · Do not apply any cream/ointment/oil to your incisions including antibiotics  · Do not soak your hands in standing water (dishwater, tubs, Jacuzzi's, pools, etc ) until given permission (typically 2-3 weeks after injury)    Call the office if you notice any:  · Increased numbness or tingling of your hand or fingers that is not relieved with elevation  · Increasing pain that is not controlled with medication  · Difficulty chewing, breathing, swallowing  · Chest pains or shortness of breath  · Fever over 101 4 degrees  Bandage: Do NOT remove bandage until follow-up appointment  Motion: Move fingers into a fist 5 times a day, DO NOT move any splinted fingers  Weight bearing status: The operated extremity should be non-weight bearing until further notice  Ice: Ice for 10 minutes every hour as needed for swelling x 24 hours  back of wrist    Sling: Sling for comfort for 2-3 days  Pain medication:   Naproxen 220 mg two times a day   Norco/Hydrocodone one tab every 6 hours AS NEEDED for pain     Follow-up Appointment: 10-14 days  Please call the office at [phone] if you have any questions or concerns regarding your post-operative care

## 2021-06-09 NOTE — ANESTHESIA PROCEDURE NOTES
Peripheral Block    Patient location during procedure: holding area  Start time: 6/9/2021 12:26 PM  Reason for block: procedure for pain, at surgeon's request and post-op pain management  Staffing  Anesthesiologist: Marcia Heredia MD  Preanesthetic Checklist  Completed: patient identified, site marked, surgical consent, pre-op evaluation, timeout performed, IV checked, risks and benefits discussed and monitors and equipment checked  Peripheral Block  Patient position: supine  Prep: ChloraPrep  Patient monitoring: heart rate, cardiac monitor, continuous pulse ox and frequent blood pressure checks  Block type: supraclavicular  Laterality: left  Injection technique: single-shot  Procedures: ultrasound guided, Ultrasound guidance required for the procedure to increase accuracy and safety of medication placement and decrease risk of complications    Ultrasound permanent image savedbupivacaine (MARCAINE) 0 5 % perineural infiltration, 30 mL  Needle  Needle type: Stimuplex   Needle gauge: 22 G  Needle length: 10 cm  Needle localization: ultrasound guidance  Assessment  Injection assessment: incremental injection, local visualized surrounding nerve on ultrasound, negative aspiration for CSF, negative aspiration for heme and no paresthesia on injection  Paresthesia pain: none  Heart rate change: no  Slow fractionated injection: yes  Post-procedure:  site cleaned  patient tolerated the procedure well with no immediate complications

## 2021-06-09 NOTE — INTERVAL H&P NOTE
H&P reviewed  After examining the patient I find no changes in the patients condition since the H&P had been written      Vitals:    06/09/21 1051   BP: 164/91   Pulse: 78   Resp: 20   Temp: 98 5 °F (36 9 °C)   SpO2: 98%

## 2021-06-09 NOTE — PERIOPERATIVE NURSING NOTE
Pt d/c to home at this time w/    Via walking  Pt left with all belongings  Iv was D/C intact with dry sterile dressing  Encouraged to keep follow up appointments, Verbalized understanding  D/C instructions reviewed and explained  Verbalized understanding  New Rx given and explained  Verbalized understanding

## 2021-06-10 ENCOUNTER — TELEPHONE (OUTPATIENT)
Dept: OBGYN CLINIC | Facility: CLINIC | Age: 56
End: 2021-06-10

## 2021-06-10 NOTE — TELEPHONE ENCOUNTER
I called patient to make aware that her disability is completed and she can pick it up in the office

## 2021-06-22 ENCOUNTER — OFFICE VISIT (OUTPATIENT)
Dept: OBGYN CLINIC | Facility: CLINIC | Age: 56
End: 2021-06-22

## 2021-06-22 ENCOUNTER — APPOINTMENT (OUTPATIENT)
Dept: RADIOLOGY | Facility: CLINIC | Age: 56
End: 2021-06-22
Payer: COMMERCIAL

## 2021-06-22 VITALS
WEIGHT: 210 LBS | DIASTOLIC BLOOD PRESSURE: 97 MMHG | BODY MASS INDEX: 33.75 KG/M2 | HEIGHT: 66 IN | HEART RATE: 78 BPM | SYSTOLIC BLOOD PRESSURE: 145 MMHG

## 2021-06-22 DIAGNOSIS — S52.602A CLOSED FRACTURE OF LEFT DISTAL RADIUS AND ULNA, INITIAL ENCOUNTER: ICD-10-CM

## 2021-06-22 DIAGNOSIS — Z98.890 S/P ORIF (OPEN REDUCTION INTERNAL FIXATION) FRACTURE: Primary | ICD-10-CM

## 2021-06-22 DIAGNOSIS — S52.502A CLOSED FRACTURE OF LEFT DISTAL RADIUS AND ULNA, INITIAL ENCOUNTER: ICD-10-CM

## 2021-06-22 DIAGNOSIS — Z87.81 S/P ORIF (OPEN REDUCTION INTERNAL FIXATION) FRACTURE: Primary | ICD-10-CM

## 2021-06-22 PROCEDURE — 3008F BODY MASS INDEX DOCD: CPT | Performed by: ORTHOPAEDIC SURGERY

## 2021-06-22 PROCEDURE — 99024 POSTOP FOLLOW-UP VISIT: CPT | Performed by: ORTHOPAEDIC SURGERY

## 2021-06-22 PROCEDURE — 73100 X-RAY EXAM OF WRIST: CPT

## 2021-06-22 NOTE — PROGRESS NOTES
Assessment/Plan:  1  S/P ORIF (open reduction internal fixation) fracture  Ambulatory referral to PT/OT hand therapy   2  Closed fracture of left distal radius and ulna, initial encounter  XR wrist 2 vw left       Scribe Attestation    I,:  Krystle Rodriguez MA am acting as a scribe while in the presence of the attending physician :       I,:  Roslaba Woodall DO personally performed the services described in this documentation    as scribed in my presence  :             78-year-old female 2 weeks status post ORIF dorsal spanning plate left distal radius  Patient is doing well postoperatively  Her incisions are healing well  There is no erythema or signs of infection  She may shower and get the incision wet  A referral was provided to OT for finger and elbow range of motion  Patient is aware the plate will be removed at 3 months  She will remain nonweightbearing with her LUE  She will follow up in 4 weeks for repeat evaluation and repeat x-ray  Subjective:   Shaq De La Rosa is a 60 y o  female who presents to the office today for follow up evaluation 2 weeks s/p ORIF dorsal spanning plate left distal radius performed on 6/9/21  Patient states she is doing well  She states her pain is well controlled  She has been taking Tylenol OTC for pain  She is able to actively move all digits  She denies any fever or chills  Review of Systems   Constitutional: Negative for chills and fever  HENT: Negative for drooling and sneezing  Eyes: Negative for redness  Respiratory: Negative for cough and wheezing  Gastrointestinal: Negative for nausea and vomiting  Musculoskeletal: Negative for arthralgias, joint swelling and myalgias  Neurological: Negative for weakness and numbness  Psychiatric/Behavioral: Negative for behavioral problems  The patient is not nervous/anxious            Past Medical History:   Diagnosis Date    Anal fissure     Anxiety     Depression     History of snoring     Hypertension     PONV (postoperative nausea and vomiting)        Past Surgical History:   Procedure Laterality Date     SECTION      CHOLECYSTECTOMY      GASTRIC BYPASS      [place] COLONOSCOPY FLX DX W/COLLJ SPEC WHEN PFRMD N/A 10/5/2018    Procedure: COLONOSCOPY;  Surgeon: Soni Pacheco MD;  [facility]; Service: Colorectal    AZ OPEN RX DISTAL RADIUS FX, INTRA-ARTICULAR, 3+ FRAG Left 2021    Procedure: OPEN REDUCTION W/ INTERNAL FIXATION (ORIF) RADIUS / ULNA (WRIST);   Surgeon: Sabrina Dsouza DO;  Location: [address] [place] [address];  Service: Orthopedics    AZ REMOVAL ANAL [address] N/A 2/15/2019    Procedure: FISTULOTOMY, EUA, Fissurectomy, removal of external hemorrhoids;  Surgeon: Soni Pacheco MD;  [facility]: [facility];  Service: Colorectal    WISDOM TOOTH EXTRACTION         Family History   Problem Relation Age of Onset   Bonita Riis Hypertension Mother     Hypertension Father     Other Father         Renal failure    Stomach cancer Father     Heart disease Father     No Known Problems Maternal Aunt     No Known Problems Maternal Aunt     No Known Problems Maternal Aunt     No Known Problems Maternal Aunt     No Known Problems Paternal Aunt     No Known Problems Paternal Aunt     No Known Problems Paternal Aunt        Social History     Occupational History    Not on file   Tobacco Use    Smoking status: Never Smoker    Smokeless tobacco: Never Used   Vaping Use    Vaping Use: Never used   Substance and Sexual Activity    Alcohol use: Yes     Comment: Socially    Drug use: No    Sexual activity: Not on file         Current Outpatient Medications:     calcium carbonate (TUMS) 500 mg chewable tablet, Chew 1 tablet daily, Disp: , Rfl:     calcium polycarbophil (FIBERCON) 625 mg tablet, Take 625 mg by mouth daily, Disp: , Rfl:     escitalopram (LEXAPRO) 10 mg tablet, Take 1 tablet (10 mg total) by mouth daily, Disp: 90 tablet, Rfl: 0    HYDROcodone-acetaminophen (NORCO) 5-325 mg per tablet, Take 1 tablet by mouth every 6 (six) hours as needed for painMax Daily Amount: 4 tablets, Disp: 15 tablet, Rfl: 0    metoprolol tartrate (LOPRESSOR) 25 mg tablet, TAKE 1 TABLET DAILY, Disp: 30 tablet, Rfl: 5    ibuprofen (MOTRIN) 800 mg tablet, Take 1 tablet (800 mg total) by mouth 3 (three) times a day (Patient not taking: Reported on 6/1/2021), Disp: 21 tablet, Rfl: 0    No Known Allergies    Objective:  Vitals:    06/22/21 1311   BP: 145/97   Pulse: 78       Ortho Exam     Left wrist    Incisions healing well  No erythema or signs of infection  Compartments soft  Brisk capillary refill  S/m intact median, radial, and ulnar nerve     Physical Exam  Constitutional:       Appearance: She is well-developed  HENT:      Head: Normocephalic and atraumatic  Eyes:      General:         Right eye: No discharge  Left eye: No discharge  Conjunctiva/sclera: Conjunctivae normal    Cardiovascular:      Rate and Rhythm: Normal rate  Pulmonary:      Effort: Pulmonary effort is normal  No respiratory distress  Musculoskeletal:      Cervical back: Normal range of motion and neck supple  Comments: As noted in HPI   Skin:     General: Skin is warm and dry  Neurological:      Mental Status: She is alert and oriented to person, place, and time  Psychiatric:         Behavior: Behavior normal          Thought Content: Thought content normal          Judgment: Judgment normal          I have personally reviewed pertinent films in PACS and my interpretation is as follows:X-ray left wrist performed in the office today demonstrates stable orthopedic hardware

## 2021-06-29 ENCOUNTER — EVALUATION (OUTPATIENT)
Dept: OCCUPATIONAL THERAPY | Facility: CLINIC | Age: 56
End: 2021-06-29
Payer: COMMERCIAL

## 2021-06-29 DIAGNOSIS — Z87.81 S/P ORIF (OPEN REDUCTION INTERNAL FIXATION) FRACTURE: ICD-10-CM

## 2021-06-29 DIAGNOSIS — Z98.890 S/P ORIF (OPEN REDUCTION INTERNAL FIXATION) FRACTURE: ICD-10-CM

## 2021-06-29 PROCEDURE — 97165 OT EVAL LOW COMPLEX 30 MIN: CPT

## 2021-06-29 PROCEDURE — 97110 THERAPEUTIC EXERCISES: CPT

## 2021-06-29 NOTE — PROGRESS NOTES
OT Evaluation     Today's date: 2021  Patient name: Jeremías Chaparro  : 1965  MRN: [id number]  Referring provider: Violette Benton DO  Dx:   Encounter Diagnosis     ICD-10-CM    1  S/P ORIF (open reduction internal fixation) fracture  Z98 890 Ambulatory referral to PT/OT hand therapy    Z87 81        Start Time: 1020  Stop Time: 1100  Total time in clinic (min): 40 minutes    Assessment  Assessment details: Patient reported that she was walking outside on 2021 and that she fell and put her arms out to brace herself  She reported going to the emergency room  Patient underwent ORIF dorsal spanning plate left radius on 2021  Plate is scheduled to be removed in three months  Patient reported that 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  "I dry dishes, but it's all awkward " Patient reported that she is able to complete ADLs independently with minimal difficulties  "There's a little tingle in my thumb "    Jeremías Chaparro is a 60 y o  female who presents with S/P ORIF dorsal spanning plate left distal radius  Patient tolerated session well  Terri Villarreal reported difficulty with activities of daily living secondary to decreased range of motion  Provided home exercise program for digit and elbow range of motion, edema control, and scar management  Patient was able to demonstrate home program past instruction with use of handouts  Patient advised to contact doctor if there is a change of status  Patient advised to discontinue any exercise which cause increased pain  Patient is a good candidate to benefit from skilled occupational therapy to address impairments and return to maximal level of function with minimal symptoms      Impairments: abnormal or restricted ROM, activity intolerance and lacks appropriate home exercise program  Understanding of Dx/Px/POC: good   Prognosis: good    Goals  Short term goals:  Patient to demonstrate understanding of home exercise program in 2 weeks for decreased pain with activities of daily living  Patient to increase composite digital flexion to touch distal jo crease in 4 weeks to aid in holding utensils  Patient to decreased edema by 1 5 cm in wrist to enhance range of motion with grooming  Patient to report a decrease in pain by at least 1 point on a 0-10 scale in 2 weeks to aid in dressing    Long term goals:  Patient to demonstrate functional active range of motion for independent ADL's by time of discharge  Patient to demonstrate functional strength for independent ADL's by time of discharge      Plan  Patient would benefit from: skilled occupational therapy  Planned modality interventions: thermotherapy: hydrocollator packs  Planned therapy interventions: joint mobilization, manual therapy, stretching, therapeutic activities, therapeutic exercise, flexibility, functional ROM exercises and home exercise program  Frequency: 1-2x/week  Duration in weeks: 6  Plan of Care beginning date: 2021  Plan of Care expiration date: 8/10/2021  Treatment plan discussed with: patient        Subjective Evaluation    History of Present Illness  Date of onset: 2021  Date of surgery: 2021  Mechanism of injury: surgery and trauma  Mechanism of injury: Patient reported that she was walking outside on 2021 and that she fell and put her arms out to brace herself  She reported going to the emergency room  Patient underwent ORIF dorsal spanning plate left radius on 2021  Plate is scheduled to be removed in three months  Patient reported that 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  "I dry dishes, but it's all awkward " Patient reported that she is able to complete ADLs independently with minimal difficulties   "There's a little tingle in my thumb "  Quality of life: good    Pain  Current pain ratin  At best pain ratin  At worst pain ratin  Location: left wrist and forearm  Relieving factors: heat    Social Support  Lives with: spouse    Employment status: working ([employer]- out of work currently due to injury     Hobbies: coloring)  Hand dominance: left    Patient Goals  Patient goals for therapy: return to work, decreased edema, decreased pain and increased motion  Patient goal: To get back to normal        Objective     Observations     Left Wrist/Hand   Positive for edema  Active Range of Motion     Left Elbow   Normal active range of motion    Right Elbow   Normal active range of motion    Additional Active Range of Motion Details  Wrist range of motion not assessed   Left wrist has spanning plate across radius preventing wrist range of motion  Distance to distal palmar crease:  Left hand:  Thumb: 1 5 cm  Index: 2 cm  Lon 5 cm  Rin cm  Small:1 5 cm    Swelling     Left Wrist/Hand   Circumference MCP: 21 cm  Circumference wrist: 18 8 cm    Right Wrist/Hand   Circumference MCP: 20 3 cm  Circumference wrist: 16 4 cm             Precautions: spanning plate- no wrist rom, no strengthening    Manuals HEP                        Ther Ex     Tendon glides x10    Digit ext/abd x10    AROM thumb- all planes x10                   Ther Activity                                   Modalities

## 2021-07-06 ENCOUNTER — OFFICE VISIT (OUTPATIENT)
Dept: OCCUPATIONAL THERAPY | Facility: CLINIC | Age: 56
End: 2021-07-06
Payer: COMMERCIAL

## 2021-07-06 DIAGNOSIS — Z87.81 S/P ORIF (OPEN REDUCTION INTERNAL FIXATION) FRACTURE: Primary | ICD-10-CM

## 2021-07-06 DIAGNOSIS — Z98.890 S/P ORIF (OPEN REDUCTION INTERNAL FIXATION) FRACTURE: Primary | ICD-10-CM

## 2021-07-06 PROCEDURE — 97530 THERAPEUTIC ACTIVITIES: CPT

## 2021-07-06 NOTE — PROGRESS NOTES
Daily Note     Today's date: 2021  Patient name: Myles Alex  : 1965  MRN: [id number]  Referring provider: Kris Mock DO  Dx:   Encounter Diagnosis     ICD-10-CM    1  S/P ORIF (open reduction internal fixation) fracture  Z98 890     Z87 81        Start Time: 1012  Stop Time: 1102  Total time in clinic (min): 50 minutes    Subjective: "I can do more with the hand  I can hold a brush  I have more strength to put on my deodorant "      Objective: See treatment diary below       Precautions: spanning plate- no wrist rom, no strengthening    Manuals HEP 2021                       Ther Ex     Tendon glides x10    Digit ext/abd x10 x10   AROM thumb- all planes x10 x10   PROM digits  10 sec x3             Ther Activity     Small pegs- alt digits  x20   Dice- in hand  x15   Iron balls  x15   Wall slides  x10   Towel scrunches  x10        Modalities     Moist heat  x5 min             Assessment: Patient tolerated session well  Session focused on digit range of motion and edema control  Patient tolerated new range of motion, fine motor coordination, and dexterity exercises with some complaints of difficulty  Patient benefiting from occupational therapy to increase range of motion needed for ADLs  Plan: Focus on digit and elbow range of motion and edema control

## 2021-07-07 DIAGNOSIS — F41.9 ANXIETY: ICD-10-CM

## 2021-07-07 RX ORDER — ESCITALOPRAM OXALATE 10 MG/1
10 TABLET ORAL DAILY
Qty: 90 TABLET | Refills: 1 | Status: SHIPPED | OUTPATIENT
Start: 2021-07-07 | End: 2021-11-06

## 2021-07-13 ENCOUNTER — OFFICE VISIT (OUTPATIENT)
Dept: OCCUPATIONAL THERAPY | Facility: CLINIC | Age: 56
End: 2021-07-13
Payer: COMMERCIAL

## 2021-07-13 DIAGNOSIS — Z98.890 S/P ORIF (OPEN REDUCTION INTERNAL FIXATION) FRACTURE: Primary | ICD-10-CM

## 2021-07-13 DIAGNOSIS — Z87.81 S/P ORIF (OPEN REDUCTION INTERNAL FIXATION) FRACTURE: Primary | ICD-10-CM

## 2021-07-13 PROCEDURE — 97530 THERAPEUTIC ACTIVITIES: CPT

## 2021-07-13 PROCEDURE — 97110 THERAPEUTIC EXERCISES: CPT

## 2021-07-13 NOTE — PROGRESS NOTES
Daily Note     Today's date: 2021  Patient name: Marlyn Mayorga  : 1965  MRN: [id number]  Referring provider: Junette Landau, DO  Dx:   Encounter Diagnosis     ICD-10-CM    1  S/P ORIF (open reduction internal fixation) fracture  Z98 890     Z87 81        Start Time: 1820  Stop Time: 1540  Total time in clinic (min): 51 minutes    Subjective: Patient reported that she can get her rings on  Objective: See treatment diary below       Precautions: spanning plate- no wrist rom, no strengthening    Manuals HEP 2021                       Ther Ex     Tendon glides x10    Digit ext/abd x10 x10   AROM thumb- all planes x10 x10   PROM digits  10 sec x3   AROM elbow over towel  x15        Ther Activity     Small pegs- alt digits  x20   Dice- in hand  x15   Iron balls  x15   Wall slides  x10   Towel scrunches  x10        Modalities     Moist heat  x5 min             Assessment: Patient tolerated session well  Session focused on digit range of motion and edema control  Patient tolerated new range of motion, fine motor coordination, and dexterity exercises with some complaints of difficulty  Patient benefiting from occupational therapy to increase range of motion needed for ADLs  Plan: Focus on digit and elbow range of motion and edema control

## 2021-07-20 ENCOUNTER — OFFICE VISIT (OUTPATIENT)
Dept: OBGYN CLINIC | Facility: CLINIC | Age: 56
End: 2021-07-20
Payer: COMMERCIAL

## 2021-07-20 ENCOUNTER — OFFICE VISIT (OUTPATIENT)
Dept: OCCUPATIONAL THERAPY | Facility: CLINIC | Age: 56
End: 2021-07-20
Payer: COMMERCIAL

## 2021-07-20 ENCOUNTER — APPOINTMENT (OUTPATIENT)
Dept: RADIOLOGY | Facility: CLINIC | Age: 56
End: 2021-07-20
Payer: COMMERCIAL

## 2021-07-20 VITALS
SYSTOLIC BLOOD PRESSURE: 144 MMHG | BODY MASS INDEX: 33.75 KG/M2 | HEIGHT: 66 IN | HEART RATE: 81 BPM | WEIGHT: 210 LBS | DIASTOLIC BLOOD PRESSURE: 88 MMHG

## 2021-07-20 DIAGNOSIS — Z87.81 S/P ORIF (OPEN REDUCTION INTERNAL FIXATION) FRACTURE: Primary | ICD-10-CM

## 2021-07-20 DIAGNOSIS — S52.602A CLOSED FRACTURE OF LEFT DISTAL RADIUS AND ULNA, INITIAL ENCOUNTER: ICD-10-CM

## 2021-07-20 DIAGNOSIS — S52.502A CLOSED FRACTURE OF LEFT DISTAL RADIUS AND ULNA, INITIAL ENCOUNTER: Primary | ICD-10-CM

## 2021-07-20 DIAGNOSIS — S52.502A CLOSED FRACTURE OF LEFT DISTAL RADIUS AND ULNA, INITIAL ENCOUNTER: ICD-10-CM

## 2021-07-20 DIAGNOSIS — S52.602A CLOSED FRACTURE OF LEFT DISTAL RADIUS AND ULNA, INITIAL ENCOUNTER: Primary | ICD-10-CM

## 2021-07-20 DIAGNOSIS — Z98.890 S/P ORIF (OPEN REDUCTION INTERNAL FIXATION) FRACTURE: Primary | ICD-10-CM

## 2021-07-20 PROCEDURE — 99024 POSTOP FOLLOW-UP VISIT: CPT | Performed by: ORTHOPAEDIC SURGERY

## 2021-07-20 PROCEDURE — 97110 THERAPEUTIC EXERCISES: CPT

## 2021-07-20 PROCEDURE — 73100 X-RAY EXAM OF WRIST: CPT

## 2021-07-20 PROCEDURE — 3008F BODY MASS INDEX DOCD: CPT | Performed by: ORTHOPAEDIC SURGERY

## 2021-07-20 PROCEDURE — 1036F TOBACCO NON-USER: CPT | Performed by: ORTHOPAEDIC SURGERY

## 2021-07-20 NOTE — PROGRESS NOTES
Daily Note     Today's date: 2021  Patient name: Siva Santos  : 1965  MRN: [id number]  Referring provider: Sahra German DO  Dx:   Encounter Diagnosis     ICD-10-CM    1  S/P ORIF (open reduction internal fixation) fracture  Z98 890     Z87 81        Start Time: 1059  Stop Time: 1140  Total time in clinic (min): 41 minutes    Subjective: Patient offered no change in function      Objective: See treatment diary below       Precautions: spanning plate- no wrist rom    Manuals HEP 2021                        Ther Ex     Tendon glides x10    Digit ext/abd x10 x10   AROM thumb- all planes x10 x10   PROM digits  10 sec x3   AROM elbow over towel  x15   Resisted digit extension  Yellow x10        Ther Activity     Small pegs- alt digits  x20   Dice- in hand  x15   Iron balls  x15   Wall slides  x10   Towel scrunches  x10   Digi-flex  Yellow x10   Theraputty- , roll, pinch  Tan        Modalities     Moist heat  x5 min             Assessment: Patient tolerated session well  Session focused on digit range of motion, edema control, and gentle strengthening  Patient saw doctor this morning and per patient she is able to do gentle digit strengthening  Patient tolerated introduction of gentle strengthening exercises without complaints of pain  Provided patient with new tubi- secondary to decreased edema  Patient benefiting from occupational therapy to increase range of motion needed for ADLs  Plan: Focus on digit and elbow range of motion and edema control   POC: 2021- 8/10/2021

## 2021-07-20 NOTE — PROGRESS NOTES
Assessment/Plan:  1  Closed fracture of left distal radius and ulna, initial encounter  XR wrist 2 vw left       Alissa is a pleasant 60year old female 6 weeks s/p ORIF dorsal spanning plate of left distal radius performed on 21  X-rays show maintained alignment and complete healing  Discussed hardware removal surgery to be performed in 7 weeks time as the patient is leaving for vacation  Surgical consent form was reviewed and discussed  Risk and benefits were discussed and the patient elected to proceed  We will see the patient post operatively  Subjective:   Ryees Marcum is a 60 y o  female who presents to the office for follow up evaluation 6 weeks s/p ORIF dorsal spanning plate left distal radius performed on 21  Patient states she is doing well without pain  Review of Systems   Constitutional: Negative for chills and fever  HENT: Negative for drooling and sneezing  Eyes: Negative for redness  Respiratory: Negative for cough and wheezing  Gastrointestinal: Negative for nausea and vomiting  Musculoskeletal: Negative for arthralgias, joint swelling and myalgias  Neurological: Negative for weakness and numbness  Psychiatric/Behavioral: Negative for behavioral problems  The patient is not nervous/anxious  Past Medical History:   Diagnosis Date    Anal fissure     Anxiety     Depression     History of snoring     Hypertension     PONV (postoperative nausea and vomiting)        Past Surgical History:   Procedure Laterality Date     SECTION      CHOLECYSTECTOMY      GASTRIC BYPASS      UT COLONOSCOPY FLX DX W/COLLJ SPEC WHEN PFRMD N/A 10/5/2018    Procedure: COLONOSCOPY;  Surgeon: Radha Brooks MD;  Location: [facility]; Service: Colorectal    UT OPEN RX DISTAL RADIUS FX, INTRA-ARTICULAR, 3+ FRAG Left 2021    Procedure: OPEN REDUCTION W/ INTERNAL FIXATION (ORIF) RADIUS / ULNA (WRIST);   Surgeon: Quirino Carlson DO;  Location: [address] [place] [address];  Service: Orthopedics    [place] REMOVAL ANAL FISTULA,SUBCUTANEOUS N/A 2/15/2019    Procedure: FISTULOTOMY, EUA, Fissurectomy, removal of external hemorrhoids;  Surgeon: Asher Adams MD;  [facility]: [facility];  Service: Colorectal    WISDOM TOOTH EXTRACTION         Family History   Problem Relation Age of Onset    Hypertension Mother     Hypertension Father     Other Father         Renal failure    Stomach cancer Father     Heart disease Father     No Known Problems Maternal Aunt     No Known Problems Maternal Aunt     No Known Problems Maternal Aunt     No Known Problems Maternal Aunt     No Known Problems Paternal Aunt     No Known Problems Paternal Aunt     No Known Problems Paternal Aunt        Social History     Occupational History    Not on file   Tobacco Use    Smoking status: Never Smoker    Smokeless tobacco: Never Used   Vaping Use    Vaping Use: Never used   Substance and Sexual Activity    Alcohol use: Yes     Comment: Socially    Drug use: No    Sexual activity: Not on file         Current Outpatient Medications:     calcium carbonate (TUMS) 500 mg chewable tablet, Chew 1 tablet daily, Disp: , Rfl:     calcium polycarbophil (FIBERCON) 625 mg tablet, Take 625 mg by mouth daily, Disp: , Rfl:     escitalopram (LEXAPRO) 10 mg tablet, Take 1 tablet (10 mg total) by mouth daily, Disp: 90 tablet, Rfl: 1    HYDROcodone-acetaminophen (NORCO) 5-325 mg per tablet, Take 1 tablet by mouth every 6 (six) hours as needed for painMax Daily Amount: 4 tablets, Disp: 15 tablet, Rfl: 0    metoprolol tartrate (LOPRESSOR) 25 mg tablet, TAKE 1 TABLET DAILY, Disp: 30 tablet, Rfl: 5    ibuprofen (MOTRIN) 800 mg tablet, Take 1 tablet (800 mg total) by mouth 3 (three) times a day (Patient not taking: Reported on 6/1/2021), Disp: 21 tablet, Rfl: 0    No Known Allergies    Objective:  Vitals:    07/20/21 1019   BP: 144/88   Pulse: 81       Left Hand Exam     Range of Motion   The patient has normal left wrist ROM     Other   Erythema: absent  Sensation: normal  Pulse: present    Comments:  Skin in tact   Incision is well healed without signs of infection          No tenderness about the fracture  Comp soft    Physical Exam  Constitutional:       Appearance: She is well-developed  HENT:      Head: Normocephalic and atraumatic  Eyes:      General:         Right eye: No discharge  Left eye: No discharge  Conjunctiva/sclera: Conjunctivae normal    Cardiovascular:      Rate and Rhythm: Normal rate  Pulmonary:      Effort: Pulmonary effort is normal  No respiratory distress  Musculoskeletal:      Cervical back: Normal range of motion and neck supple  Skin:     General: Skin is warm and dry  Neurological:      Mental Status: She is alert and oriented to person, place, and time  Psychiatric:         Behavior: Behavior normal          Thought Content: Thought content normal          Judgment: Judgment normal          I have personally reviewed pertinent films in PACS and my interpretation is as follows:  X-rays of the left wrist demonstrates complete healing of fracture with maintained alignment of hardware           Scribe Attestation    I,:  Natasha Mcallister MA am acting as a scribe while in the presence of the attending physician :       I,:  Nancyann Severance, DO personally performed the services described in this documentation    as scribed in my presence :

## 2021-07-21 ENCOUNTER — TELEPHONE (OUTPATIENT)
Dept: OBGYN CLINIC | Facility: CLINIC | Age: 56
End: 2021-07-21

## 2021-07-26 ENCOUNTER — TELEPHONE (OUTPATIENT)
Dept: OBGYN CLINIC | Facility: CLINIC | Age: 56
End: 2021-07-26

## 2021-07-26 NOTE — TELEPHONE ENCOUNTER
Patient called office stated she had dropped off paperwork for extended leave form, she is going to be having surgery on 9/1/21 , she stated that the paperwork that was filled out has her returning to work the week after the surgery    She just had a couple questions as to if that was right   She said she may not be able to answer her cell phone but you can leave a message

## 2021-07-27 ENCOUNTER — OFFICE VISIT (OUTPATIENT)
Dept: OCCUPATIONAL THERAPY | Facility: CLINIC | Age: 56
End: 2021-07-27
Payer: COMMERCIAL

## 2021-07-27 DIAGNOSIS — Z98.890 S/P ORIF (OPEN REDUCTION INTERNAL FIXATION) FRACTURE: Primary | ICD-10-CM

## 2021-07-27 DIAGNOSIS — Z87.81 S/P ORIF (OPEN REDUCTION INTERNAL FIXATION) FRACTURE: Primary | ICD-10-CM

## 2021-07-27 PROCEDURE — 97110 THERAPEUTIC EXERCISES: CPT

## 2021-07-27 PROCEDURE — 97530 THERAPEUTIC ACTIVITIES: CPT

## 2021-07-27 NOTE — PROGRESS NOTES
Daily Note     Today's date: 2021  Patient name: Tran Land  : 1965  MRN: [id number]  Referring provider: Maryann Lopes DO  Dx:   Encounter Diagnosis     ICD-10-CM    1  S/P ORIF (open reduction internal fixation) fracture  Z98 890     Z87 81        Start Time: 1010  Stop Time: 1050  Total time in clinic (min): 40 minutes    Subjective: Patient reported starting her new job yesterday with no difficulty  Objective: See treatment diary below       Precautions: spanning plate- no wrist rom    Manuals HEP 2021                        Ther Ex     Tendon glides x10    Digit ext/abd x10 x10   AROM thumb- all planes x10 x10   PROM digits  10 sec x3   AROM elbow over towel  x15   Resisted digit extension Red x10 Red x10        Ther Activity     Small pegs- alt digits  x20   Dice- in hand  x15   Iron balls  x15   Wall slides  x10   Towel scrunches  x10   Digi-flex  Yellow x10   Theraputty- , roll, pinch Yellow Yellow        Modalities     Moist heat  x5 min             Assessment: Patient tolerated session well  Session focused on digit range of motion, edema control, and gentle strengthening  Patient reported being compliant with home exercises  Patient tolerated increased resistance with strengthening exercises without complaints of pain  Provided home exercise program for gentle strengthening to be completed every other day  Patient benefiting from occupational therapy to increase ability to perform IADLs  Plan: Focus on digit and elbow range of motion, strengthening, and edema control   POC: 2021- 8/10/2021

## 2021-07-27 NOTE — TELEPHONE ENCOUNTER
Is Alissa being kept out until her surgery in September? If so I need an estimated rtw date so I can fix her paperwork     Thanks !

## 2021-07-29 ENCOUNTER — TELEPHONE (OUTPATIENT)
Dept: OBGYN CLINIC | Facility: CLINIC | Age: 56
End: 2021-07-29

## 2021-07-30 NOTE — TELEPHONE ENCOUNTER
I spoke to patient and made her aware that I will update her paperwork to 2 weeks after surgery on 9/1/2021

## 2021-08-03 ENCOUNTER — OFFICE VISIT (OUTPATIENT)
Dept: OCCUPATIONAL THERAPY | Facility: CLINIC | Age: 56
End: 2021-08-03
Payer: COMMERCIAL

## 2021-08-03 DIAGNOSIS — Z87.81 S/P ORIF (OPEN REDUCTION INTERNAL FIXATION) FRACTURE: Primary | ICD-10-CM

## 2021-08-03 DIAGNOSIS — Z98.890 S/P ORIF (OPEN REDUCTION INTERNAL FIXATION) FRACTURE: Primary | ICD-10-CM

## 2021-08-03 PROCEDURE — 97530 THERAPEUTIC ACTIVITIES: CPT

## 2021-08-03 PROCEDURE — 97110 THERAPEUTIC EXERCISES: CPT

## 2021-08-03 NOTE — PROGRESS NOTES
OT Re-Evaluation     Today's date: 8/3/2021  Patient name: Marlyn Mayorag  : 1965  MRN: [id number]  Referring provider: Junette Landau, DO  Dx:   Encounter Diagnosis     ICD-10-CM    1  S/P ORIF (open reduction internal fixation) fracture  Z98 890     Z87 81        Start Time: 5947  Stop Time: 1003  Total time in clinic (min): 49 minutes    Assessment  Assessment details: Patient reported that she was walking outside on 2021 and that she fell and put her arms out to brace herself  She reported going to the emergency room  Patient underwent ORIF dorsal spanning plate left radius on 2021  Patient is scheduled to have the plate removed on 4648  Patient presented with increased digit range of motion and decreased edema since last assessed  Strength assessed today  Patient presented with moderate deficits in  and pinch strength compared to non affected extremity     Impairments: abnormal or restricted ROM and impaired physical strength  Understanding of Dx/Px/POC: good   Prognosis: good    Goals  Short term goals:  Patient to demonstrate understanding of home exercise program in 2 weeks for decreased pain with activities of daily living -MET  Patient to increase composite digital flexion to touch distal jo crease in 4 weeks to aid in holding utensils -MET  Patient to decreased edema by 1 5 cm in wrist to enhance range of motion with grooming -PARTIALLY MET  Patient to report a decrease in pain by at least 1 point on a 0-10 scale in 2 weeks to aid in dressing -MET    Long term goals:  Patient to demonstrate functional active range of motion for independent ADL's by time of discharge  Patient to demonstrate functional strength for independent ADL's by time of discharge      Plan  Patient would benefit from: skilled occupational therapy  Planned modality interventions: thermotherapy: hydrocollator packs  Planned therapy interventions: joint mobilization, manual therapy, stretching, therapeutic activities, therapeutic exercise, flexibility, functional ROM exercises, home exercise program, massage and strengthening  Frequency: 1-2x/week  Duration in weeks: 8  Plan of Care beginning date: 8/3/2021  Plan of Care expiration date: 2021  Treatment plan discussed with: patient        Subjective Evaluation    History of Present Illness  Date of onset: 2021  Date of surgery: 2021  Mechanism of injury: surgery and trauma  Mechanism of injury: Patient reported that she is able to lift a cup, but that she has to either hold it from the bottom, or over the top  Patient reported that writing is becoming easier  Patient reported stiffness in digits  Patient is scheduled to have hardware removed on 2021  Quality of life: good    Pain  Current pain ratin  At best pain ratin  At worst pain ratin  Location: left wrist and forearm  Relieving factors: heat    Social Support  Lives with: spouse    Employment status: working (desk job- typing     Hobbies: coloring)  Hand dominance: left    Patient Goals  Patient goals for therapy: decreased edema, decreased pain and increased motion  Patient goal: To get back to normal        Objective     Active Range of Motion     Left Elbow   Normal active range of motion    Right Elbow   Normal active range of motion    Additional Active Range of Motion Details  Wrist range of motion not assessed   Left wrist has spanning plate across radius preventing wrist range of motion  Distance to distal palmar crease:  Left hand:  Thumb: 1 cm    Strength/Myotome Testing     Left Wrist/Hand      (2nd hand position)     Trial 1: 13    Thumb Strength  Palmar/Three-Point Pinch     Trial 1: 6    Right Wrist/Hand      (2nd hand position)     Trial 1: 50    Thumb Strength   Palmar/Three-Point Pinch     Trial 1: 15    Swelling     Left Wrist/Hand   Circumference MCP: 20 7 cm  Circumference wrist: 18 2 cm    Right Wrist/Hand   Circumference MCP: 20 3 cm  Circumference wrist: 16 4 cm             Precautions: spanning plate- no wrist rom    Manuals HEP 8/3/2021                        Ther Ex     Tendon glides x10    Digit ext/abd x10 x10   AROM thumb- all planes x10 x10   PROM digits  10 sec x3   AROM elbow over towel  x15   Resisted digit extension Red x10 Red x10        Ther Activity     Small pegs- alt digits  x20   Dice- in hand  x15   Iron balls  x15   Wall slides  x10   Towel scrunches  x10   Digi-flex  Yellow/Red x15   Theraputty- , roll, pinch Yellow Yellow   Ball twirls  1 1 lb x4        Modalities     Moist heat  x5 min             Assessment: Patient tolerated session well  Session focused on digit range of motion, edema control, and gentle strengthening  Patient reported being compliant with home exercises  Patient tolerated increased resistance with strengthening exercises without complaints of pain  Patient benefiting from occupational therapy to increase ability to perform IADLs  Plan: Focus on digit and elbow range of motion, strengthening, and edema control

## 2021-08-14 DIAGNOSIS — I10 BENIGN ESSENTIAL HYPERTENSION: ICD-10-CM

## 2021-08-18 ENCOUNTER — OFFICE VISIT (OUTPATIENT)
Dept: OCCUPATIONAL THERAPY | Facility: CLINIC | Age: 56
End: 2021-08-18
Payer: COMMERCIAL

## 2021-08-18 DIAGNOSIS — Z98.890 S/P ORIF (OPEN REDUCTION INTERNAL FIXATION) FRACTURE: Primary | ICD-10-CM

## 2021-08-18 DIAGNOSIS — Z87.81 S/P ORIF (OPEN REDUCTION INTERNAL FIXATION) FRACTURE: Primary | ICD-10-CM

## 2021-08-18 PROCEDURE — 97110 THERAPEUTIC EXERCISES: CPT

## 2021-08-18 PROCEDURE — 97530 THERAPEUTIC ACTIVITIES: CPT

## 2021-08-18 NOTE — PROGRESS NOTES
Daily Note     Today's date: 2021  Patient name: Apoorva De La Cruz  : 1965  MRN: [id number]  Referring provider: Brianna Chawla DO  Dx:   Encounter Diagnosis     ICD-10-CM    1  S/P ORIF (open reduction internal fixation) fracture  Z98 890     Z87 81        Start Time: 924  Stop Time: 1012  Total time in clinic (min): 48 minutes    Subjective: Patient reported that she was unable to squeeze a stapler with her left hand      Objective: See treatment diary below       Precautions: spanning plate- no wrist rom    Manuals HEP 2021                        Ther Ex     Tendon glides x10    Digit ext/abd x10 x10   AROM thumb- all planes x10 x10   PROM digits  x2 min   AROM elbow over towel     Resisted digit extension Red x10 Red x10   Gripper  Level 2 x15        Ther Activity     Small pegs- alt digits  x20   Dice- in hand  x15   Iron balls  x15   Wall slides  x10   Towel scrunches  x10   Digi-flex  Green x15   Theraputty- , roll, pinch Yellow Yellow   Ball twirls  1 1 lb x4        Modalities     Moist heat  x5 min             Assessment: Patient tolerated session well  Session focused on digit range of motion, edema control, and gentle strengthening  Patient reported being compliant with home exercises  Patient tolerated increased resistance with strengthening exercises without complaints of pain  She tolerated new strengthening exercise well  Patient benefiting from occupational therapy to increase ability to perform IADLs  Plan: Focus on digit and elbow range of motion, strengthening, and edema control

## 2021-08-24 ENCOUNTER — APPOINTMENT (OUTPATIENT)
Dept: RADIOLOGY | Facility: CLINIC | Age: 56
End: 2021-08-24
Payer: COMMERCIAL

## 2021-08-24 ENCOUNTER — OFFICE VISIT (OUTPATIENT)
Dept: OBGYN CLINIC | Facility: CLINIC | Age: 56
End: 2021-08-24

## 2021-08-24 VITALS — WEIGHT: 210 LBS | HEIGHT: 66 IN | BODY MASS INDEX: 33.75 KG/M2

## 2021-08-24 DIAGNOSIS — S52.602A CLOSED FRACTURE OF LEFT DISTAL RADIUS AND ULNA, INITIAL ENCOUNTER: ICD-10-CM

## 2021-08-24 DIAGNOSIS — S52.602A CLOSED FRACTURE OF LEFT DISTAL RADIUS AND ULNA, INITIAL ENCOUNTER: Primary | ICD-10-CM

## 2021-08-24 DIAGNOSIS — S52.502A CLOSED FRACTURE OF LEFT DISTAL RADIUS AND ULNA, INITIAL ENCOUNTER: Primary | ICD-10-CM

## 2021-08-24 DIAGNOSIS — S52.502A CLOSED FRACTURE OF LEFT DISTAL RADIUS AND ULNA, INITIAL ENCOUNTER: ICD-10-CM

## 2021-08-24 PROCEDURE — 73100 X-RAY EXAM OF WRIST: CPT

## 2021-08-24 PROCEDURE — 3008F BODY MASS INDEX DOCD: CPT | Performed by: ORTHOPAEDIC SURGERY

## 2021-08-24 PROCEDURE — 99024 POSTOP FOLLOW-UP VISIT: CPT | Performed by: ORTHOPAEDIC SURGERY

## 2021-08-24 NOTE — H&P (VIEW-ONLY)
Assessment/Plan:  1  Closed fracture of left distal radius and ulna, initial encounter  XR wrist 2 vw left       Patient doing well after  ORIF left distal radius fracture  She has no pain  She is not taking any pain medicine  She has been doing therapy for fingers which has been improving  She has no complications with the hardware at this point  X-rays demonstrate healed fracture  This point we discussed the treatment plan  Patient will be scheduled for hardware removal    We did discuss postoperative course and rehab  Patient understood this  Patient does understand that we will was taking the plate off after 3 months the patient left stiffness about the wrist   The did explain to her that it will take some time for the wrist recover  Patient understood this  All questions were answered  She will be scheduled for surgery  Subjective: postop    Patient ID: Samuel Haley is a 60 y o  female  HPI  Patient presents today approximately 3 months status post open reduction internal fixation left distal radius with dorsal spanning plate  She is doing well  She has no pain  She does notice some stiffness in the fingers  She is working with therapy and this is improved  She is here for an x-ray in to discuss removing the plate which would be next week at 3 months  Review of Systems   Constitutional: Negative for chills, fever and unexpected weight change  HENT: Negative for hearing loss, nosebleeds and sore throat  Eyes: Negative for pain, redness and visual disturbance  Respiratory: Negative for cough, shortness of breath and wheezing  Cardiovascular: Negative for chest pain, palpitations and leg swelling  Gastrointestinal: Negative for abdominal pain, nausea and vomiting  Endocrine: Negative for polydipsia and polyuria  Genitourinary: Negative for dysuria and hematuria  Musculoskeletal:        See HPI   Skin: Negative for rash and wound     Neurological: Negative for dizziness, numbness and headaches  Psychiatric/Behavioral: Negative for decreased concentration and suicidal ideas  The patient is not nervous/anxious  Past Medical History:   Diagnosis Date    Anal fissure     Anxiety     Depression     History of snoring     Hypertension     PONV (postoperative nausea and vomiting)        Past Surgical History:   Procedure Laterality Date     SECTION      CHOLECYSTECTOMY      GASTRIC BYPASS      AR COLONOSCOPY FLX DX W/COLLJ SPEC WHEN PFRMD N/A 10/5/2018    Procedure: COLONOSCOPY;  Surgeon: Jess Hassan MD;  [facility]; Service: Colorectal    AR OPEN RX DISTAL RADIUS FX, INTRA-ARTICULAR, 3+ FRAG Left 2021    Procedure: OPEN REDUCTION W/ INTERNAL FIXATION (ORIF) RADIUS / ULNA (WRIST);   Surgeon: Shira Hi DO;  Location: [facility];  Service: Orthopedics    AR REMOVAL ANAL [address] N/A 2/15/2019    Procedure: FISTULOTOMY, EUA, Fissurectomy, removal of external hemorrhoids;  Surgeon: Jess Hassan MD;  [facility]: [facility];  Service: Colorectal    WISDOM TOOTH EXTRACTION         Family History   Problem Relation Age of Onset   Lizy Betts Hypertension Mother     Hypertension Father     Other Father         Renal failure    Stomach cancer Father     Heart disease Father     No Known Problems Maternal Aunt     No Known Problems Maternal Aunt     No Known Problems Maternal Aunt     No Known Problems Maternal Aunt     No Known Problems Paternal Aunt     No Known Problems Paternal Aunt     No Known Problems Paternal Aunt        Social History     Occupational History    Not on file   Tobacco Use    Smoking status: Never Smoker    Smokeless tobacco: Never Used   Vaping Use    Vaping Use: Never used   Substance and Sexual Activity    Alcohol use: Yes     Comment: Socially    Drug use: No    Sexual activity: Not on file         Current Outpatient Medications:     calcium carbonate (TUMS) 500 mg chewable tablet, Chew 1 tablet daily, Disp: , Rfl:     calcium polycarbophil (FIBERCON) 625 mg tablet, Take 625 mg by mouth daily, Disp: , Rfl:     escitalopram (LEXAPRO) 10 mg tablet, Take 1 tablet (10 mg total) by mouth daily, Disp: 90 tablet, Rfl: 1    metoprolol tartrate (LOPRESSOR) 25 mg tablet, TAKE 1 TABLET DAILY, Disp: 30 tablet, Rfl: 5    HYDROcodone-acetaminophen (NORCO) 5-325 mg per tablet, Take 1 tablet by mouth every 6 (six) hours as needed for painMax Daily Amount: 4 tablets, Disp: 15 tablet, Rfl: 0    ibuprofen (MOTRIN) 800 mg tablet, Take 1 tablet (800 mg total) by mouth 3 (three) times a day (Patient not taking: Reported on 6/1/2021), Disp: 21 tablet, Rfl: 0    No Known Allergies    Objective:  Vitals: Body mass index is 33 89 kg/m²  Ortho Exam      Left wrist   Incision well healed   Compartment soft   Brisk cap refill  Completion can make a near full fist with some stiffness at the index MCP range of motion 0 to about 60   EPL FPL intact  FDS FDP extensors intact       Physical Exam  Vitals and nursing note reviewed  Constitutional:       Appearance: She is well-developed  HENT:      Head: Normocephalic and atraumatic  Eyes:      General: No scleral icterus  Conjunctiva/sclera: Conjunctivae normal    Cardiovascular:      Rate and Rhythm: Normal rate  Pulmonary:      Effort: Pulmonary effort is normal  No respiratory distress  Musculoskeletal:      Cervical back: Normal range of motion and neck supple  Comments: As noted in HPI   Skin:     General: Skin is warm and dry  Neurological:      Mental Status: She is alert and oriented to person, place, and time  Psychiatric:         Behavior: Behavior normal          I have personally reviewed pertinent films in PACS  X-rays obtained today multiple views of the left wrist demonstrate healed distal radius fracture and stable hardware  There are no hardware complications

## 2021-08-24 NOTE — PRE-PROCEDURE INSTRUCTIONS
My Surgical Experience    The following information was developed to assist you to prepare for your operation  What do I need to do before coming to the hospital?   Arrange for a responsible person to drive you to and from the hospital    Arrange care for your children at home  Children are not allowed in the recovery areas of the hospital   Plan to wear clothing that is easy to put on and take off  If you are having shoulder surgery, wear a shirt that buttons or zippers in the front  Bathing  o Shower the evening before and the morning of your surgery with an antibacterial soap  Please refer to the Pre Op Showering Instructions for Surgery Patients Sheet   o Remove nail polish and all body piercing jewelry  o Do not shave any body part for at least 24 hours before surgery-this includes face, arms, legs and upper body  Food  o Nothing to eat or drink after midnight the night before your surgery  This includes candy and chewing gum  o Exception: If your surgery is after 12:00pm (noon), you may have clear liquids such as 7-Up®, ginger ale, apple or cranberry juice, Jell-O®, water, or clear broth until 8:00 am  o Do not drink milk or juice with pulp on the morning before surgery  o Do not drink alcohol 24 hours before surgery  Medicine  o Follow instructions you received from your surgeon about which medicines you may take on the day of surgery  o If instructed to take medicine on the morning of surgery, take pills with just a small sip of water  Call your prescribing doctor for specific infroamtion on what to do if you take insulin    What should I bring to the hospital?    Bring:  Leory Brush or a walker, if you have them, for foot or knee surgery   A list of the daily medicines, vitamins, minerals, herbals and nutritional supplements you take   Include the dosages of medicines and the time you take them each day   Glasses, dentures or hearing aids   Minimal clothing; you will be wearing hospital sleepwear   Photo ID; required to verify your identity   If you have a Living Will or Power of , bring a copy of the documents   If you have an ostomy, bring an extra pouch and any supplies you use    Do not bring   Medicines or inhalers   Money, valuables or jewelry    What other information should I know about the day of surgery?  Notify your surgeons if you develop a cold, sore throat, cough, fever, rash or any other illness   Report to the Ambulatory Surgical/Same Day Surgery Unit   You will be instructed to stop at Registration only if you have not been pre-registered   Inform your  fi they do not stay that they will be asked by the staff to leave a phone number where they can be reached   Be available to be reached before surgery  In the event the operating room schedule changes, you may be asked to come in earlier or later than expected    *It is important to tell your doctor and others involved in your health care if you are taking or have been taking any non-prescription drugs, vitamins, minerals, herbals or other nutritional supplements  Any of these may interact with some food or medicines and cause a reaction      Pre-Surgery Instructions:   Medication Instructions    calcium carbonate (TUMS) 500 mg chewable tablet Instructed patient per Anesthesia Guidelines   calcium polycarbophil (FIBERCON) 625 mg tablet Instructed patient per Anesthesia Guidelines   escitalopram (LEXAPRO) 10 mg tablet Instructed patient per Anesthesia Guidelines   metoprolol tartrate (LOPRESSOR) 25 mg tablet Instructed patient per Anesthesia Guidelines  To take metoprolol and escitalopram a m  of surgery

## 2021-08-24 NOTE — PROGRESS NOTES
Assessment/Plan:  1  Closed fracture of left distal radius and ulna, initial encounter  XR wrist 2 vw left       Patient doing well after  ORIF left distal radius fracture  She has no pain  She is not taking any pain medicine  She has been doing therapy for fingers which has been improving  She has no complications with the hardware at this point  X-rays demonstrate healed fracture  This point we discussed the treatment plan  Patient will be scheduled for hardware removal    We did discuss postoperative course and rehab  Patient understood this  Patient does understand that we will was taking the plate off after 3 months the patient left stiffness about the wrist   The did explain to her that it will take some time for the wrist recover  Patient understood this  All questions were answered  She will be scheduled for surgery  Subjective: postop    Patient ID: Slava Ramirez is a 60 y o  female  HPI  Patient presents today approximately 3 months status post open reduction internal fixation left distal radius with dorsal spanning plate  She is doing well  She has no pain  She does notice some stiffness in the fingers  She is working with therapy and this is improved  She is here for an x-ray in to discuss removing the plate which would be next week at 3 months  Review of Systems   Constitutional: Negative for chills, fever and unexpected weight change  HENT: Negative for hearing loss, nosebleeds and sore throat  Eyes: Negative for pain, redness and visual disturbance  Respiratory: Negative for cough, shortness of breath and wheezing  Cardiovascular: Negative for chest pain, palpitations and leg swelling  Gastrointestinal: Negative for abdominal pain, nausea and vomiting  Endocrine: Negative for polydipsia and polyuria  Genitourinary: Negative for dysuria and hematuria  Musculoskeletal:        See HPI   Skin: Negative for rash and wound     Neurological: Negative for dizziness, numbness and headaches  Psychiatric/Behavioral: Negative for decreased concentration and suicidal ideas  The patient is not nervous/anxious  Past Medical History:   Diagnosis Date    Anal fissure     Anxiety     Depression     History of snoring     Hypertension     PONV (postoperative nausea and vomiting)        Past Surgical History:   Procedure Laterality Date     SECTION      CHOLECYSTECTOMY      GASTRIC BYPASS      VT COLONOSCOPY FLX DX W/COLLJ SPEC WHEN PFRMD N/A 10/5/2018    Procedure: COLONOSCOPY;  Surgeon: Ac Reis MD;  [facility]; Service: Colorectal    VT OPEN RX DISTAL RADIUS FX, INTRA-ARTICULAR, 3+ FRAG Left 2021    Procedure: OPEN REDUCTION W/ INTERNAL FIXATION (ORIF) RADIUS / ULNA (WRIST);   Surgeon: Marlon Ovalle DO;  [facility];  Service: Orthopedics    VT REMOVAL ANAL [address] N/A 2/15/2019    Procedure: FISTULOTOMY, EUA, Fissurectomy, removal of external hemorrhoids;  Surgeon: Ac Reis MD;  [facility];  Service: Colorectal    WISDOM TOOTH EXTRACTION         Family History   Problem Relation Age of Onset   Mollie Sizer Hypertension Mother     Hypertension Father     Other Father         Renal failure    Stomach cancer Father     Heart disease Father     No Known Problems Maternal Aunt     No Known Problems Maternal Aunt     No Known Problems Maternal Aunt     No Known Problems Maternal Aunt     No Known Problems Paternal Aunt     No Known Problems Paternal Aunt     No Known Problems Paternal Aunt        Social History     Occupational History    Not on file   Tobacco Use    Smoking status: Never Smoker    Smokeless tobacco: Never Used   Vaping Use    Vaping Use: Never used   Substance and Sexual Activity    Alcohol use: Yes     Comment: Socially    Drug use: No    Sexual activity: Not on file         Current Outpatient Medications:     calcium carbonate (TUMS) 500 mg chewable tablet, Chew 1 tablet daily, Disp: , Rfl:     calcium polycarbophil (FIBERCON) 625 mg tablet, Take 625 mg by mouth daily, Disp: , Rfl:     escitalopram (LEXAPRO) 10 mg tablet, Take 1 tablet (10 mg total) by mouth daily, Disp: 90 tablet, Rfl: 1    metoprolol tartrate (LOPRESSOR) 25 mg tablet, TAKE 1 TABLET DAILY, Disp: 30 tablet, Rfl: 5    HYDROcodone-acetaminophen (NORCO) 5-325 mg per tablet, Take 1 tablet by mouth every 6 (six) hours as needed for painMax Daily Amount: 4 tablets, Disp: 15 tablet, Rfl: 0    ibuprofen (MOTRIN) 800 mg tablet, Take 1 tablet (800 mg total) by mouth 3 (three) times a day (Patient not taking: Reported on 6/1/2021), Disp: 21 tablet, Rfl: 0    No Known Allergies    Objective:  Vitals: Body mass index is 33 89 kg/m²  Ortho Exam      Left wrist   Incision well healed   Compartment soft   Brisk cap refill  Completion can make a near full fist with some stiffness at the index MCP range of motion 0 to about 60   EPL FPL intact  FDS FDP extensors intact       Physical Exam  Vitals and nursing note reviewed  Constitutional:       Appearance: She is well-developed  HENT:      Head: Normocephalic and atraumatic  Eyes:      General: No scleral icterus  Conjunctiva/sclera: Conjunctivae normal    Cardiovascular:      Rate and Rhythm: Normal rate  Pulmonary:      Effort: Pulmonary effort is normal  No respiratory distress  Musculoskeletal:      Cervical back: Normal range of motion and neck supple  Comments: As noted in HPI   Skin:     General: Skin is warm and dry  Neurological:      Mental Status: She is alert and oriented to person, place, and time  Psychiatric:         Behavior: Behavior normal          I have personally reviewed pertinent films in PACS  X-rays obtained today multiple views of the left wrist demonstrate healed distal radius fracture and stable hardware  There are no hardware complications

## 2021-08-25 ENCOUNTER — OFFICE VISIT (OUTPATIENT)
Dept: OCCUPATIONAL THERAPY | Facility: CLINIC | Age: 56
End: 2021-08-25
Payer: COMMERCIAL

## 2021-08-25 DIAGNOSIS — Z98.890 S/P ORIF (OPEN REDUCTION INTERNAL FIXATION) FRACTURE: Primary | ICD-10-CM

## 2021-08-25 DIAGNOSIS — Z87.81 S/P ORIF (OPEN REDUCTION INTERNAL FIXATION) FRACTURE: Primary | ICD-10-CM

## 2021-08-25 PROCEDURE — 97110 THERAPEUTIC EXERCISES: CPT

## 2021-08-25 PROCEDURE — 97530 THERAPEUTIC ACTIVITIES: CPT

## 2021-08-25 NOTE — PROGRESS NOTES
Daily Note     Today's date: 2021  Patient name: Luma Rivers  : 1965  MRN: [id number]  Referring provider: Shane Reyes DO  Dx:   Encounter Diagnosis     ICD-10-CM    1  S/P ORIF (open reduction internal fixation) fracture  Z98 [id number]     Z87 81        Start Time: 1225  Stop Time: 1259  Total time in clinic (min): 34 minutes    Subjective: Patient reported that she was able to move a couple bins the other day  Objective: See treatment diary below       Precautions: spanning plate- no wrist rom    Manuals HEP 2021                        Ther Ex     Tendon glides x10    Digit ext/abd x10 x10   AROM thumb- all planes x10 x10   PROM digits  x2 min   AROM elbow over towel     Resisted digit extension Red x10 Red x10   Gripper  Level 2 x20        Ther Activity     Small key pegs- alt digits  x20   Dice- in hand  x15   Iron balls  x15   Wall slides  x10   Towel scrunches  x10   Digi-flex  Green x15   Theraputty- , roll, pinch Yellow Yellow   Ball twirls  1 1 lb x4        Modalities     Moist heat  x5 min             Assessment: Patient tolerated session well  Session focused on digit range of motion, edema control, and gentle strengthening  Patient reported being compliant with home exercises  Patient scheduled to have removal of hardware surgery on 2021  Patient benefiting from occupational therapy to increase ability to perform IADLs  Plan: Focus on digit and elbow range of motion, strengthening, and edema control   POC: 8/3/2021 - 2021

## 2021-08-31 ENCOUNTER — APPOINTMENT (OUTPATIENT)
Dept: OCCUPATIONAL THERAPY | Facility: CLINIC | Age: 56
End: 2021-08-31
Payer: COMMERCIAL

## 2021-08-31 ENCOUNTER — ANESTHESIA EVENT (OUTPATIENT)
Dept: PERIOP | Facility: HOSPITAL | Age: 56
End: 2021-08-31
Payer: COMMERCIAL

## 2021-08-31 NOTE — ANESTHESIA PREPROCEDURE EVALUATION
Procedure:  REMOVAL HARDWARE RADIUS / [place] (WRIST) (Left Wrist)  TENOSYNOVECTOMY (Left Wrist)    Relevant Problems   ANESTHESIA   (+) PONV (postoperative nausea and vomiting)      CARDIO   (+) Benign essential hypertension   (+) Mixed hyperlipidemia      GI/HEPATIC   (+) Fecal impaction (HCC)   (+) H/O bariatric surgery - bypass      NEURO/PSYCH   (+) Anxiety disorder   (+) Depression      PULMONARY   (+) Obstructive sleep apnea        Physical Exam    Airway    Mallampati score: II  TM Distance: >3 FB  Neck ROM: full     Dental   No notable dental hx     Cardiovascular  Rhythm: regular, Rate: normal,     Pulmonary  Breath sounds clear to auscultation,     Other Findings        Anesthesia Plan  ASA Score- 2     Anesthesia Type- IV sedation with anesthesia with ASA Monitors           Additional Monitors:   Airway Plan:           Plan Factors-    Induction-     Postoperative Plan-     Informed Consent-

## 2021-09-01 ENCOUNTER — ANESTHESIA (OUTPATIENT)
Dept: PERIOP | Facility: HOSPITAL | Age: 56
End: 2021-09-01
Payer: COMMERCIAL

## 2021-09-01 ENCOUNTER — HOSPITAL ENCOUNTER (OUTPATIENT)
Facility: HOSPITAL | Age: 56
Setting detail: OUTPATIENT SURGERY
Discharge: HOME | End: 2021-09-01
Attending: ORTHOPAEDIC SURGERY | Admitting: ORTHOPAEDIC SURGERY
Payer: COMMERCIAL

## 2021-09-01 VITALS
TEMPERATURE: 97 F | WEIGHT: 204.6 LBS | HEIGHT: 66 IN | BODY MASS INDEX: 32.88 KG/M2 | DIASTOLIC BLOOD PRESSURE: 70 MMHG | RESPIRATION RATE: 16 BRPM | HEART RATE: 57 BPM | OXYGEN SATURATION: 100 % | SYSTOLIC BLOOD PRESSURE: 149 MMHG

## 2021-09-01 DIAGNOSIS — S52.602A CLOSED FRACTURE OF LEFT DISTAL RADIUS AND ULNA, INITIAL ENCOUNTER: Primary | ICD-10-CM

## 2021-09-01 DIAGNOSIS — S52.502A CLOSED FRACTURE OF LEFT DISTAL RADIUS AND ULNA, INITIAL ENCOUNTER: Primary | ICD-10-CM

## 2021-09-01 PROCEDURE — 73090 X-RAY EXAM OF FOREARM: CPT | Performed by: ORTHOPAEDIC SURGERY

## 2021-09-01 PROCEDURE — 20680 REMOVAL OF IMPLANT DEEP: CPT | Performed by: ORTHOPAEDIC SURGERY

## 2021-09-01 PROCEDURE — 25116 REMOVE WRIST/FOREARM LESION: CPT | Performed by: ORTHOPAEDIC SURGERY

## 2021-09-01 RX ORDER — CEFAZOLIN SODIUM 2 G/50ML
2000 SOLUTION INTRAVENOUS ONCE
Status: DISCONTINUED | OUTPATIENT
Start: 2021-09-01 | End: 2021-09-01 | Stop reason: HOSPADM

## 2021-09-01 RX ORDER — PROPOFOL 10 MG/ML
INJECTION, EMULSION INTRAVENOUS CONTINUOUS PRN
Status: DISCONTINUED | OUTPATIENT
Start: 2021-09-01 | End: 2021-09-01

## 2021-09-01 RX ORDER — MIDAZOLAM HYDROCHLORIDE 2 MG/2ML
INJECTION, SOLUTION INTRAMUSCULAR; INTRAVENOUS AS NEEDED
Status: DISCONTINUED | OUTPATIENT
Start: 2021-09-01 | End: 2021-09-01

## 2021-09-01 RX ORDER — HYDROCODONE BITARTRATE AND ACETAMINOPHEN 5; 325 MG/1; MG/1
1 TABLET ORAL EVERY 6 HOURS PRN
Qty: 15 TABLET | Refills: 0 | Status: SHIPPED | OUTPATIENT
Start: 2021-09-01 | End: 2021-09-11

## 2021-09-01 RX ORDER — SODIUM CHLORIDE, SODIUM LACTATE, POTASSIUM CHLORIDE, CALCIUM CHLORIDE 600; 310; 30; 20 MG/100ML; MG/100ML; MG/100ML; MG/100ML
125 INJECTION, SOLUTION INTRAVENOUS CONTINUOUS
Status: DISCONTINUED | OUTPATIENT
Start: 2021-09-01 | End: 2021-09-01 | Stop reason: HOSPADM

## 2021-09-01 RX ORDER — KETAMINE HCL IN NACL, ISO-OSM 100MG/10ML
SYRINGE (ML) INJECTION AS NEEDED
Status: DISCONTINUED | OUTPATIENT
Start: 2021-09-01 | End: 2021-09-01

## 2021-09-01 RX ORDER — FENTANYL CITRATE 50 UG/ML
INJECTION, SOLUTION INTRAMUSCULAR; INTRAVENOUS AS NEEDED
Status: DISCONTINUED | OUTPATIENT
Start: 2021-09-01 | End: 2021-09-01

## 2021-09-01 RX ORDER — CEFAZOLIN SODIUM 2 G/50ML
2000 SOLUTION INTRAVENOUS ONCE
Status: COMPLETED | OUTPATIENT
Start: 2021-09-01 | End: 2021-09-01

## 2021-09-01 RX ORDER — CEFAZOLIN SODIUM 2 G/50ML
SOLUTION INTRAVENOUS AS NEEDED
Status: DISCONTINUED | OUTPATIENT
Start: 2021-09-01 | End: 2021-09-01

## 2021-09-01 RX ORDER — PROPOFOL 10 MG/ML
INJECTION, EMULSION INTRAVENOUS AS NEEDED
Status: DISCONTINUED | OUTPATIENT
Start: 2021-09-01 | End: 2021-09-01

## 2021-09-01 RX ORDER — MAGNESIUM HYDROXIDE 1200 MG/15ML
LIQUID ORAL AS NEEDED
Status: DISCONTINUED | OUTPATIENT
Start: 2021-09-01 | End: 2021-09-01 | Stop reason: HOSPADM

## 2021-09-01 RX ADMIN — PROPOFOL 100 MCG/KG/MIN: 10 INJECTION, EMULSION INTRAVENOUS at 09:07

## 2021-09-01 RX ADMIN — PROPOFOL 50 MG: 10 INJECTION, EMULSION INTRAVENOUS at 09:07

## 2021-09-01 RX ADMIN — Medication 10 MG: at 09:18

## 2021-09-01 RX ADMIN — CEFAZOLIN SODIUM 2000 MG: 2 SOLUTION INTRAVENOUS at 09:01

## 2021-09-01 RX ADMIN — FENTANYL CITRATE 50 MCG: 50 INJECTION, SOLUTION INTRAMUSCULAR; INTRAVENOUS at 09:06

## 2021-09-01 RX ADMIN — Medication 10 MG: at 09:15

## 2021-09-01 RX ADMIN — FENTANYL CITRATE 50 MCG: 50 INJECTION, SOLUTION INTRAMUSCULAR; INTRAVENOUS at 09:10

## 2021-09-01 RX ADMIN — SODIUM CHLORIDE, SODIUM LACTATE, POTASSIUM CHLORIDE, AND CALCIUM CHLORIDE: .6; .31; .03; .02 INJECTION, SOLUTION INTRAVENOUS at 09:01

## 2021-09-01 RX ADMIN — MIDAZOLAM 2 MG: 1 INJECTION INTRAMUSCULAR; INTRAVENOUS at 09:01

## 2021-09-01 RX ADMIN — Medication 30 MG: at 09:06

## 2021-09-01 RX ADMIN — CEFAZOLIN SODIUM 2000 MG: 2 SOLUTION INTRAVENOUS at 08:55

## 2021-09-01 NOTE — PERIOPERATIVE NURSING NOTE
Patient is alert and awake, VSS, denies pain, tolerating PO fluids, denies discomfort, neurovascular assessment WNL in LUE, call bell placed in reach, will continue to monitor patient  Shara Wright

## 2021-09-01 NOTE — PERIOPERATIVE NURSING NOTE
Pt d/c to home at this time w/husbnad  Via wheelchair  Pt left with all belongings  Iv was D/C intact with dry sterile dressing  Surgical dressing clean dry and intact on LUE  Encouraged to keep follow up appointments, Verbalized understanding  D/C instructions reviewed and explained  Verbalized understanding  New Rx explained  Verbalized understanding

## 2021-09-01 NOTE — DISCHARGE INSTRUCTIONS
Dr Gaby Andrews Operative Instructions  Removal of Hardware Left Wrist    DIET:   Resume prior diet  Start light and progress as tolerated  No alcoholic beverages on the day of surgery  DRESSING/ WOUND:   1  Bandage: Do NOT remove bandage until follow-up appointment  Shara Wright Keep your dressing clean and DRY  2  All patients may shower tomorrow  Cover your bandage with a plastic bag and use tape or a rubber band so that it is water tight  A small towel under the rubber band will help catch any water that leaks through  3  If a splint has been applied --do not make holes in it, remove it, or stick objects in it (i e  coat hangers, pencils)  4  Use a pillow as much as possible to protect the operative site as well as keep it elevated   5  Ice: Ice for 10 minutes every hour as needed for swelling x 24 hours  ACTIVITY:   1  Keep hand/wrist above the level of your heart at all times for the next 7 days  This should be accomplished by using a pillow  A sling will not hold your hand/wrist above your heart and therefore is inadequate (it also may cause shoulder and elbow stiffness)  2  Motion: Move fingers into a fist 5 times a day, DO NOT move any splinted fingers  3  Avoid activities which may re-injure your hand or finger  4  Move all joints of the extremity that are not immobilized (i e  shoulder, elbow, fingers, and thumb unless instructed otherwise) to prevent stiffness  5  Sling: No sling necessary  6  Weight bearing status: Avoid heavy lifting (>5 pounds) with the extremity that was operated on until follow up appointment  Normal activities of daily living are OK  PAIN MEDICINE:   1  Norco/Hydrocodone one tab every 6 hours AS NEEDED for pain  Take pain medicine on an AS NEEDED basis according to your doctor's instructions   If you had a nerve block and still have no pain before going to bed, great, but still take a pain pill so it does not wake you up in the middle of the night (this is what usually will happen)  2  Your pain will decrease over the next few days-- allowing you to:    Decrease your pain medicine quantity until you stop   Increase the time between doses until you stop  3  You should not drink alcoholic beverages or operate heavy machinery while on pain medication  4  Take pain medicine with food to prevent nausea  CONTACT PHYSICIAN FOR:   Slight pain, swelling and bluish discoloration are to be expected  If you have breathing difficulty or chest pain dial 911 immediately  However, if the following symptoms occur notify your physician:    Temperature above 101° F  Inability to urinate in 8 hours    Uncontrolled nausea/vomiting  Progressively increasing pain    Signs of wound infection (redness, swelling, snot/pus-like drainage)  Excessive bright red bleeding on dressing   Excessive swelling and tightness  Increasing numbness        Follow-up Appointment: 10-14 days  Please call the office at [phone] if you have any questions or concerns regarding your post-operative care

## 2021-09-01 NOTE — OP NOTE
OPERATIVE REPORT  PATIENT NAME: Eliud Lopes    :  1965  MRN: [id number]  Pt Location: [facility]OM 03    SURGERY DATE: 2021    Surgeon(s) and Role:     * DO Fernando Benavides Primary    Preop Diagnosis:  Closed fracture of distal ends of left radius and ulna with routine healing, subsequent encounter [S52 502D, S52 602D]    Post-Op Diagnosis Codes:     * Closed fracture of distal ends of left radius and ulna with routine healing, subsequent encounter [S52 502D, S52 602D]    Procedure(s) (LRB):  REMOVAL HARDWARE RADIUS / ULNA (WRIST) (Left)  TENOSYNOVECTOMY (Left)    Specimen(s):  * No specimens in log *    Estimated Blood Loss:   Minimal    Drains:  * No LDAs found *    Anesthesia Type:   IV Sedation with Anesthesia    Operative Indications:  Closed fracture of distal ends of left radius and ulna with routine healing, subsequent encounter [S52 502D, S52 602D]      Operative Findings:  Healed DR fx  Plate removed without complications    Complications:   None    Tourniquet time:  18 minutes     IV antibiotics:   Ancef    Procedure and Technique:   patient is a 57-year-old female who underwent ORIF of her left distal radius approximately 3 months ago  I did follow her over the past 3 months with serial x-rays and serial exams  Patient also did physical therapy  She is progressing well  On her last visit x-rays demonstrated a healed fracture  I did explain to the patient at the 3 month marcy we would remove the plate to allow therapy to start  Surgical options were discussed  Risks were explained of the procedure including fracture, bleeding, nerve or vessel damage, need for future surgery  Patient understood these  Patient elected to undergo removal of the plate in the operating room  She understood the risks  All questions were answered  Consent forms were obtained  On the day of the surgery patient was identified by her 1st and last name  The left upper extremity was marked    Patient met with the anesthesia team they deemed that local plus sedation was most appropriate  Patient consented to this  Patient was transferred from the preop area the OR and underwent sedation without complication  Time-out was performed successfully  Everybody in the room was in agreement  I reviewed the consent form myself  Antibiotics had been given  Under sterile technique a 50 50 mix of 1% plain lidocaine and 0 5% plain Marcaine was infiltrated in the area the prior incisions where the new incisions would be  This was done under sterile technique  Patient tolerated this well  Well-padded tourniquet was placed on the arm  Patient underwent sterile prep and drape  The limb was exsanguinated with an Esmarch and tourniquet was elevated 250 mmHg  Attention was 1st turned to the prior incision over the 2nd metacarpal   Using a 15 blade incision was made over this scar  Careful dissection was performed through skin and subcutaneous tissue  Neurovascular structures were retracted with a dull Heiss retractor  Extensor tendons were also retracted with dull Heiss and Ragnell retractors  We immediately encountered the plate  Three cortical screws were removed without complication  No fractures were noted  We then turned our attention proximally to the prior incision over the forearm  A 15 blade was used to make an incision over this scar  Careful dissection was performed through skin and subcutaneous tissue  We encountered a branch of the superficial radial nerve and this was retracted with a dull retractor  We then encountered the 2nd dorsal compartment  These tendons were noted to have significant he synovitis  Tenosynovectomy was performed of these tendons  We then were able to free the tendons and retract them ulnarly  At this point we identified the plate  Three cortical screws were removed out of the plate in this region    X-rays were then obtained which demonstrated complete removal of all the screws  Using a [place] elevator the plate was elevated off of the radius  Any adhesions from scar her bony adhesions were released  The plate was removed atraumatically from the arm  This was removed from the distal incision  Repeat x-rays were obtained which demonstrated no fractures around the screw holes  The distal radius was healed in good alignment  After  We noted a healed distal radius with no complications with hardware removal, the tourniquet was released  All bleeding was stopped with bipolar cautery or direct pressure  The woundsa were thoroughly irrigated with normal saline solution  Patient tolerated the surgery well  The wounds were closed with 4-0 Vicryl and 5 0 running Monocryl  Patient was placed in a sterile dressing and a volar splint which was appropriately padded  She tolerated the surgery well  Patient was awoken from anesthesia and transferred from the OR to PACU in stable condition       I was present for the entire procedure    Patient Disposition:  PACU  and hemodynamically stable    SIGNATURE: Ren Dsouza DO  DATE: September 1, 2021  TIME: 4:56 PM

## 2021-09-01 NOTE — INTERVAL H&P NOTE
H&P reviewed  After examining the patient I find no changes in the patients condition since the H&P had been written      Vitals:    09/01/21 0717   BP: 135/71   Pulse: 71   Resp: 18   Temp: 98 2 °F (36 8 °C)   SpO2: 95%

## 2021-09-07 DIAGNOSIS — I10 BENIGN ESSENTIAL HYPERTENSION: ICD-10-CM

## 2021-09-13 NOTE — PROGRESS NOTES
Assessment/Plan:    There are no diagnoses linked to this encounter  BMI Counseling: There is no height or weight on file to calculate BMI  The BMI {VB BMI Counselin}  There are no Patient Instructions on file for this visit  No follow-ups on file  Subjective:      Patient ID: Pedro Pablo Guerrero is a 60 y o  female  No chief complaint on file  HPI    The following portions of the patient's history were reviewed and updated as appropriate: allergies, current medications, past family history, past medical history, past social history, past surgical history and problem list     Review of Systems   Constitutional: Negative  Respiratory: Negative  Cardiovascular: Negative  Current Outpatient Medications   Medication Sig Dispense Refill    calcium carbonate (TUMS) 500 mg chewable tablet Chew 1 tablet daily      calcium polycarbophil (FIBERCON) 625 mg tablet Take 625 mg by mouth daily      escitalopram (LEXAPRO) 10 mg tablet Take 1 tablet (10 mg total) by mouth daily 90 tablet 1    metoprolol tartrate (LOPRESSOR) 25 mg tablet Take 1 tablet (25 mg total) by mouth daily 30 tablet 5     No current facility-administered medications for this visit  Objective: There were no vitals taken for this visit         Physical Exam           Ria Elaine MD

## 2021-09-14 ENCOUNTER — RA CDI HCC (OUTPATIENT)
Dept: OTHER | Facility: HOSPITAL | Age: 56
End: 2021-09-14

## 2021-09-14 NOTE — PROGRESS NOTES
[facility]  coding opportunities       Chart reviewed, no opportunity found: CHART REVIEWED, NO OPPORTUNITY FOUND                        Patients insurance company: [id number] (Medicare and Commercial for [employer] and [employer])

## 2021-09-16 ENCOUNTER — OFFICE VISIT (OUTPATIENT)
Dept: OBGYN CLINIC | Facility: CLINIC | Age: 56
End: 2021-09-16

## 2021-09-16 ENCOUNTER — VBI (OUTPATIENT)
Dept: ADMINISTRATIVE | Facility: OTHER | Age: 56
End: 2021-09-16

## 2021-09-16 ENCOUNTER — APPOINTMENT (OUTPATIENT)
Dept: RADIOLOGY | Facility: CLINIC | Age: 56
End: 2021-09-16
Payer: COMMERCIAL

## 2021-09-16 DIAGNOSIS — S52.602A CLOSED FRACTURE OF LEFT DISTAL RADIUS AND ULNA, INITIAL ENCOUNTER: ICD-10-CM

## 2021-09-16 DIAGNOSIS — S52.502A CLOSED FRACTURE OF LEFT DISTAL RADIUS AND ULNA, INITIAL ENCOUNTER: ICD-10-CM

## 2021-09-16 DIAGNOSIS — Z87.81 S/P ORIF (OPEN REDUCTION INTERNAL FIXATION) FRACTURE: Primary | ICD-10-CM

## 2021-09-16 DIAGNOSIS — Z98.890 S/P ORIF (OPEN REDUCTION INTERNAL FIXATION) FRACTURE: Primary | ICD-10-CM

## 2021-09-16 PROCEDURE — 99024 POSTOP FOLLOW-UP VISIT: CPT | Performed by: ORTHOPAEDIC SURGERY

## 2021-09-16 PROCEDURE — 73100 X-RAY EXAM OF WRIST: CPT

## 2021-09-16 NOTE — PROGRESS NOTES
Assessment/Plan:  1  S/P ORIF (open reduction internal fixation) fracture     2  Closed fracture of left distal radius and ulna, initial encounter  XR wrist 2 vw left       I personally saw and examined the patient  She is doing well  We will start therapy at this time  Patient follow-up in 4 weeks for repeat evaluation  Patient has no pain about the wrist   She has good range of motion of the fingers  She has no sensory motor deficits  She understand that she the spanning plate on for 3 months and will take some time to get her motion back  I did discuss there should can begin activity without restriction  She will slowly wean back into this  She does not need the brace  All questions were answered  I will see the patient back in 4 weeks  She is very happy with her care thus far  Subjective: post op    Patient ID: Marlyn Mayorga is a 60 y o  female  HPI    Patient is 2 weeks status post hardware removal for spanning plate for distal radius fracture  She is doing well  She has known fever chills  She has no pain about the wrist   She has been compliant with the brace  Review of Systems   Constitutional: Negative for chills, fever and unexpected weight change  HENT: Negative for hearing loss, nosebleeds and sore throat  Eyes: Negative for pain, redness and visual disturbance  Respiratory: Negative for cough, shortness of breath and wheezing  Cardiovascular: Negative for chest pain, palpitations and leg swelling  Gastrointestinal: Negative for abdominal pain, nausea and vomiting  Endocrine: Negative for polydipsia and polyuria  Genitourinary: Negative for dysuria and hematuria  Musculoskeletal:        See HPI   Skin: Negative for rash and wound  Neurological: Negative for dizziness, numbness and headaches  Psychiatric/Behavioral: Negative for decreased concentration and suicidal ideas  The patient is not nervous/anxious            Past Medical History:   Diagnosis Date  Anal fissure     Anxiety     Depression     History of snoring     Hypertension     PONV (postoperative nausea and vomiting)        Past Surgical History:   Procedure Laterality Date     SECTION      CHOLECYSTECTOMY      GASTRIC BYPASS      NE COLONOSCOPY FLX DX W/COLLJ SPEC WHEN PFRMD N/A 10/5/2018    Procedure: COLONOSCOPY;  Surgeon: Tobin Lawrence MD;  [facility]; Service: Colorectal    NE EXCIS SYNOV WRIST,EXTENS TENDON Left 2021    Procedure: TENOSYNOVECTOMY;  Surgeon: Petty Kwan DO;  Location: [address] [place] [address];  Service: Orthopedics    NE OPEN RX DISTAL RADIUS FX, INTRA-ARTICULAR, 3+ FRAG Left 2021    Procedure: OPEN REDUCTION W/ INTERNAL FIXATION (ORIF) RADIUS / ULNA (WRIST); Surgeon: Petty Kwan DO;  Location: [address] [place] [address];  Service: Orthopedics    NE REMOVAL ANAL Starlene Exon N/A 2/15/2019    Procedure: FISTULOTOMY, EUA, Fissurectomy, removal of external hemorrhoids;  Surgeon: Tobin Lawrence MD;  [facility];  Service: Colorectal    NE REMOVAL DEEP IMPLANT Left 2021    Procedure: REMOVAL HARDWARE RADIUS / Giulia Racer (WRIST); Surgeon: Petty Kwan DO;  Location: [facility];  Service: Orthopedics    WISDOM TOOTH EXTRACTION         Family History   Problem Relation Age of Onset    Hypertension Mother     Hypertension Father     Other Father         Renal failure    Stomach cancer Father     Heart disease Father     No Known Problems Maternal Aunt     No Known Problems Maternal Aunt     No Known Problems Maternal Aunt     No Known Problems Maternal Aunt     No Known Problems Paternal Aunt     No Known Problems Paternal Aunt     No Known Problems Paternal Aunt        Social History     Occupational History    Not on file   Tobacco Use    Smoking status: Never Smoker    Smokeless tobacco: Never Used   Vaping Use    Vaping Use: Never used   Substance and Sexual Activity    Alcohol use: Yes     Comment: Socially    Drug use:  No  Sexual activity: Not on file         Current Outpatient Medications:     calcium carbonate (TUMS) 500 mg chewable tablet, Chew 1 tablet daily, Disp: , Rfl:     calcium polycarbophil (FIBERCON) 625 mg tablet, Take 625 mg by mouth daily, Disp: , Rfl:     escitalopram (LEXAPRO) 10 mg tablet, Take 1 tablet (10 mg total) by mouth daily, Disp: 90 tablet, Rfl: 1    metoprolol tartrate (LOPRESSOR) 25 mg tablet, Take 1 tablet (25 mg total) by mouth daily, Disp: 30 tablet, Rfl: 5    No Known Allergies    Objective: There were no vitals filed for this visit  There is no height or weight on file to calculate BMI  Ortho Exam      Left wrist  Incisions well healed   Compartment soft   Brisk cap refill  FDS FDP extensors intact  EPL FPL intact   Patient is  Sensation over median radial ulnar nerves   Limited motion about the wrist as expected   Near full range of motion of the fingers    Physical Exam  Vitals and nursing note reviewed  Constitutional:       Appearance: She is well-developed  HENT:      Head: Normocephalic and atraumatic  Eyes:      General: No scleral icterus  Conjunctiva/sclera: Conjunctivae normal    Cardiovascular:      Rate and Rhythm: Normal rate  Pulmonary:      Effort: Pulmonary effort is normal  No respiratory distress  Musculoskeletal:      Cervical back: Normal range of motion and neck supple  Comments: As noted in HPI   Skin:     General: Skin is warm and dry  Neurological:      Mental Status: She is alert and oriented to person, place, and time  Psychiatric:         Behavior: Behavior normal          I have personally reviewed pertinent films in PACS  X-rays reviewed which demonstrates healed distal radius fracture  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

## 2021-09-20 ENCOUNTER — EVALUATION (OUTPATIENT)
Dept: OCCUPATIONAL THERAPY | Facility: CLINIC | Age: 56
End: 2021-09-20
Payer: COMMERCIAL

## 2021-09-20 DIAGNOSIS — M25.632 STIFFNESS OF LEFT WRIST JOINT: ICD-10-CM

## 2021-09-20 DIAGNOSIS — Z98.890 HISTORY OF REMOVAL OF RETAINED HARDWARE: Primary | ICD-10-CM

## 2021-09-20 PROCEDURE — 97168 OT RE-EVAL EST PLAN CARE: CPT

## 2021-09-20 PROCEDURE — 97110 THERAPEUTIC EXERCISES: CPT

## 2021-09-20 PROCEDURE — 97140 MANUAL THERAPY 1/> REGIONS: CPT

## 2021-09-20 NOTE — PROGRESS NOTES
OT Re-Evaluation     Today's date: 2021  Patient name: Pierre Gong  : 1965  MRN: [id number]  Referring provider: Scooter Francisco DO  Dx:   Encounter Diagnosis     ICD-10-CM    1  History of removal of retained hardware  Z98 890    2  Stiffness of left wrist joint  M25 632        Start Time: 1352  Stop Time: 1443  Total time in clinic (min): 51 minutes    Assessment  Assessment details: Patient reported that she was walking outside on 2021 and that she fell and put her arms out to brace herself  She reported going to the emergency room  Patient underwent ORIF dorsal spanning plate left radius on 2021  Patient had removal of hardware 2021  Patient presented with increased digit range of motion  Wrist range of motion assessed with moderate stiffness in wrist flexion and severe stiffness in wrist extension  Decreased strength since last assessed secondary to recent surgical intervention     Impairments: abnormal or restricted ROM, impaired physical strength and pain with function  Understanding of Dx/Px/POC: good   Prognosis: good    Goals  Short term goals:  Patient to demonstrate understanding of home exercise program in 2 weeks for decreased pain with activities of daily living -MET  Patient to demonstrate increased active range of motion of wrist to 50/45 degrees in 4 weeks to aid in showering  Patient to demonstrate increased  strength to 25 lbs in 4 weeks to increase  on full cup  Patient to increase composite digital flexion to touch distal jo crease in 4 weeks to aid in holding utensils -MET  Patient to report a decrease in pain by at least 1 point on a 0-10 scale in 2 weeks to aid in dressing -MET    Long term goals:  Patient to demonstrate functional active range of motion for independent ADL's by time of discharge  Patient to demonstrate functional strength for independent ADL's by time of discharge      Plan  Patient would benefit from: skilled occupational therapy  Planned modality interventions: thermotherapy: hydrocollator packs  Planned therapy interventions: joint mobilization, manual therapy, stretching, therapeutic activities, therapeutic exercise, flexibility, functional ROM exercises, home exercise program and strengthening  Frequency: 2x week  Duration in weeks: 6  Plan of Care beginning date: 2021  Plan of Care expiration date: 2021  Treatment plan discussed with: patient        Subjective Evaluation    History of Present Illness  Date of onset: 2021  Date of surgery: 2021  Mechanism of injury: surgery and trauma  Mechanism of injury: Patient reported that her wrist is stiff and sore  "At least I can turn a doorknob again  I wasn't able to do that before " Patient reported that she has not attempted coloring yet     Quality of life: good    Pain  Current pain ratin  At best pain ratin  At worst pain ratin  Location: left wrist and forearm  Relieving factors: heat    Social Support  Lives with: spouse    Employment status: working ([address] [place] [address]- out of work currently due to injury     Hobbies: coloring)  Hand dominance: left    Patient Goals  Patient goals for therapy: return to work, decreased edema, decreased pain, increased motion and increased strength  Patient goal: To get back to normal        Objective     Active Range of Motion     Left Elbow   Forearm supination: 65 degrees   Forearm pronation: 53 degrees     Right Elbow   Forearm supination: 71 degrees   Forearm pronation: 65 degrees     Left Wrist   Wrist flexion: 32 degrees   Wrist extension: 17 degrees with pain  Radial deviation: 0 degrees   Ulnar deviation: 8 degrees     Right Wrist   Wrist flexion: 64 degrees   Wrist extension: 56 degrees   Radial deviation: 15 degrees   Ulnar deviation: 22 degrees     Additional Active Range of Motion Details  Distance to distal palmar crease:  Left hand:  Thumb: 1 5 cm  Index: 2 cm  Lon 5 cm  Rin cm  Small:1 5 cm    Strength/Myotome Testing     Left Wrist/Hand      (2nd hand position)     Trial 1: 12    Thumb Strength  Key/Lateral Pinch     Trial 1: 4  Palmar/Three-Point Pinch     Trial 1: 6    Right Wrist/Hand      (2nd hand position)     Trial 1: 42    Thumb Strength   Key/Lateral Pinch     Trial 1: 13  Palmar/Three-Point Pinch     Trial 1: 12    Swelling     Left Wrist/Hand   Circumference MCP: 21 cm  Circumference wrist: 18 8 cm    Right Wrist/Hand   Circumference MCP: 20 3 cm  Circumference wrist: 16 4 cm             Precautions: s/p removal of spanning plate; no restrictions    Manuals HEP 9/20/2021    Grade 2-3 mobs  x5 min   Scar mobilization  x3 min             Ther Ex     Tendon glides x10    Digit ext/abd x10 x10   AROM thumb- all planes x10 x10   Resisted digit extension Red x10    AROM wrist/forearm x10 x10   Wrist flexor/extensor stretch 10 sec x3 10 sec x3   Prayer stretch 10 sec x3 10 sec x3   Gripper          Ther Activity     Juxtaciser  x10   Dice- in hand     Iron balls  x10   Wall slides     Towel scrunches  x10   Digi-flex     Theraputty- , roll, pinch Yellow    Ball twirls          Modalities     Moist heat  x5 min             Assessment: Patient tolerated session well  Session focused on digit range of motion, edema control, and gentle strengthening  Patient tolerated introduction of wrist range of motion with some complaints of pain  Provided home exercise program for wrist active and passive range of motion  She demonstrated understanding with use of handout  Patient tolerated new range of motion exercises well  Wrist rotator attempted, however patient unable to perform secondary to decreased wrist range of motion  Plan: Focus on digit and elbow range of motion, strengthening, and edema control

## 2021-09-22 ENCOUNTER — OFFICE VISIT (OUTPATIENT)
Dept: OCCUPATIONAL THERAPY | Facility: CLINIC | Age: 56
End: 2021-09-22
Payer: COMMERCIAL

## 2021-09-22 DIAGNOSIS — M25.632 STIFFNESS OF LEFT WRIST JOINT: Primary | ICD-10-CM

## 2021-09-22 DIAGNOSIS — Z98.890 HISTORY OF REMOVAL OF RETAINED HARDWARE: ICD-10-CM

## 2021-09-22 PROCEDURE — 97140 MANUAL THERAPY 1/> REGIONS: CPT

## 2021-09-22 PROCEDURE — 97110 THERAPEUTIC EXERCISES: CPT

## 2021-09-22 PROCEDURE — 97530 THERAPEUTIC ACTIVITIES: CPT

## 2021-09-22 NOTE — PROGRESS NOTES
Daily Note     Today's date: 2021  Patient name: Fredi Bentley  : 1965  MRN: [id number]  Referring provider: Ruby Villa DO  Dx:   Encounter Diagnosis     ICD-10-CM    1  Stiffness of left wrist joint  M25 632    2  History of removal of retained hardware  Z98 890        Start Time: 843  Stop Time: 929  Total time in clinic (min): 46 minutes    Subjective: Patient reported that her wrist was sore after writing yesterday  Objective: See treatment diary below       Precautions: s/p removal of spanning plate; no restrictions    Manuals HEP 2021     Grade 2-3 mobs  x5 min   Scar mobilization/IASTM  x5 min             Ther Ex     Tendon glides x10    Digit ext/abd x10 x10   AROM thumb- all planes x10 x10   Resisted digit extension Red x10    AROM wrist/forearm x10 x10   Wrist flexor/extensor stretch 10 sec x3 10 sec x3   Prayer stretch 10 sec x3 10 sec x3   Gripper  2 lb x10        Ther Activity     Juxtaciser  x10   Wrist rotator  x10   Iron balls  x10   Wall slides     Towel scrunches  x10   Theraband flex bar bends  Yellow x10   Theraputty- , roll, pinch Yellow    Ball twirls          Modalities     Moist heat  x5 min             Assessment: Patient tolerated session well  Session focused on digit range of motion, edema control, and gentle strengthening  Patient reported being compliant with home exercise program  She noted difficulty performing self passive stretches  Patient educated on new technique to make self stretching easier  She demonstrated understanding  Patient tolerated re-introduction of gentle strengthening exercises  Patient able to perform wrist rotator exercise with modifications  Plan: Focus on digit and elbow range of motion, strengthening, and edema control

## 2021-09-23 ENCOUNTER — OFFICE VISIT (OUTPATIENT)
Dept: FAMILY MEDICINE CLINIC | Facility: CLINIC | Age: 56
End: 2021-09-23
Payer: COMMERCIAL

## 2021-09-23 VITALS
HEIGHT: 66 IN | WEIGHT: 201 LBS | HEART RATE: 87 BPM | OXYGEN SATURATION: 98 % | DIASTOLIC BLOOD PRESSURE: 80 MMHG | RESPIRATION RATE: 18 BRPM | SYSTOLIC BLOOD PRESSURE: 134 MMHG | BODY MASS INDEX: 32.3 KG/M2 | TEMPERATURE: 97.5 F

## 2021-09-23 DIAGNOSIS — F32.A DEPRESSION, UNSPECIFIED DEPRESSION TYPE: ICD-10-CM

## 2021-09-23 DIAGNOSIS — Z01.419 WELL WOMAN EXAM: Primary | ICD-10-CM

## 2021-09-23 DIAGNOSIS — Z12.39 SCREENING BREAST EXAMINATION: ICD-10-CM

## 2021-09-23 PROCEDURE — 99396 PREV VISIT EST AGE 40-64: CPT | Performed by: INTERNAL MEDICINE

## 2021-09-23 PROCEDURE — 3008F BODY MASS INDEX DOCD: CPT | Performed by: INTERNAL MEDICINE

## 2021-09-23 PROCEDURE — 3725F SCREEN DEPRESSION PERFORMED: CPT | Performed by: INTERNAL MEDICINE

## 2021-09-23 PROCEDURE — 1036F TOBACCO NON-USER: CPT | Performed by: INTERNAL MEDICINE

## 2021-09-23 NOTE — PATIENT INSTRUCTIONS
TAPER LEXAPRO AS FOLLOWS:    TAKE 1/2 TABLET EVERY DAY FOR 2 WEEKS,   THEN 1/2 TABLET EVERY OTHER DAY FOR 2 WEEKS,  THEN STOP

## 2021-09-23 NOTE — PROGRESS NOTES
FAMILY PRACTICE HEALTH MAINTENANCE OFFICE VISIT  [facility] - [address]    NAME: Thelma Willis  AGE: 60 y o  SEX: female  : 1965     DATE: 2021    Assessment and Plan     1  Well woman exam  -     IGP, Aptima HPV    2  BMI 32 0-32 9,adult    3  Depression, unspecified depression type    4  Screening breast examination  -     Mammo screening bilateral w 3d & cad; Future; Expected date: 2021        · Patient Counseling:   · Nutrition: Stressed importance of a well balanced diet, moderation of sodium/saturated fat, caloric balance and sufficient intake of fiber  · Exercise: Stressed the importance of regular exercise with a goal of 150 minutes per week  · Dental Health: Discussed daily flossing and brushing and regular dental visits     · Immunizations reviewed: Up To Date  · Discussed benefits of:  Colon Cancer Screening, Mammogram , Cervical Cancer screening and Screening labs   BMI Counseling: Body mass index is 32 44 kg/m²  Discussed with patient's BMI with her  The BMI is above normal  Nutrition recommendations include reducing portion sizes, decreasing overall calorie intake and 3-5 servings of fruits/vegetables daily  Exercise recommendations include moderate aerobic physical activity for 150 minutes/week  Return in about 6 months (around 3/23/2022)          Chief Complaint     Chief Complaint   Patient presents with    Physical Exam     [id number]       History of Present Illness     HPI    Well Adult Physical   Patient here for a comprehensive physical exam       Diet and Physical Activity  Diet: well balanced diet  Exercise: rarely      Depression Screen  PHQ-9 Depression Screening    PHQ-9:   Frequency of the following problems over the past two weeks:      Little interest or pleasure in doing things: 0 - not at all  Feeling down, depressed, or hopeless: 0 - not at all  Trouble falling or staying asleep, or sleeping too much: 0 - not at all  Feeling tired or having little energy: 0 - not at all  Poor appetite or overeatin - not at all  Feeling bad about yourself - or that you are a failure or have let yourself or your family down: 0 - not at all  Trouble concentrating on things, such as reading the newspaper or watching television: 0 - not at all  Moving or speaking so slowly that other people could have noticed  Or the opposite - being so fidgety or restless that you have been moving around a lot more than usual: 0 - not at all  Thoughts that you would be better off dead, or of hurting yourself in some way: 0 - not at all  PHQ-2 Score: 0  PHQ-9 Score: 0          General Health  Hearing: Normal:  bilateral  Vision: no vision problems  Dental: no dental visits for >1 year    Reproductive Health  No issues  and Post-menopausal       The following portions of the patient's history were reviewed and updated as appropriate: allergies, current medications, past family history, past medical history, past social history, past surgical history and problem list     Review of Systems     Review of Systems    Past Medical History     Past Medical History:   Diagnosis Date    Anal fissure     Anxiety     Depression     History of snoring     Hypertension     PONV (postoperative nausea and vomiting)        Past Surgical History     Past Surgical History:   Procedure Laterality Date     SECTION      CHOLECYSTECTOMY      GASTRIC BYPASS      MA COLONOSCOPY FLX DX W/COLLJ Lisa  PFRMD N/A 10/5/2018    Procedure: COLONOSCOPY;  Surgeon: Ara Vargas MD;  [facility]; Service: Colorectal    MA EXCIS SYNOV WRIST,EXTENS TENDON Left 2021    Procedure: TENOSYNOVECTOMY;  Surgeon: Ren Dsouza DO;  Location: [address] [place] [address];  Service: Orthopedics    MA OPEN RX DISTAL RADIUS FX, INTRA-ARTICULAR, 3+ FRAG Left 2021    Procedure: OPEN REDUCTION W/ INTERNAL FIXATION (ORIF) RADIUS / ULNA (WRIST);   Surgeon: Ren Dsouza DO;  Location: [address] [place] [address]; Service: Orthopedics    AR REMOVAL ANAL FISTULA,SUBCUTANEOUS N/A 2/15/2019    Procedure: FISTULOTOMY, EUA, Fissurectomy, removal of external hemorrhoids;  Surgeon: Lance Liriano MD;  [facility];  Service: Colorectal    AR REMOVAL DEEP IMPLANT Left 9/1/2021    Procedure: REMOVAL HARDWARE RADIUS / Paras [place] (WRIST); Surgeon: Bert Ambriz DO;  [facility];  Service: Orthopedics    WISDOM TOOTH EXTRACTION         Social History     Social History     Socioeconomic History    Marital status: /Civil Union     Spouse name: None    Number of children: None    Years of education: None    Highest education level: None   Occupational History    None   Tobacco Use    Smoking status: Never Smoker    Smokeless tobacco: Never Used   Vaping Use    Vaping Use: Never used   Substance and Sexual Activity    Alcohol use: Yes     Comment: Socially    Drug use: No    Sexual activity: None   Other Topics Concern    None   Social History Narrative    None     Social Determinants of Health     Financial Resource Strain:     Difficulty of Paying Living Expenses:    Food Insecurity:     Worried About Running Out of Food in the Last Year:     Ran Out of Food in the Last Year:    Transportation Needs:     Lack of Transportation (Medical):      Lack of Transportation (Non-Medical):    Physical Activity:     Days of Exercise per Week:     Minutes of Exercise per Session:    Stress:     Feeling of Stress :    Social Connections:     Frequency of Communication with Friends and Family:     Frequency of Social Gatherings with Friends and Family:     Attends [employer] Services:     Active Member of Clubs or Organizations:     Attends Club or Organization Meetings:     Marital Status:    Intimate Partner Violence:     Fear of Current or Ex-Partner:     Emotionally Abused:     Physically Abused:     Sexually Abused:        Family History     Family History   Problem Relation Age of Onset    Hypertension Mother     Hypertension Father     Other Father         Renal failure    Stomach cancer Father     Heart disease Father     No Known Problems Maternal Aunt     No Known Problems Maternal Aunt     No Known Problems Maternal Aunt     No Known Problems Maternal Aunt     No Known Problems Paternal Aunt     No Known Problems Paternal Aunt     No Known Problems Paternal Aunt        Current Medications       Current Outpatient Medications:     calcium carbonate (TUMS) 500 mg chewable tablet, Chew 1 tablet daily, Disp: , Rfl:     calcium polycarbophil (FIBERCON) 625 mg tablet, Take 625 mg by mouth daily, Disp: , Rfl:     escitalopram (LEXAPRO) 10 mg tablet, Take 1 tablet (10 mg total) by mouth daily, Disp: 90 tablet, Rfl: 1    metoprolol tartrate (LOPRESSOR) 25 mg tablet, Take 1 tablet (25 mg total) by mouth daily, Disp: 30 tablet, Rfl: 5     Allergies     No Known Allergies    Objective     /80   Pulse 87   Temp 97 5 °F (36 4 °C)   Resp 18   Ht 5' 6" (1 676 m)   Wt 91 2 kg (201 lb)   SpO2 98%   BMI 32 44 kg/m²      Physical Exam  Exam conducted with a chaperone present  Constitutional:       General: She is not in acute distress  Appearance: She is well-developed  She is not diaphoretic  HENT:      Head: Normocephalic and atraumatic  Right Ear: External ear normal       Left Ear: External ear normal    Neck:      Thyroid: No thyromegaly  Cardiovascular:      Rate and Rhythm: Normal rate and regular rhythm  Heart sounds: Normal heart sounds  No murmur heard  No friction rub  No gallop  Pulmonary:      Effort: Pulmonary effort is normal       Breath sounds: Normal breath sounds  No wheezing or rales  Abdominal:      General: Bowel sounds are normal       Palpations: Abdomen is soft  There is no mass  Tenderness: There is no abdominal tenderness  There is no rebound  Genitourinary:     General: Normal vulva        Vagina: Normal       Cervix: Normal  Uterus: Normal        Adnexa: Right adnexa normal and left adnexa normal       Comments: Breasts without mass or discharge bilaterally  Musculoskeletal:         General: No deformity  Normal range of motion  Cervical back: Normal range of motion and neck supple  Lymphadenopathy:      Cervical: No cervical adenopathy  Skin:     General: Skin is warm and dry  Findings: No rash  Neurological:      Mental Status: She is alert and oriented to person, place, and time  Cranial Nerves: No cranial nerve deficit  Motor: No abnormal muscle tone  Coordination: Coordination normal       Deep Tendon Reflexes: Reflexes are normal and symmetric  Reflexes normal    Psychiatric:         Behavior: Behavior normal          Thought Content:  Thought content normal          Judgment: Judgment normal             Visual Acuity Screening    Right eye Left eye Both eyes   Without correction: 20/20 20/25 20/20   With correction:              MD CHAD Epperson [facility]

## 2021-09-25 LAB
CYTOLOGIST CVX/VAG CYTO: NORMAL
DX ICD CODE: NORMAL
HPV I/H RISK 4 DNA CVX QL PROBE+SIG AMP: NEGATIVE
OTHER STN SPEC: NORMAL
PATH REPORT.FINAL DX SPEC: NORMAL
SL AMB NOTE:: NORMAL
SL AMB SPECIMEN ADEQUACY: NORMAL
SL AMB TEST METHODOLOGY: NORMAL

## 2021-09-27 ENCOUNTER — OFFICE VISIT (OUTPATIENT)
Dept: OCCUPATIONAL THERAPY | Facility: CLINIC | Age: 56
End: 2021-09-27
Payer: COMMERCIAL

## 2021-09-27 DIAGNOSIS — M25.632 STIFFNESS OF LEFT WRIST JOINT: ICD-10-CM

## 2021-09-27 DIAGNOSIS — Z98.890 HISTORY OF REMOVAL OF RETAINED HARDWARE: Primary | ICD-10-CM

## 2021-09-27 PROCEDURE — 97530 THERAPEUTIC ACTIVITIES: CPT

## 2021-09-27 PROCEDURE — 97110 THERAPEUTIC EXERCISES: CPT

## 2021-09-27 NOTE — PROGRESS NOTES
Daily Note     Today's date: 2021  Patient name: Tran Land  : 1965  MRN: [id number]  Referring provider: Maryann Lopes DO  Dx:   Encounter Diagnosis     ICD-10-CM    1  History of removal of retained hardware  Z98 890    2  Stiffness of left wrist joint  M25 632        Start Time: 923  Stop Time: 1015  Total time in clinic (min): 52 minutes    Subjective: Patient offered no change in function       Objective: See treatment diary below       Precautions: s/p removal of spanning plate; no restrictions    Manuals HEP 2021     Grade 2-3 mobs  x5 min   Scar mobilization/IASTM  x3 min             Ther Ex     Tendon glides x10    Digit ext/abd x10 x10   AROM thumb- all planes x10 x10   Resisted digit extension Red x10    AROM wrist/forearm x10 x10   Wrist flexor/extensor stretch 10 sec x3 10 sec x3   Prayer stretch 10 sec x3 10 sec x3   Gripper  Level 2 x10        Ther Activity     Juxtaciser  x10   Wrist rotator  x10   Iron balls  x10   Wall slides     Towel scrunches  x10   Theraband flex bar bends  Yellow x10   Theraputty- , roll, pinch Yellow    Ball twirls          Modalities     Moist heat  x5 min             Assessment: Patient tolerated session well  Session focused on digit range of motion, edema control, and gentle strengthening  Patient reported being compliant with home exercise program  Patient continues with significant stiffness in wrist with therapist assisted wrist extension  Patient continues to be challenged by current exercises  No change to TE this date  Patient tolerated re-introduction of gentle strengthening exercises  Patient able to perform wrist rotator exercise with modifications  Plan: Focus on digit and elbow range of motion, strengthening, and edema control   POC: 2021 - 2021

## 2021-09-30 ENCOUNTER — OFFICE VISIT (OUTPATIENT)
Dept: OCCUPATIONAL THERAPY | Facility: CLINIC | Age: 56
End: 2021-09-30
Payer: COMMERCIAL

## 2021-09-30 DIAGNOSIS — Z87.81 S/P ORIF (OPEN REDUCTION INTERNAL FIXATION) FRACTURE: Primary | ICD-10-CM

## 2021-09-30 DIAGNOSIS — Z98.890 S/P ORIF (OPEN REDUCTION INTERNAL FIXATION) FRACTURE: Primary | ICD-10-CM

## 2021-09-30 DIAGNOSIS — M25.632 STIFFNESS OF LEFT WRIST JOINT: ICD-10-CM

## 2021-09-30 DIAGNOSIS — Z98.890 HISTORY OF REMOVAL OF RETAINED HARDWARE: ICD-10-CM

## 2021-09-30 PROCEDURE — 97110 THERAPEUTIC EXERCISES: CPT

## 2021-09-30 PROCEDURE — 97140 MANUAL THERAPY 1/> REGIONS: CPT

## 2021-09-30 PROCEDURE — 97530 THERAPEUTIC ACTIVITIES: CPT

## 2021-09-30 NOTE — PROGRESS NOTES
Daily Note     Today's date: 2021  Patient name: Slava Ramirez  : 1965  MRN: [id number]  Referring provider: Lalito Galvez DO  Dx:   Encounter Diagnosis     ICD-10-CM    1  S/P ORIF (open reduction internal fixation) fracture  Z98 890     Z87 81    2  Stiffness of left wrist joint  M25 632    3  History of removal of retained hardware  Z98 890        Start Time: 918  Stop Time: 1008  Total time in clinic (min): 50 minutes    Subjective: Patient reported that she is noticing a small difference in wrist movement  Objective: See treatment diary below       Precautions: s/p removal of spanning plate; no restrictions    Manuals HEP 2021     Grade 2-3 mobs  x8 min   Scar mobilization/IASTM  x4 min             Ther Ex     Tendon glides x10    Digit ext/abd x10 x10   AROM thumb- all planes x10 x10   Resisted digit extension Red x10    AROM wrist/forearm x10 x10   Wrist flexor/extensor stretch 10 sec x3 10 sec x3   Prayer stretch 10 sec x3 10 sec x3   Gripper  Level 2 x10        Ther Activity     Juxtaciser  x10   Wrist rotator  x10   Iron balls  x10   Wall slides     Towel scrunches  x10   Theraband flex bar bends  Yellow x10   Theraputty- , roll, pinch Yellow    Ball twirls          Modalities     Moist heat  x5 min             Assessment: Patient tolerated session well  Session focused on digit range of motion, edema control, and gentle strengthening  Patient reported being compliant with home exercise program  Patient continues with significant stiffness in wrist with therapist assisted wrist extension  Patient continues to be challenged by current exercises  No change to TE this date  She reported being compliant with home stretches, but that she has difficulty achieving wrist extension  Patient benefiting from occupational therapy to increase range of motion to increase independence with IADLs  Plan: Focus on digit and elbow range of motion, strengthening, and edema control   POC: 9/20/2021 - 11/1/2021

## 2021-10-04 ENCOUNTER — OFFICE VISIT (OUTPATIENT)
Dept: OCCUPATIONAL THERAPY | Facility: CLINIC | Age: 56
End: 2021-10-04
Payer: COMMERCIAL

## 2021-10-04 DIAGNOSIS — M25.632 STIFFNESS OF LEFT WRIST JOINT: ICD-10-CM

## 2021-10-04 DIAGNOSIS — Z98.890 HISTORY OF REMOVAL OF RETAINED HARDWARE: Primary | ICD-10-CM

## 2021-10-04 PROCEDURE — 97140 MANUAL THERAPY 1/> REGIONS: CPT

## 2021-10-04 PROCEDURE — 97110 THERAPEUTIC EXERCISES: CPT

## 2021-10-04 PROCEDURE — 97530 THERAPEUTIC ACTIVITIES: CPT

## 2021-10-06 ENCOUNTER — OFFICE VISIT (OUTPATIENT)
Dept: OCCUPATIONAL THERAPY | Facility: CLINIC | Age: 56
End: 2021-10-06
Payer: COMMERCIAL

## 2021-10-06 ENCOUNTER — TELEPHONE (OUTPATIENT)
Dept: OBGYN CLINIC | Facility: CLINIC | Age: 56
End: 2021-10-06

## 2021-10-06 DIAGNOSIS — Z98.890 S/P ORIF (OPEN REDUCTION INTERNAL FIXATION) FRACTURE: ICD-10-CM

## 2021-10-06 DIAGNOSIS — Z98.890 HISTORY OF REMOVAL OF RETAINED HARDWARE: Primary | ICD-10-CM

## 2021-10-06 DIAGNOSIS — M25.632 STIFFNESS OF LEFT WRIST JOINT: ICD-10-CM

## 2021-10-06 DIAGNOSIS — Z87.81 S/P ORIF (OPEN REDUCTION INTERNAL FIXATION) FRACTURE: ICD-10-CM

## 2021-10-06 PROCEDURE — 97110 THERAPEUTIC EXERCISES: CPT

## 2021-10-06 PROCEDURE — 97140 MANUAL THERAPY 1/> REGIONS: CPT

## 2021-10-06 PROCEDURE — 97530 THERAPEUTIC ACTIVITIES: CPT

## 2021-10-11 ENCOUNTER — OFFICE VISIT (OUTPATIENT)
Dept: OBGYN CLINIC | Facility: CLINIC | Age: 56
End: 2021-10-11

## 2021-10-11 ENCOUNTER — OFFICE VISIT (OUTPATIENT)
Dept: OCCUPATIONAL THERAPY | Facility: CLINIC | Age: 56
End: 2021-10-11
Payer: COMMERCIAL

## 2021-10-11 VITALS
DIASTOLIC BLOOD PRESSURE: 78 MMHG | WEIGHT: 201 LBS | BODY MASS INDEX: 32.3 KG/M2 | SYSTOLIC BLOOD PRESSURE: 132 MMHG | HEIGHT: 66 IN | HEART RATE: 84 BPM

## 2021-10-11 DIAGNOSIS — M25.632 STIFFNESS OF LEFT WRIST JOINT: ICD-10-CM

## 2021-10-11 DIAGNOSIS — Z98.890 HISTORY OF REMOVAL OF RETAINED HARDWARE: Primary | ICD-10-CM

## 2021-10-11 DIAGNOSIS — Z98.890 S/P ORIF (OPEN REDUCTION INTERNAL FIXATION) FRACTURE: ICD-10-CM

## 2021-10-11 DIAGNOSIS — Z87.81 S/P ORIF (OPEN REDUCTION INTERNAL FIXATION) FRACTURE: ICD-10-CM

## 2021-10-11 DIAGNOSIS — S52.502A CLOSED FRACTURE OF DISTAL END OF LEFT RADIUS, UNSPECIFIED FRACTURE MORPHOLOGY, INITIAL ENCOUNTER: Primary | ICD-10-CM

## 2021-10-11 PROCEDURE — 99024 POSTOP FOLLOW-UP VISIT: CPT | Performed by: ORTHOPAEDIC SURGERY

## 2021-10-11 PROCEDURE — 3008F BODY MASS INDEX DOCD: CPT | Performed by: INTERNAL MEDICINE

## 2021-10-11 PROCEDURE — 97530 THERAPEUTIC ACTIVITIES: CPT

## 2021-10-11 PROCEDURE — 97140 MANUAL THERAPY 1/> REGIONS: CPT

## 2021-10-11 PROCEDURE — 97110 THERAPEUTIC EXERCISES: CPT

## 2021-10-13 ENCOUNTER — OFFICE VISIT (OUTPATIENT)
Dept: OCCUPATIONAL THERAPY | Facility: CLINIC | Age: 56
End: 2021-10-13
Payer: COMMERCIAL

## 2021-10-13 DIAGNOSIS — M25.632 STIFFNESS OF LEFT WRIST JOINT: ICD-10-CM

## 2021-10-13 DIAGNOSIS — Z98.890 HISTORY OF REMOVAL OF RETAINED HARDWARE: Primary | ICD-10-CM

## 2021-10-13 PROCEDURE — 97530 THERAPEUTIC ACTIVITIES: CPT

## 2021-10-13 PROCEDURE — 97140 MANUAL THERAPY 1/> REGIONS: CPT

## 2021-10-13 PROCEDURE — 97110 THERAPEUTIC EXERCISES: CPT

## 2021-10-15 ENCOUNTER — TELEPHONE (OUTPATIENT)
Dept: OBGYN CLINIC | Facility: CLINIC | Age: 56
End: 2021-10-15

## 2021-10-18 ENCOUNTER — OFFICE VISIT (OUTPATIENT)
Dept: OCCUPATIONAL THERAPY | Facility: CLINIC | Age: 56
End: 2021-10-18
Payer: COMMERCIAL

## 2021-10-18 DIAGNOSIS — Z98.890 S/P ORIF (OPEN REDUCTION INTERNAL FIXATION) FRACTURE: ICD-10-CM

## 2021-10-18 DIAGNOSIS — Z98.890 HISTORY OF REMOVAL OF RETAINED HARDWARE: Primary | ICD-10-CM

## 2021-10-18 DIAGNOSIS — Z87.81 S/P ORIF (OPEN REDUCTION INTERNAL FIXATION) FRACTURE: ICD-10-CM

## 2021-10-18 DIAGNOSIS — M25.632 STIFFNESS OF LEFT WRIST JOINT: ICD-10-CM

## 2021-10-18 PROCEDURE — 97110 THERAPEUTIC EXERCISES: CPT

## 2021-10-18 PROCEDURE — 97530 THERAPEUTIC ACTIVITIES: CPT

## 2021-10-20 ENCOUNTER — OFFICE VISIT (OUTPATIENT)
Dept: OCCUPATIONAL THERAPY | Facility: CLINIC | Age: 56
End: 2021-10-20
Payer: COMMERCIAL

## 2021-10-20 DIAGNOSIS — Z98.890 HISTORY OF REMOVAL OF RETAINED HARDWARE: Primary | ICD-10-CM

## 2021-10-20 DIAGNOSIS — M25.632 STIFFNESS OF LEFT WRIST JOINT: ICD-10-CM

## 2021-10-20 DIAGNOSIS — Z87.81 S/P ORIF (OPEN REDUCTION INTERNAL FIXATION) FRACTURE: ICD-10-CM

## 2021-10-20 DIAGNOSIS — Z98.890 S/P ORIF (OPEN REDUCTION INTERNAL FIXATION) FRACTURE: ICD-10-CM

## 2021-10-20 PROCEDURE — 97110 THERAPEUTIC EXERCISES: CPT

## 2021-10-20 PROCEDURE — 97140 MANUAL THERAPY 1/> REGIONS: CPT

## 2021-10-20 PROCEDURE — 97530 THERAPEUTIC ACTIVITIES: CPT

## 2021-10-25 ENCOUNTER — OFFICE VISIT (OUTPATIENT)
Dept: OCCUPATIONAL THERAPY | Facility: CLINIC | Age: 56
End: 2021-10-25
Payer: COMMERCIAL

## 2021-10-25 DIAGNOSIS — Z98.890 HISTORY OF REMOVAL OF RETAINED HARDWARE: Primary | ICD-10-CM

## 2021-10-25 DIAGNOSIS — Z87.81 S/P ORIF (OPEN REDUCTION INTERNAL FIXATION) FRACTURE: ICD-10-CM

## 2021-10-25 DIAGNOSIS — Z98.890 S/P ORIF (OPEN REDUCTION INTERNAL FIXATION) FRACTURE: ICD-10-CM

## 2021-10-25 DIAGNOSIS — M25.632 STIFFNESS OF LEFT WRIST JOINT: ICD-10-CM

## 2021-10-25 PROCEDURE — 97140 MANUAL THERAPY 1/> REGIONS: CPT

## 2021-10-25 PROCEDURE — 97530 THERAPEUTIC ACTIVITIES: CPT

## 2021-10-25 PROCEDURE — 97110 THERAPEUTIC EXERCISES: CPT

## 2021-10-27 ENCOUNTER — EVALUATION (OUTPATIENT)
Dept: OCCUPATIONAL THERAPY | Facility: CLINIC | Age: 56
End: 2021-10-27
Payer: COMMERCIAL

## 2021-10-27 DIAGNOSIS — Z98.890 HISTORY OF REMOVAL OF RETAINED HARDWARE: Primary | ICD-10-CM

## 2021-10-27 DIAGNOSIS — M25.632 STIFFNESS OF LEFT WRIST JOINT: ICD-10-CM

## 2021-10-27 DIAGNOSIS — Z98.890 S/P ORIF (OPEN REDUCTION INTERNAL FIXATION) FRACTURE: ICD-10-CM

## 2021-10-27 DIAGNOSIS — Z87.81 S/P ORIF (OPEN REDUCTION INTERNAL FIXATION) FRACTURE: ICD-10-CM

## 2021-10-27 PROCEDURE — 97110 THERAPEUTIC EXERCISES: CPT

## 2021-10-27 PROCEDURE — 97140 MANUAL THERAPY 1/> REGIONS: CPT

## 2021-10-27 PROCEDURE — 97530 THERAPEUTIC ACTIVITIES: CPT

## 2021-11-01 ENCOUNTER — OFFICE VISIT (OUTPATIENT)
Dept: OCCUPATIONAL THERAPY | Facility: CLINIC | Age: 56
End: 2021-11-01
Payer: COMMERCIAL

## 2021-11-01 DIAGNOSIS — Z87.81 S/P ORIF (OPEN REDUCTION INTERNAL FIXATION) FRACTURE: ICD-10-CM

## 2021-11-01 DIAGNOSIS — Z98.890 S/P ORIF (OPEN REDUCTION INTERNAL FIXATION) FRACTURE: ICD-10-CM

## 2021-11-01 DIAGNOSIS — Z98.890 HISTORY OF REMOVAL OF RETAINED HARDWARE: Primary | ICD-10-CM

## 2021-11-01 DIAGNOSIS — M25.632 STIFFNESS OF LEFT WRIST JOINT: ICD-10-CM

## 2021-11-01 PROCEDURE — 97110 THERAPEUTIC EXERCISES: CPT

## 2021-11-01 PROCEDURE — 97530 THERAPEUTIC ACTIVITIES: CPT

## 2021-11-01 PROCEDURE — 97140 MANUAL THERAPY 1/> REGIONS: CPT

## 2021-11-03 ENCOUNTER — OFFICE VISIT (OUTPATIENT)
Dept: OCCUPATIONAL THERAPY | Facility: CLINIC | Age: 56
End: 2021-11-03
Payer: COMMERCIAL

## 2021-11-03 DIAGNOSIS — M25.632 STIFFNESS OF LEFT WRIST JOINT: ICD-10-CM

## 2021-11-03 DIAGNOSIS — Z98.890 S/P ORIF (OPEN REDUCTION INTERNAL FIXATION) FRACTURE: ICD-10-CM

## 2021-11-03 DIAGNOSIS — Z87.81 S/P ORIF (OPEN REDUCTION INTERNAL FIXATION) FRACTURE: ICD-10-CM

## 2021-11-03 DIAGNOSIS — Z98.890 HISTORY OF REMOVAL OF RETAINED HARDWARE: Primary | ICD-10-CM

## 2021-11-03 PROCEDURE — 97140 MANUAL THERAPY 1/> REGIONS: CPT

## 2021-11-03 PROCEDURE — 97110 THERAPEUTIC EXERCISES: CPT

## 2021-11-03 PROCEDURE — 97530 THERAPEUTIC ACTIVITIES: CPT

## 2021-11-06 DIAGNOSIS — F41.9 ANXIETY: ICD-10-CM

## 2021-11-06 RX ORDER — ESCITALOPRAM OXALATE 10 MG/1
TABLET ORAL
Qty: 90 TABLET | Refills: 1 | Status: SHIPPED | OUTPATIENT
Start: 2021-11-06 | End: 2022-02-04 | Stop reason: SDUPTHER

## 2021-11-08 ENCOUNTER — OFFICE VISIT (OUTPATIENT)
Dept: OCCUPATIONAL THERAPY | Facility: CLINIC | Age: 56
End: 2021-11-08
Payer: COMMERCIAL

## 2021-11-08 ENCOUNTER — TELEPHONE (OUTPATIENT)
Dept: OBGYN CLINIC | Facility: HOSPITAL | Age: 56
End: 2021-11-08

## 2021-11-08 DIAGNOSIS — M25.632 STIFFNESS OF LEFT WRIST JOINT: Primary | ICD-10-CM

## 2021-11-08 DIAGNOSIS — Z98.890 HISTORY OF REMOVAL OF RETAINED HARDWARE: ICD-10-CM

## 2021-11-08 DIAGNOSIS — Z87.81 S/P ORIF (OPEN REDUCTION INTERNAL FIXATION) FRACTURE: ICD-10-CM

## 2021-11-08 DIAGNOSIS — Z98.890 S/P ORIF (OPEN REDUCTION INTERNAL FIXATION) FRACTURE: ICD-10-CM

## 2021-11-08 PROCEDURE — 97110 THERAPEUTIC EXERCISES: CPT

## 2021-11-08 PROCEDURE — 97530 THERAPEUTIC ACTIVITIES: CPT

## 2021-11-08 PROCEDURE — 97140 MANUAL THERAPY 1/> REGIONS: CPT

## 2021-11-10 ENCOUNTER — OFFICE VISIT (OUTPATIENT)
Dept: OCCUPATIONAL THERAPY | Facility: CLINIC | Age: 56
End: 2021-11-10
Payer: COMMERCIAL

## 2021-11-10 DIAGNOSIS — Z98.890 S/P ORIF (OPEN REDUCTION INTERNAL FIXATION) FRACTURE: ICD-10-CM

## 2021-11-10 DIAGNOSIS — M25.632 STIFFNESS OF LEFT WRIST JOINT: Primary | ICD-10-CM

## 2021-11-10 DIAGNOSIS — Z87.81 S/P ORIF (OPEN REDUCTION INTERNAL FIXATION) FRACTURE: ICD-10-CM

## 2021-11-10 DIAGNOSIS — Z98.890 HISTORY OF REMOVAL OF RETAINED HARDWARE: ICD-10-CM

## 2021-11-10 PROCEDURE — 97140 MANUAL THERAPY 1/> REGIONS: CPT

## 2021-11-10 PROCEDURE — 97530 THERAPEUTIC ACTIVITIES: CPT

## 2021-11-10 PROCEDURE — 97110 THERAPEUTIC EXERCISES: CPT

## 2021-11-15 ENCOUNTER — OFFICE VISIT (OUTPATIENT)
Dept: OCCUPATIONAL THERAPY | Facility: CLINIC | Age: 56
End: 2021-11-15
Payer: COMMERCIAL

## 2021-11-15 DIAGNOSIS — Z98.890 S/P ORIF (OPEN REDUCTION INTERNAL FIXATION) FRACTURE: ICD-10-CM

## 2021-11-15 DIAGNOSIS — Z98.890 HISTORY OF REMOVAL OF RETAINED HARDWARE: ICD-10-CM

## 2021-11-15 DIAGNOSIS — Z87.81 S/P ORIF (OPEN REDUCTION INTERNAL FIXATION) FRACTURE: ICD-10-CM

## 2021-11-15 DIAGNOSIS — M25.632 STIFFNESS OF LEFT WRIST JOINT: Primary | ICD-10-CM

## 2021-11-15 PROCEDURE — 97140 MANUAL THERAPY 1/> REGIONS: CPT

## 2021-11-15 PROCEDURE — 97530 THERAPEUTIC ACTIVITIES: CPT

## 2021-11-15 PROCEDURE — 97110 THERAPEUTIC EXERCISES: CPT

## 2021-11-17 ENCOUNTER — APPOINTMENT (OUTPATIENT)
Dept: OCCUPATIONAL THERAPY | Facility: CLINIC | Age: 56
End: 2021-11-17
Payer: COMMERCIAL

## 2021-11-17 ENCOUNTER — TELEPHONE (OUTPATIENT)
Dept: OBGYN CLINIC | Facility: MEDICAL CENTER | Age: 56
End: 2021-11-17

## 2021-11-22 ENCOUNTER — OFFICE VISIT (OUTPATIENT)
Dept: OBGYN CLINIC | Facility: CLINIC | Age: 56
End: 2021-11-22

## 2021-11-22 ENCOUNTER — OFFICE VISIT (OUTPATIENT)
Dept: OCCUPATIONAL THERAPY | Facility: CLINIC | Age: 56
End: 2021-11-22
Payer: COMMERCIAL

## 2021-11-22 VITALS
WEIGHT: 201 LBS | TEMPERATURE: 98.4 F | DIASTOLIC BLOOD PRESSURE: 84 MMHG | SYSTOLIC BLOOD PRESSURE: 148 MMHG | HEART RATE: 86 BPM | BODY MASS INDEX: 32.44 KG/M2

## 2021-11-22 DIAGNOSIS — Z98.890 S/P ORIF (OPEN REDUCTION INTERNAL FIXATION) FRACTURE: ICD-10-CM

## 2021-11-22 DIAGNOSIS — M25.632 STIFFNESS OF LEFT WRIST JOINT: ICD-10-CM

## 2021-11-22 DIAGNOSIS — S52.502A CLOSED FRACTURE OF DISTAL END OF LEFT RADIUS, UNSPECIFIED FRACTURE MORPHOLOGY, INITIAL ENCOUNTER: Primary | ICD-10-CM

## 2021-11-22 DIAGNOSIS — Z87.81 S/P ORIF (OPEN REDUCTION INTERNAL FIXATION) FRACTURE: ICD-10-CM

## 2021-11-22 DIAGNOSIS — Z98.890 HISTORY OF REMOVAL OF RETAINED HARDWARE: ICD-10-CM

## 2021-11-22 PROCEDURE — 97140 MANUAL THERAPY 1/> REGIONS: CPT

## 2021-11-22 PROCEDURE — 97110 THERAPEUTIC EXERCISES: CPT

## 2021-11-22 PROCEDURE — 97530 THERAPEUTIC ACTIVITIES: CPT

## 2021-11-22 PROCEDURE — 99024 POSTOP FOLLOW-UP VISIT: CPT | Performed by: ORTHOPAEDIC SURGERY

## 2021-11-24 ENCOUNTER — APPOINTMENT (OUTPATIENT)
Dept: OCCUPATIONAL THERAPY | Facility: CLINIC | Age: 56
End: 2021-11-24
Payer: COMMERCIAL

## 2021-12-20 ENCOUNTER — HOSPITAL ENCOUNTER (OUTPATIENT)
Dept: RADIOLOGY | Facility: HOSPITAL | Age: 56
Discharge: HOME | End: 2021-12-20
Attending: INTERNAL MEDICINE
Payer: COMMERCIAL

## 2021-12-20 VITALS — BODY MASS INDEX: 32.3 KG/M2 | WEIGHT: 201 LBS | HEIGHT: 66 IN

## 2021-12-20 DIAGNOSIS — Z12.39 SCREENING BREAST EXAMINATION: ICD-10-CM

## 2021-12-20 PROCEDURE — 77063 BREAST TOMOSYNTHESIS BI: CPT

## 2021-12-20 PROCEDURE — 77067 SCR MAMMO BI INCL CAD: CPT

## 2022-02-04 DIAGNOSIS — F41.9 ANXIETY: ICD-10-CM

## 2022-02-04 RX ORDER — ESCITALOPRAM OXALATE 10 MG/1
10 TABLET ORAL DAILY
Qty: 90 TABLET | Refills: 1 | Status: SHIPPED | OUTPATIENT
Start: 2022-02-04 | End: 2022-07-30 | Stop reason: SDUPTHER

## 2022-02-28 ENCOUNTER — OFFICE VISIT (OUTPATIENT)
Dept: OBGYN CLINIC | Facility: CLINIC | Age: 57
End: 2022-02-28
Payer: COMMERCIAL

## 2022-02-28 VITALS
BODY MASS INDEX: 32.3 KG/M2 | SYSTOLIC BLOOD PRESSURE: 142 MMHG | WEIGHT: 201 LBS | HEART RATE: 78 BPM | HEIGHT: 66 IN | DIASTOLIC BLOOD PRESSURE: 82 MMHG

## 2022-02-28 DIAGNOSIS — S52.502A CLOSED FRACTURE OF DISTAL END OF LEFT RADIUS, UNSPECIFIED FRACTURE MORPHOLOGY, INITIAL ENCOUNTER: Primary | ICD-10-CM

## 2022-02-28 PROCEDURE — 99213 OFFICE O/P EST LOW 20 MIN: CPT | Performed by: ORTHOPAEDIC SURGERY

## 2022-02-28 PROCEDURE — 3008F BODY MASS INDEX DOCD: CPT | Performed by: ORTHOPAEDIC SURGERY

## 2022-02-28 PROCEDURE — 1036F TOBACCO NON-USER: CPT | Performed by: ORTHOPAEDIC SURGERY

## 2022-02-28 NOTE — PROGRESS NOTES
Assessment/Plan:  1  Closed fracture of distal end of left radius, unspecified fracture morphology, initial encounter         I personally saw and examined the patient myself  Patient doing well after ORIF of left distal radius with spanning plate  She has proximally 9 months out  She has good range of motion which is comparable to the other side  She has good strength  No sign of tendon rupture  She is good sensation and motor to median radial and ulnar nerve distribution  She is very happy with her outcome thus far  She has returned to work and not taking anything for pain  I explained regarding the proximal scar there is some mild skin adhesions between the scar and the soft tissues deeper however I did describe home exercise program for this  This is not very bothersome for the patient however she did inquire  Patient will follow-up with me as needed  She has no restrictions  She can return to work full duty as she has been  Patient is very happy with her outcome  She can use Tylenol as needed for pain however she claims she really has none  Subjective:   Taiwo Mijares is a 60 y o  female who presents approximately 6 months status post hardware removal left wrist at 9 months status post ORIF left wrist with a dorsal spanning plate  Patient is doing well  She notes near full return of her wrist motion  She has been back to work  She is not taking anything for pain  She does note some scar adhesions proximally however these are not painful  She denies any numbness or tingling  She has been back to normal activity claims she forgot she broke her wrist       Review of Systems   Constitutional: Negative for chills, fever and unexpected weight change  HENT: Negative for hearing loss, nosebleeds and sore throat  Eyes: Negative for pain, redness and visual disturbance  Respiratory: Negative for cough, shortness of breath and wheezing      Cardiovascular: Negative for chest pain, palpitations and leg swelling  Gastrointestinal: Negative for abdominal pain, nausea and vomiting  Endocrine: Negative for polydipsia and polyuria  Genitourinary: Negative for dysuria and hematuria  Musculoskeletal:        See HPI   Skin: Negative for rash and wound  Neurological: Negative for dizziness, numbness and headaches  Psychiatric/Behavioral: Negative for decreased concentration and suicidal ideas  The patient is not nervous/anxious  Past Medical History:   Diagnosis Date    Anal fissure     Anxiety     Depression     History of snoring     Hypertension     PONV (postoperative nausea and vomiting)        Past Surgical History:   Procedure Laterality Date     SECTION      CHOLECYSTECTOMY      GASTRIC BYPASS      NM COLONOSCOPY FLX DX W/COLLJ SPEC WHEN PFRMD N/A 10/5/2018    Procedure: COLONOSCOPY;  Surgeon: Jacinto Rock MD;  [facility]; Service: Colorectal    NM EXCIS SYNOV WRIST,EXTENS TENDON Left 2021    Procedure: TENOSYNOVECTOMY;  Surgeon: Joyceann Blizzard, DO;  Location: [address] [place] [address];  Service: Orthopedics    NM OPEN RX DISTAL RADIUS FX, INTRA-ARTICULAR, 3+ FRAG Left 2021    Procedure: OPEN REDUCTION W/ INTERNAL FIXATION (ORIF) RADIUS / ULNA (WRIST); Surgeon: Joyceann Blizzard, DO;  Location: [address] [place] [address];  Service: Orthopedics    NM REMOVAL ANAL Janas Mend N/A 2/15/2019    Procedure: FISTULOTOMY, EUA, Fissurectomy, removal of external hemorrhoids;  Surgeon: Jacinto Rock MD;  Location:  [facility];  Service: Colorectal    NM REMOVAL DEEP IMPLANT Left 2021    Procedure: REMOVAL HARDWARE RADIUS / Marcine Martinet (WRIST);   Surgeon: Joyceann Blizzard, DO;  [facility];  Service: Orthopedics    WISDOM TOOTH EXTRACTION         Family History   Problem Relation Age of Onset    Hypertension Mother     Hypertension Father     Other Father         Renal failure    Stomach cancer Father     Heart disease Father     No Known Problems Maternal Aunt     No Known Problems Maternal Aunt     No Known Problems Maternal Aunt     No Known Problems Maternal Aunt     No Known Problems Paternal Aunt     No Known Problems Paternal Aunt     No Known Problems Paternal Aunt        Social History     Occupational History    Not on file   Tobacco Use    Smoking status: Never Smoker    Smokeless tobacco: Never Used   Vaping Use    Vaping Use: Never used   Substance and Sexual Activity    Alcohol use: Yes     Comment: Socially    Drug use: No    Sexual activity: Not on file         Current Outpatient Medications:     calcium carbonate (TUMS) 500 mg chewable tablet, Chew 1 tablet daily, Disp: , Rfl:     calcium polycarbophil (FIBERCON) 625 mg tablet, Take 625 mg by mouth daily, Disp: , Rfl:     escitalopram (LEXAPRO) 10 mg tablet, Take 1 tablet (10 mg total) by mouth daily, Disp: 90 tablet, Rfl: 1    metoprolol tartrate (LOPRESSOR) 25 mg tablet, Take 1 tablet (25 mg total) by mouth daily, Disp: 30 tablet, Rfl: 5    No Known Allergies    Objective:  Vitals:    02/28/22 0925   BP: 142/82   Pulse: 78       Ortho Exam    Left wrist  Incisions well healed   Compartment soft   Brisk cap refill   Wrist extension is approximately 70   Wrist flexion is approximately 55   Full pronation supination  EPL FPL intact  FDS FDP extensors intact       Physical Exam  Vitals and nursing note reviewed  Constitutional:       Appearance: She is well-developed  HENT:      Head: Normocephalic and atraumatic  Eyes:      General: No scleral icterus  Conjunctiva/sclera: Conjunctivae normal    Cardiovascular:      Rate and Rhythm: Normal rate  Pulmonary:      Effort: Pulmonary effort is normal  No respiratory distress  Musculoskeletal:      Cervical back: Normal range of motion and neck supple  Comments: As noted in HPI   Skin:     General: Skin is warm and dry  Neurological:      Mental Status: She is alert and oriented to person, place, and time  Psychiatric:         Behavior: Behavior normal          I have personally reviewed pertinent films in PACS and my interpretation is as follows:  None today

## 2022-03-07 DIAGNOSIS — I10 BENIGN ESSENTIAL HYPERTENSION: ICD-10-CM

## 2022-07-30 ENCOUNTER — TELEPHONE (OUTPATIENT)
Dept: FAMILY MEDICINE CLINIC | Facility: CLINIC | Age: 57
End: 2022-07-30

## 2022-07-30 DIAGNOSIS — I10 BENIGN ESSENTIAL HYPERTENSION: ICD-10-CM

## 2022-07-30 DIAGNOSIS — F41.9 ANXIETY: ICD-10-CM

## 2022-07-30 DIAGNOSIS — I10 BENIGN ESSENTIAL HYPERTENSION: Primary | ICD-10-CM

## 2022-07-30 DIAGNOSIS — E78.2 MIXED HYPERLIPIDEMIA: ICD-10-CM

## 2022-07-30 NOTE — TELEPHONE ENCOUNTER
Requested medication(s) are due for refill today: Yes  Patient has already received a courtesy refill: no  Failed protocol

## 2022-07-30 NOTE — TELEPHONE ENCOUNTER
She wants a call when this is done  She wants it sent to [address] [place] [address] this time   Kenrick Baca

## 2022-07-30 NOTE — TELEPHONE ENCOUNTER
Patient requesting routine lab work before her September appointment    Please call patient when lab work is ready for

## 2022-08-01 PROBLEM — Z98.890 PONV (POSTOPERATIVE NAUSEA AND VOMITING): Status: RESOLVED | Noted: 2021-06-07 | Resolved: 2022-08-01

## 2022-08-01 PROBLEM — K60.3 ANAL FISTULA: Status: RESOLVED | Noted: 2019-02-11 | Resolved: 2022-08-01

## 2022-08-01 PROBLEM — K61.0 ANAL ABSCESS: Status: RESOLVED | Noted: 2019-02-11 | Resolved: 2022-08-01

## 2022-08-01 PROBLEM — K60.30 ANAL FISTULA: Status: RESOLVED | Noted: 2019-02-11 | Resolved: 2022-08-01

## 2022-08-01 PROBLEM — K56.41 FECAL IMPACTION (HCC): Status: RESOLVED | Noted: 2019-04-15 | Resolved: 2022-08-01

## 2022-08-01 PROBLEM — K60.2 ANAL FISSURE: Status: RESOLVED | Noted: 2019-02-11 | Resolved: 2022-08-01

## 2022-08-01 PROBLEM — R11.2 PONV (POSTOPERATIVE NAUSEA AND VOMITING): Status: RESOLVED | Noted: 2021-06-07 | Resolved: 2022-08-01

## 2022-08-01 RX ORDER — ESCITALOPRAM OXALATE 10 MG/1
10 TABLET ORAL DAILY
Qty: 90 TABLET | Refills: 1 | Status: SHIPPED | OUTPATIENT
Start: 2022-08-01

## 2022-08-01 NOTE — TELEPHONE ENCOUNTER
Alissa is aware her rx's were sent and she asked me to removed [facility] and keep [facility]

## 2022-11-07 ENCOUNTER — RA CDI HCC (OUTPATIENT)
Dept: OTHER | Facility: HOSPITAL | Age: 57
End: 2022-11-07

## 2022-11-07 DIAGNOSIS — F41.9 ANXIETY: ICD-10-CM

## 2022-11-07 RX ORDER — ESCITALOPRAM OXALATE 10 MG/1
TABLET ORAL
Qty: 90 TABLET | Refills: 0 | Status: SHIPPED | OUTPATIENT
Start: 2022-11-07

## 2022-11-07 NOTE — PROGRESS NOTES
[facility]  coding opportunities       Chart reviewed, no opportunity found: CHART REVIEWED, NO OPPORTUNITY FOUND        Patients Insurance        Commercial Insurance: Phyllis oCates

## 2022-11-08 LAB
ALBUMIN SERPL-MCNC: 4.1 G/DL (ref 3.8–4.9)
ALBUMIN/GLOB SERPL: 1.6 {RATIO} (ref 1.2–2.2)
ALP SERPL-CCNC: 110 IU/L (ref 44–121)
ALT SERPL-CCNC: 12 IU/L (ref 0–32)
AST SERPL-CCNC: 23 IU/L (ref 0–40)
BASOPHILS # BLD AUTO: 0 X10E3/UL (ref 0–0.2)
BASOPHILS NFR BLD AUTO: 0 %
BILIRUB SERPL-MCNC: 0.3 MG/DL (ref 0–1.2)
BUN SERPL-MCNC: 10 MG/DL (ref 6–24)
BUN/CREAT SERPL: 11 (ref 9–23)
CALCIUM SERPL-MCNC: 9.1 MG/DL (ref 8.7–10.2)
CHLORIDE SERPL-SCNC: 104 MMOL/L (ref 96–106)
CHOLEST SERPL-MCNC: 236 MG/DL (ref 100–199)
CHOLEST/HDLC SERPL: 4.1 RATIO (ref 0–4.4)
CO2 SERPL-SCNC: 23 MMOL/L (ref 20–29)
CREAT SERPL-MCNC: 0.91 MG/DL (ref 0.57–1)
EGFR: 73 ML/MIN/1.73
EOSINOPHIL # BLD AUTO: 0.1 X10E3/UL (ref 0–0.4)
EOSINOPHIL NFR BLD AUTO: 2 %
ERYTHROCYTE [DISTWIDTH] IN BLOOD BY AUTOMATED COUNT: 15.5 % (ref 11.7–15.4)
GLOBULIN SER-MCNC: 2.6 G/DL (ref 1.5–4.5)
GLUCOSE SERPL-MCNC: 103 MG/DL (ref 70–99)
HCT VFR BLD AUTO: 35.7 % (ref 34–46.6)
HDLC SERPL-MCNC: 58 MG/DL
HGB BLD-MCNC: 11.1 G/DL (ref 11.1–15.9)
IMM GRANULOCYTES # BLD: 0 X10E3/UL (ref 0–0.1)
IMM GRANULOCYTES NFR BLD: 0 %
LDLC SERPL CALC-MCNC: 154 MG/DL (ref 0–99)
LYMPHOCYTES # BLD AUTO: 1.2 X10E3/UL (ref 0.7–3.1)
LYMPHOCYTES NFR BLD AUTO: 21 %
MCH RBC QN AUTO: 23.2 PG (ref 26.6–33)
MCHC RBC AUTO-ENTMCNC: 31.1 G/DL (ref 31.5–35.7)
MCV RBC AUTO: 75 FL (ref 79–97)
MICRODELETION SYND BLD/T FISH: NORMAL
MONOCYTES # BLD AUTO: 0.5 X10E3/UL (ref 0.1–0.9)
MONOCYTES NFR BLD AUTO: 8 %
NEUTROPHILS # BLD AUTO: 4 X10E3/UL (ref 1.4–7)
NEUTROPHILS NFR BLD AUTO: 69 %
PLATELET # BLD AUTO: 319 X10E3/UL (ref 150–450)
POTASSIUM SERPL-SCNC: 4.7 MMOL/L (ref 3.5–5.2)
PROT SERPL-MCNC: 6.7 G/DL (ref 6–8.5)
RBC # BLD AUTO: 4.79 X10E6/UL (ref 3.77–5.28)
SL AMB VLDL CHOLESTEROL CALC: 24 MG/DL (ref 5–40)
SODIUM SERPL-SCNC: 142 MMOL/L (ref 134–144)
TRIGL SERPL-MCNC: 136 MG/DL (ref 0–149)
WBC # BLD AUTO: 5.9 X10E3/UL (ref 3.4–10.8)

## 2022-12-19 ENCOUNTER — OFFICE VISIT (OUTPATIENT)
Dept: FAMILY MEDICINE CLINIC | Facility: CLINIC | Age: 57
End: 2022-12-19

## 2022-12-19 VITALS
WEIGHT: 211 LBS | RESPIRATION RATE: 16 BRPM | HEIGHT: 66 IN | OXYGEN SATURATION: 97 % | HEART RATE: 86 BPM | SYSTOLIC BLOOD PRESSURE: 126 MMHG | DIASTOLIC BLOOD PRESSURE: 76 MMHG | BODY MASS INDEX: 33.91 KG/M2 | TEMPERATURE: 97.8 F

## 2022-12-19 DIAGNOSIS — Z00.00 ROUTINE ADULT HEALTH MAINTENANCE: Primary | ICD-10-CM

## 2022-12-19 DIAGNOSIS — D64.9 BORDERLINE ANEMIA: ICD-10-CM

## 2022-12-19 DIAGNOSIS — Z98.84 H/O BARIATRIC SURGERY: ICD-10-CM

## 2022-12-19 NOTE — PROGRESS NOTES
FAMILY PRACTICE HEALTH MAINTENANCE OFFICE VISIT  [facility] - [address]    NAME: Susie Goyal  AGE: 59 y o  SEX: female  : 1965     DATE: 2022    Assessment and Plan     1  Routine adult health maintenance    2  Borderline anemia  Comments:  s/p bariatric surgery  CRC up to date, postmenopausal   Will repeat labs prior to next visit, consider hem referral if Hgb trends down  Orders:  -     CBC and differential; Future; Expected date: 2023  -     Fe+TIBC+Ryan; Future; Expected date: 2023  -     CBC and differential  -     Fe+TIBC+Ryan    3  H/O bariatric surgery - bypass  Assessment & Plan:  Consider hematology referral if Hgb continues to drop          Patient Counseling:   Nutrition: Stressed importance of a well balanced diet, moderation of sodium/saturated fat, caloric balance and sufficient intake of fiber  Exercise: Stressed the importance of regular exercise with a goal of 150 minutes per week  Dental Health: Discussed daily flossing and brushing and regular dental visits     Immunizations reviewed: Up To Date  Discussed benefits of:  Colon Cancer Screening, Mammogram , Cervical Cancer screening and Screening labs  BMI Counseling: Body mass index is 34 06 kg/m²  Discussed with patient's BMI with her  The BMI is above normal  Exercise recommendations include moderate aerobic physical activity for 150 minutes/week  Return in about 6 months (around 2023)  Chief Complaint     Chief Complaint   Patient presents with   • Physical Exam     Mz cma       History of Present Illness     Here today for CPE  Doing well, no concerns  Labs done prior          Well Adult Physical   Patient here for a comprehensive physical exam       Diet and Physical Activity  Diet: well balanced diet  Exercise: infrequently      Depression Screen  PHQ-2/9 Depression Screening    Little interest or pleasure in doing things: 0 - not at all  Feeling down, depressed, or hopeless: 0 - not at all  Trouble falling or staying asleep, or sleeping too much: 0 - not at all  Feeling tired or having little energy: 1 - several days  Poor appetite or overeatin - not at all  Feeling bad about yourself - or that you are a failure or have let yourself or your family down: 0 - not at all  Trouble concentrating on things, such as reading the newspaper or watching television: 0 - not at all  Moving or speaking so slowly that other people could have noticed  Or the opposite - being so fidgety or restless that you have been moving around a lot more than usual: 0 - not at all  Thoughts that you would be better off dead, or of hurting yourself in some way: 0 - not at all  PHQ-9 Score: 1   PHQ-9 Interpretation: No or Minimal depression           General Health  Hearing: Normal:  bilateral  Vision: goes for regular eye exams  Dental: regular dental visits    Reproductive Health  Post-menopausal       The following portions of the patient's history were reviewed and updated as appropriate: allergies, current medications, past family history, past medical history, past social history, past surgical history and problem list     Review of Systems     Review of Systems   Constitutional: Negative  Respiratory: Negative  Cardiovascular: Negative  Gastrointestinal: Negative  Neurological: Negative  Psychiatric/Behavioral: Negative  Past Medical History     Past Medical History:   Diagnosis Date   • Anal fissure    • Anxiety    • Depression    • History of snoring    • Hypertension    • PONV (postoperative nausea and vomiting)        Past Surgical History     Past Surgical History:   Procedure Laterality Date   •  SECTION     • CHOLECYSTECTOMY     • GASTRIC BYPASS     • WV COLONOSCOPY FLX DX W/COLLJ SPEC WHEN PFRMD N/A 10/5/2018    Procedure: COLONOSCOPY;  Surgeon: Francis Chapa MD;  [facility];   Service: Colorectal   • WV EXCIS SYNOV WRIST,EXTENS TENDON Left 9/1/2021    Procedure: TENOSYNOVECTOMY;  Surgeon: Beth Vides DO;  Location: [address] [place] [address];  Service: Orthopedics   • WY OPEN RX DISTAL RADIUS FX, INTRA-ARTICULAR, 3+ FRAG Left 6/9/2021    Procedure: OPEN REDUCTION W/ INTERNAL FIXATION (ORIF) RADIUS / ULNA (WRIST); Surgeon: Beth Vides DO;  Location: [address] [place] [address];  Service: Orthopedics   • WY REMOVAL ANAL FISTULA,SUBCUTANEOUS N/A 2/15/2019    Procedure: FISTULOTOMY, EUA, Fissurectomy, removal of external hemorrhoids;  Surgeon: Luna Kruse MD;  [facility];  Service: Colorectal   • WY REMOVAL DEEP IMPLANT Left 9/1/2021    Procedure: REMOVAL HARDWARE RADIUS / Jinger Ora (WRIST);   Surgeon: Beth Vides DO;  [facility]: [facility];  Service: Orthopedics   • WISDOM TOOTH EXTRACTION         Social History     Social History     Socioeconomic History   • Marital status: /Civil Union     Spouse name: None   • Number of children: None   • Years of education: None   • Highest education level: None   Occupational History   • None   Tobacco Use   • Smoking status: Never   • Smokeless tobacco: Never   Vaping Use   • Vaping Use: Never used   Substance and Sexual Activity   • Alcohol use: Yes     Comment: Socially   • Drug use: No   • Sexual activity: None   Other Topics Concern   • None   Social History Narrative   • None     Social Determinants of Health     Financial Resource Strain: Not on file   Food Insecurity: Not on file   Transportation Needs: Not on file   Physical Activity: Not on file   Stress: Not on file   Social Connections: Not on file   Intimate Partner Violence: Not on file   Housing Stability: Not on file       Family History     Family History   Problem Relation Age of Onset   • Hypertension Mother    • Hypertension Father    • Other Father         Renal failure   • Stomach cancer Father    • Heart disease Father    • No Known Problems Maternal Aunt    • No Known Problems Maternal Aunt    • No Known Problems Maternal Aunt    • No Known Problems Maternal Aunt    • No Known Problems Paternal Aunt    • No Known Problems Paternal Aunt    • No Known Problems Paternal Aunt        Current Medications       Current Outpatient Medications:   •  calcium carbonate (TUMS) 500 mg chewable tablet, Chew 1 tablet daily, Disp: , Rfl:   •  calcium polycarbophil (FIBERCON) 625 mg tablet, Take 625 mg by mouth daily, Disp: , Rfl:   •  escitalopram (LEXAPRO) 10 mg tablet, TAKE 1 TABLET DAILY, Disp: 90 tablet, Rfl: 0  •  metoprolol tartrate (LOPRESSOR) 25 mg tablet, Take 1 tablet (25 mg total) by mouth daily, Disp: 90 tablet, Rfl: 3     Allergies     No Known Allergies    Objective     /76   Pulse 86   Temp 97 8 °F (36 6 °C)   Resp 16   Ht 5' 6" (1 676 m)   Wt 95 7 kg (211 lb)   SpO2 97%   BMI 34 06 kg/m²      Physical Exam  Vitals and nursing note reviewed  Constitutional:       Appearance: Normal appearance  She is well-developed  She is obese  HENT:      Head: Normocephalic  Right Ear: External ear normal       Left Ear: External ear normal       Nose: Nose normal    Eyes:      Conjunctiva/sclera: Conjunctivae normal       Pupils: Pupils are equal, round, and reactive to light  Neck:      Thyroid: No thyromegaly  Cardiovascular:      Rate and Rhythm: Normal rate and regular rhythm  Pulses: Normal pulses  Heart sounds: Normal heart sounds  No murmur heard  Pulmonary:      Effort: Pulmonary effort is normal       Breath sounds: Normal breath sounds  Abdominal:      General: Bowel sounds are normal  There is no distension  Palpations: Abdomen is soft  Musculoskeletal:         General: Normal range of motion  Cervical back: Neck supple  Lymphadenopathy:      Cervical: No cervical adenopathy  Skin:     General: Skin is warm and dry  Neurological:      Mental Status: She is alert and oriented to person, place, and time  Sensory: No sensory deficit        Coordination: Coordination normal       Deep Tendon Reflexes: Reflexes are normal and symmetric   Reflexes normal    Psychiatric:         Mood and Affect: Mood normal          Behavior: Behavior normal            Vision Screening    Right eye Left eye Both eyes   Without correction 20/30 20/30 20/25   With correction              Santiago Runner, [facility]

## 2022-12-21 ENCOUNTER — HOSPITAL ENCOUNTER (OUTPATIENT)
Dept: RADIOLOGY | Facility: HOSPITAL | Age: 57
Discharge: HOME | End: 2022-12-21
Attending: INTERNAL MEDICINE

## 2022-12-21 VITALS — BODY MASS INDEX: 33.91 KG/M2 | HEIGHT: 66 IN | WEIGHT: 211 LBS

## 2022-12-21 DIAGNOSIS — Z12.31 ENCOUNTER FOR SCREENING MAMMOGRAM FOR MALIGNANT NEOPLASM OF BREAST: ICD-10-CM

## 2023-02-06 DIAGNOSIS — F41.9 ANXIETY: ICD-10-CM

## 2023-02-06 RX ORDER — ESCITALOPRAM OXALATE 10 MG/1
10 TABLET ORAL DAILY
Qty: 90 TABLET | Refills: 0 | Status: SHIPPED | OUTPATIENT
Start: 2023-02-06

## 2023-05-11 DIAGNOSIS — I10 BENIGN ESSENTIAL HYPERTENSION: ICD-10-CM

## 2023-05-11 DIAGNOSIS — F41.9 ANXIETY: ICD-10-CM

## 2023-05-11 RX ORDER — ESCITALOPRAM OXALATE 10 MG/1
10 TABLET ORAL DAILY
Qty: 90 TABLET | Refills: 0 | Status: SHIPPED | OUTPATIENT
Start: 2023-05-11

## 2023-06-27 LAB
BASOPHILS # BLD AUTO: 0 X10E3/UL (ref 0–0.2)
BASOPHILS NFR BLD AUTO: 0 %
EOSINOPHIL # BLD AUTO: 0.2 X10E3/UL (ref 0–0.4)
EOSINOPHIL NFR BLD AUTO: 3 %
ERYTHROCYTE [DISTWIDTH] IN BLOOD BY AUTOMATED COUNT: 16 % (ref 11.7–15.4)
FERRITIN SERPL-MCNC: 9 NG/ML (ref 15–150)
HCT VFR BLD AUTO: 34.9 % (ref 34–46.6)
HGB BLD-MCNC: 11.1 G/DL (ref 11.1–15.9)
IMM GRANULOCYTES # BLD: 0 X10E3/UL (ref 0–0.1)
IMM GRANULOCYTES NFR BLD: 0 %
IRON SATN MFR SERPL: 9 % (ref 15–55)
IRON SERPL-MCNC: 35 UG/DL (ref 27–159)
LYMPHOCYTES # BLD AUTO: 1.2 X10E3/UL (ref 0.7–3.1)
LYMPHOCYTES NFR BLD AUTO: 21 %
MCH RBC QN AUTO: 23.2 PG (ref 26.6–33)
MCHC RBC AUTO-ENTMCNC: 31.8 G/DL (ref 31.5–35.7)
MCV RBC AUTO: 73 FL (ref 79–97)
MONOCYTES # BLD AUTO: 0.5 X10E3/UL (ref 0.1–0.9)
MONOCYTES NFR BLD AUTO: 9 %
NEUTROPHILS # BLD AUTO: 3.9 X10E3/UL (ref 1.4–7)
NEUTROPHILS NFR BLD AUTO: 67 %
PLATELET # BLD AUTO: 320 X10E3/UL (ref 150–450)
RBC # BLD AUTO: 4.78 X10E6/UL (ref 3.77–5.28)
TIBC SERPL-MCNC: 398 UG/DL (ref 250–450)
UIBC SERPL-MCNC: 363 UG/DL (ref 131–425)
WBC # BLD AUTO: 5.8 X10E3/UL (ref 3.4–10.8)

## 2023-07-17 ENCOUNTER — TELEPHONE (OUTPATIENT)
Dept: FAMILY MEDICINE CLINIC | Facility: CLINIC | Age: 58
End: 2023-07-17

## 2023-07-17 NOTE — TELEPHONE ENCOUNTER
Patient called stating that she is having very itchy skin but no rash. She stated she has seen Angie Fleming for this in the past, and it was for dry skin. She is wondering if Dr Kiara Carty can call something in for her.      Irvin Curry LPN

## 2023-07-19 ENCOUNTER — OFFICE VISIT (OUTPATIENT)
Dept: FAMILY MEDICINE CLINIC | Facility: CLINIC | Age: 58
End: 2023-07-19
Payer: COMMERCIAL

## 2023-07-19 VITALS
HEART RATE: 97 BPM | WEIGHT: 214 LBS | SYSTOLIC BLOOD PRESSURE: 150 MMHG | OXYGEN SATURATION: 94 % | HEIGHT: 66 IN | RESPIRATION RATE: 16 BRPM | BODY MASS INDEX: 34.39 KG/M2 | TEMPERATURE: 97.7 F | DIASTOLIC BLOOD PRESSURE: 90 MMHG

## 2023-07-19 DIAGNOSIS — F41.9 ANXIETY: ICD-10-CM

## 2023-07-19 DIAGNOSIS — I10 BENIGN ESSENTIAL HYPERTENSION: ICD-10-CM

## 2023-07-19 DIAGNOSIS — L29.9 PRURITUS: ICD-10-CM

## 2023-07-19 DIAGNOSIS — D50.9 IRON DEFICIENCY ANEMIA, UNSPECIFIED IRON DEFICIENCY ANEMIA TYPE: Primary | ICD-10-CM

## 2023-07-19 PROCEDURE — 99214 OFFICE O/P EST MOD 30 MIN: CPT | Performed by: FAMILY MEDICINE

## 2023-07-19 RX ORDER — LORATADINE 10 MG/1
10 TABLET ORAL DAILY
Qty: 90 TABLET | Refills: 1 | Status: SHIPPED | OUTPATIENT
Start: 2023-07-19

## 2023-07-19 RX ORDER — ESCITALOPRAM OXALATE 10 MG/1
10 TABLET ORAL DAILY
Qty: 90 TABLET | Refills: 1 | Status: SHIPPED | OUTPATIENT
Start: 2023-07-19

## 2023-07-19 NOTE — ASSESSMENT & PLAN NOTE
Unclear etiology. She has no rash/skin lesions. Possible allergic reaction vs dry skin vs iron def related. She is also on medications that can cause pruritis such as metoprolol, however she has been on this for years. Recommend an antihistamine and cortisone cream only as needed.  May benefit from dermatologist evaluation or allergist evaluation if symptoms persist.

## 2023-07-19 NOTE — PROGRESS NOTES
Name: Andree Nathan      : 1965      MRN: [id number]  Encounter Provider: Chaparrita Arteaga MD  Encounter Date: 2023   Encounter department: [address] [place] [address] [place]     1. Iron deficiency anemia, unspecified iron deficiency anemia type  Comments:  Recommend iron rich foods and starting once daily iron supplement. Will repeat labs. Orders:  -     CBC and Platelet; Future  -     Iron Panel (Includes Ferritin, Iron Sat%, Iron, and TIBC); Future    2. Benign essential hypertension  Comments:  BP slightly elevated in office today. Her metoprolol is refilled. Recommend lifestyle changes such as diet/exercise. Orders:  -     metoprolol tartrate (LOPRESSOR) 25 mg tablet; Take 1 tablet (25 mg total) by mouth daily    3. Anxiety  Comments:  Continue lexapro. Orders:  -     escitalopram (LEXAPRO) 10 mg tablet; Take 1 tablet (10 mg total) by mouth daily    4. Pruritus  Assessment & Plan:  Unclear etiology. She has no rash/skin lesions. Possible allergic reaction vs dry skin vs iron def related. She is also on medications that can cause pruritis such as metoprolol, however she has been on this for years. Recommend an antihistamine and cortisone cream only as needed. May benefit from dermatologist evaluation or allergist evaluation if symptoms persist.     Orders:  -     Food Allergy Profile; Future  -     [facility] Allergy Panel, Adult; Future  -     loratadine (CLARITIN) 10 mg tablet; Take 1 tablet (10 mg total) by mouth daily           Subjective      HPI   She has chronic pruritis throughout her entire body which began over a year ago, recently worsening. Has been using cortisone cream which helps. Also had blood work done which showed low iron levels. Review of Systems   Constitutional: Negative. HENT: Negative. Eyes: Negative. Respiratory: Negative. Cardiovascular: Negative. Gastrointestinal: Negative. Endocrine: Negative. Genitourinary: Negative. Musculoskeletal: Negative. Skin: Negative. pruritis   Allergic/Immunologic: Negative. Neurological: Negative. Hematological: Negative. Psychiatric/Behavioral: Negative. Current Outpatient Medications on File Prior to Visit   Medication Sig   • calcium carbonate (TUMS) 500 mg chewable tablet Chew 1 tablet daily   • calcium polycarbophil (FIBERCON) 625 mg tablet Take 625 mg by mouth daily   • [DISCONTINUED] escitalopram (LEXAPRO) 10 mg tablet Take 1 tablet (10 mg total) by mouth daily   • [DISCONTINUED] metoprolol tartrate (LOPRESSOR) 25 mg tablet TAKE 1 TABLET DAILY       Objective     /90 (BP Location: Left arm, Patient Position: Sitting, Cuff Size: Large)   Pulse 97   Temp 97.7 °F (36.5 °C) (Temporal)   Resp 16   Ht 5' 6" (1.676 m)   Wt 97.1 kg (214 lb)   SpO2 94%   BMI 34.54 kg/m²     Physical Exam  Constitutional:       General: She is not in acute distress. Appearance: She is well-developed. She is not diaphoretic. HENT:      Head: Normocephalic and atraumatic. Cardiovascular:      Rate and Rhythm: Normal rate and regular rhythm. Heart sounds: Normal heart sounds. No murmur heard. No friction rub. No gallop. Pulmonary:      Effort: Pulmonary effort is normal. No respiratory distress. Breath sounds: Normal breath sounds. No wheezing or rales. Chest:      Chest wall: No tenderness. Musculoskeletal:         General: No deformity. Normal range of motion. Cervical back: Normal range of motion and neck supple. Skin:     General: Skin is warm and dry. Coloration: Skin is not jaundiced or pale. Findings: No bruising, erythema, lesion or rash. Neurological:      Mental Status: She is alert and oriented to person, place, and time. Psychiatric:         Behavior: Behavior normal.         Thought Content:  Thought content normal.         Judgment: Judgment normal.       Poonam Garcia MD

## 2023-08-18 ENCOUNTER — TELEPHONE (OUTPATIENT)
Dept: FAMILY MEDICINE CLINIC | Facility: CLINIC | Age: 58
End: 2023-08-18

## 2023-08-18 DIAGNOSIS — Z20.7 SCABIES EXPOSURE: Primary | ICD-10-CM

## 2023-08-18 RX ORDER — PERMETHRIN 50 MG/G
CREAM TOPICAL ONCE
Qty: 60 G | Refills: 1 | Status: SHIPPED | OUTPATIENT
Start: 2023-08-18 | End: 2023-08-18

## 2023-08-18 NOTE — TELEPHONE ENCOUNTER
diagnosed with scabies and Patient would like a wash or a cream for herself for preventive.   Please call Alissa back    Thank you

## 2023-10-11 DIAGNOSIS — L29.9 PRURITUS: ICD-10-CM

## 2023-10-11 RX ORDER — LORATADINE 10 MG/1
10 TABLET ORAL DAILY
Qty: 90 TABLET | Refills: 1 | Status: SHIPPED | OUTPATIENT
Start: 2023-10-11

## 2023-11-03 DIAGNOSIS — I10 BENIGN ESSENTIAL HYPERTENSION: ICD-10-CM

## 2023-11-03 DIAGNOSIS — F41.9 ANXIETY: ICD-10-CM

## 2023-11-03 RX ORDER — ESCITALOPRAM OXALATE 10 MG/1
10 TABLET ORAL DAILY
Qty: 90 TABLET | Refills: 1 | Status: SHIPPED | OUTPATIENT
Start: 2023-11-03

## 2023-12-15 ENCOUNTER — TELEPHONE (OUTPATIENT)
Age: 58
End: 2023-12-15

## 2023-12-15 NOTE — TELEPHONE ENCOUNTER
Pt is going for blood work this Monday asked to have labs sent to [address] [place] [address]  in [facility].   Faxed to [phone]

## 2023-12-19 LAB
A ALTERNATA IGE QN: <0.1 KU/L
A FUMIGATUS IGE QN: <0.1 KU/L
BASOPHILS # BLD AUTO: 0 X10E3/UL (ref 0–0.2)
BASOPHILS NFR BLD AUTO: 1 %
BERMUDA GRASS IGE QN: <0.1 KU/L
BOXELDER IGE QN: <0.1 KU/L
C HERBARUM IGE QN: <0.1 KU/L
CAT DANDER IGE QN: <0.1 KU/L
CMN PIGWEED IGE QN: <0.1 KU/L
CODFISH IGE QN: <0.1 KU/L
COMMON RAGWEED IGE QN: <0.1 KU/L
COTTONWOOD IGE QN: <0.1 KU/L
COW MILK IGE QN: <0.1 KU/L
D FARINAE IGE QN: 0.97 KU/L
D PTERONYSS IGE QN: 0.49 KU/L
DOG DANDER IGE QN: 0.34 KU/L
EGG WHITE IGE QN: <0.1 KU/L
EOSINOPHIL # BLD AUTO: 0.1 X10E3/UL (ref 0–0.4)
EOSINOPHIL NFR BLD AUTO: 2 %
ERYTHROCYTE [DISTWIDTH] IN BLOOD BY AUTOMATED COUNT: 13.7 % (ref 11.7–15.4)
HCT VFR BLD AUTO: 44.3 % (ref 34–46.6)
HGB BLD-MCNC: 15.2 G/DL (ref 11.1–15.9)
IGE SERPL-ACNC: 14 IU/ML (ref 6–495)
IMM GRANULOCYTES # BLD: 0 X10E3/UL (ref 0–0.1)
IMM GRANULOCYTES NFR BLD: 0 %
LONDON PLANE IGE QN: <0.1 KU/L
LYMPHOCYTES # BLD AUTO: 1.3 X10E3/UL (ref 0.7–3.1)
LYMPHOCYTES NFR BLD AUTO: 25 %
Lab: ABNORMAL
MCH RBC QN AUTO: 29.9 PG (ref 26.6–33)
MCHC RBC AUTO-ENTMCNC: 34.3 G/DL (ref 31.5–35.7)
MCV RBC AUTO: 87 FL (ref 79–97)
MONOCYTES # BLD AUTO: 0.4 X10E3/UL (ref 0.1–0.9)
MONOCYTES NFR BLD AUTO: 7 %
MOUSE URINE PROT IGE QN: <0.1 KU/L
MT JUNIPER IGE QN: <0.1 KU/L
MUGWORT IGE QN: <0.1 KU/L
NEUTROPHILS # BLD AUTO: 3.3 X10E3/UL (ref 1.4–7)
NEUTROPHILS NFR BLD AUTO: 65 %
PEANUT (RARA H) 6 IGE QN: <0.1 KU/L
PENICILLIUM CHRYSOGENUM: <0.1 KU/L
PLATELET # BLD AUTO: 239 X10E3/UL (ref 150–450)
RBC # BLD AUTO: 5.08 X10E6/UL (ref 3.77–5.28)
ROACH IGE QN: <0.1 KU/L
SHEEP SORREL IGE QN: <0.1 KU/L
SILVER BIRCH IGE QN: <0.1 KU/L
SOYBEAN IGE QN: <0.1 KU/L
TIMOTHY IGE QN: 0.35 KU/L
WALNUT IGE: <0.1 KU/L
WBC # BLD AUTO: 5 X10E3/UL (ref 3.4–10.8)
WHEAT IGE QN: <0.1 KU/L
WHITE ASH IGE QN: <0.1 KU/L
WHITE ELM IGE QN: <0.1 KU/L
WHITE MULBERRY IGE QN: <0.1 KU/L
WHITE OAK IGE QN: <0.1 KU/L

## 2023-12-26 ENCOUNTER — OFFICE VISIT (OUTPATIENT)
Dept: FAMILY MEDICINE CLINIC | Facility: CLINIC | Age: 58
End: 2023-12-26
Payer: COMMERCIAL

## 2023-12-26 VITALS
DIASTOLIC BLOOD PRESSURE: 84 MMHG | HEIGHT: 66 IN | WEIGHT: 213 LBS | BODY MASS INDEX: 34.23 KG/M2 | TEMPERATURE: 98.6 F | OXYGEN SATURATION: 97 % | RESPIRATION RATE: 18 BRPM | HEART RATE: 86 BPM | SYSTOLIC BLOOD PRESSURE: 136 MMHG

## 2023-12-26 DIAGNOSIS — Z00.00 ANNUAL PHYSICAL EXAM: Primary | ICD-10-CM

## 2023-12-26 DIAGNOSIS — I10 BENIGN ESSENTIAL HYPERTENSION: ICD-10-CM

## 2023-12-26 DIAGNOSIS — Z78.0 ASYMPTOMATIC POSTMENOPAUSAL STATE: ICD-10-CM

## 2023-12-26 DIAGNOSIS — F41.9 ANXIETY: ICD-10-CM

## 2023-12-26 DIAGNOSIS — J06.9 VIRAL UPPER RESPIRATORY TRACT INFECTION: ICD-10-CM

## 2023-12-26 PROCEDURE — 99396 PREV VISIT EST AGE 40-64: CPT | Performed by: FAMILY MEDICINE

## 2023-12-26 RX ORDER — ESCITALOPRAM OXALATE 10 MG/1
10 TABLET ORAL DAILY
Qty: 90 TABLET | Refills: 1 | Status: SHIPPED | OUTPATIENT
Start: 2023-12-26

## 2023-12-26 NOTE — PROGRESS NOTES
ADULT ANNUAL PHYSICAL  [facility]    NAME: Ashley Long  AGE: 58 y.o. SEX: female  : 1965     DATE: 2023     Assessment and Plan:     Problem List Items Addressed This Visit     Benign essential hypertension    Relevant Medications    metoprolol tartrate (LOPRESSOR) 25 mg tablet    Other Relevant Orders    CBC    Comprehensive metabolic panel    Lipid Panel with Direct LDL reflex   Other Visit Diagnoses     Annual physical exam    -  Primary    Asymptomatic postmenopausal state        Relevant Orders    DXA bone density spine hip and pelvis    Viral upper respiratory tract infection        supportive measures reviewed, seems better today.  will contact the office if she doesn't improve    Anxiety        Continue lexapro.     Relevant Medications    escitalopram (LEXAPRO) 10 mg tablet            Immunizations and preventive care screenings were discussed with patient today. Appropriate education was printed on patient's after visit summary.    Counseling:  Dental Health: discussed importance of regular tooth brushing, flossing, and dental visits.  Exercise: the importance of regular exercise/physical activity was discussed. Recommend exercise 3-5 times per week for at least 30 minutes.     Declined flu vaccine - not feeling well.        Return in about 6 months (around 2024) for Next scheduled follow up.     Chief Complaint:     Chief Complaint   Patient presents with   • Physical Exam     [id number]      History of Present Illness:     Adult Annual Physical   Patient here for a comprehensive physical exam.     She was sick 2 weeks ago and it went away.  Then she was exposed to bronchitis by her sister last week. Then she started feeling sick again yesterday.     Diet and Physical Activity  Diet/Nutrition: well balanced diet.   Exercise: no formal exercise.      Depression Screening  PHQ-2/9 Depression Screening    Little interest or pleasure in  doing things: 0 - not at all  Feeling down, depressed, or hopeless: 0 - not at all  Trouble falling or staying asleep, or sleeping too much: 0 - not at all  Feeling tired or having little energy: 0 - not at all  Poor appetite or overeatin - not at all  Feeling bad about yourself - or that you are a failure or have let yourself or your family down: 0 - not at all  Trouble concentrating on things, such as reading the newspaper or watching television: 0 - not at all  Moving or speaking so slowly that other people could have noticed. Or the opposite - being so fidgety or restless that you have been moving around a lot more than usual: 0 - not at all  Thoughts that you would be better off dead, or of hurting yourself in some way: 0 - not at all  PHQ-9 Score: 0   PHQ-9 Interpretation: No or Minimal depression        General Health  Sleep: sleeps well.   Hearing: normal - bilateral.  Vision: no vision problems.   Dental: regular dental visits.       /GYN Health  Follows with gynecology? no   Patient is: postmenopausal      Advanced Care Planning  Do you have an advanced directive? no  Do you have a durable medical power of ? no     Review of Systems:     Review of Systems   Constitutional:  Positive for chills and fever.   HENT:  Positive for sinus pain.    Respiratory:  Positive for cough (improving).       Past Medical History:     Past Medical History:   Diagnosis Date   • Anal fissure    • Anxiety    • Depression    • History of snoring    • Hypertension    • PONV (postoperative nausea and vomiting)       Past Surgical History:     Past Surgical History:   Procedure Laterality Date   •  SECTION     • CHOLECYSTECTOMY     • GASTRIC BYPASS     • NE COLONOSCOPY FLX DX W/COLLJ SPEC WHEN PFRMD N/A 10/5/2018    Procedure: COLONOSCOPY;  Surgeon: GIANNI Blake MD;  [facility];  Service: Colorectal   • NE OPTX DSTL RADL I-ARTIC FX/EPIPHYSL SEP 3 FRAG Left 2021    Procedure: OPEN  REDUCTION W/ INTERNAL FIXATION (ORIF) RADIUS / ULNA (WRIST);  Surgeon: Alfredo Gilmore DO;  [facility];  Service: Orthopedics   • MT REMOVAL IMPLANT DEEP Left 9/1/2021    Procedure: REMOVAL HARDWARE RADIUS / ULNA (WRIST);  Surgeon: Alfredo Gilmore DO;  [facility];  Service: Orthopedics   • MT SURG TX ANAL FISTULA SUBQ N/A 2/15/2019    Procedure: FISTULOTOMY, EUA, Fissurectomy, removal of external hemorrhoids;  Surgeon: GIANNI Blake MD;  Location:  [facility];  Service: Colorectal   • MT SYNOVECTOMY EXTENSOR TENDON SHTH WRIST 1 CMPRT Left 9/1/2021    Procedure: TENOSYNOVECTOMY;  Surgeon: Alfredo Gilmore DO;  [facility];  Service: Orthopedics   • WISDOM TOOTH EXTRACTION        Social History:     Social History     Socioeconomic History   • Marital status: /Civil Union     Spouse name: None   • Number of children: None   • Years of education: None   • Highest education level: None   Occupational History   • None   Tobacco Use   • Smoking status: Never     Passive exposure: Never   • Smokeless tobacco: Never   Vaping Use   • Vaping status: Never Used   Substance and Sexual Activity   • Alcohol use: Yes     Comment: Socially   • Drug use: No   • Sexual activity: None   Other Topics Concern   • None   Social History Narrative   • None     Social Determinants of Health     Financial Resource Strain: Not on file   Food Insecurity: Not on file   Transportation Needs: Not on file   Physical Activity: Not on file   Stress: Not on file   Social Connections: Not on file   Intimate Partner Violence: Not on file   Housing Stability: Not on file      Family History:     Family History   Problem Relation Age of Onset   • Hypertension Mother    • Hypertension Father    • Other Father         Renal failure   • Stomach cancer Father    • Heart disease Father    • No Known Problems Maternal Aunt    • No Known Problems Maternal Aunt    • No Known Problems Maternal Aunt    • No Known Problems  "Maternal Aunt    • No Known Problems Paternal Aunt    • No Known Problems Paternal Aunt    • No Known Problems Paternal Aunt       Current Medications:     Current Outpatient Medications   Medication Sig Dispense Refill   • calcium carbonate (TUMS) 500 mg chewable tablet Chew 1 tablet daily     • calcium polycarbophil (FIBERCON) 625 mg tablet Take 625 mg by mouth daily     • escitalopram (LEXAPRO) 10 mg tablet Take 1 tablet (10 mg total) by mouth daily 90 tablet 1   • metoprolol tartrate (LOPRESSOR) 25 mg tablet Take 1 tablet (25 mg total) by mouth daily 90 tablet 1     No current facility-administered medications for this visit.      Allergies:     No Known Allergies   Physical Exam:     /84   Pulse 86   Temp 98.6 °F (37 °C)   Resp 18   Ht 5' 6\" (1.676 m)   Wt 96.6 kg (213 lb)   SpO2 97%   BMI 34.38 kg/m²     Physical Exam  Vitals and nursing note reviewed.   Constitutional:       General: She is not in acute distress.     Appearance: She is well-developed.   HENT:      Head: Normocephalic and atraumatic.      Right Ear: External ear normal.      Left Ear: External ear normal.      Nose: Nose normal.   Eyes:      Conjunctiva/sclera: Conjunctivae normal.   Cardiovascular:      Rate and Rhythm: Normal rate and regular rhythm.      Heart sounds: Normal heart sounds. No murmur heard.     No friction rub.   Pulmonary:      Effort: Pulmonary effort is normal. No respiratory distress.      Breath sounds: Normal breath sounds. No wheezing or rales.   Abdominal:      Palpations: Abdomen is soft.      Tenderness: There is no abdominal tenderness.   Musculoskeletal:         General: No swelling.      Cervical back: Neck supple.      Right lower leg: No edema.      Left lower leg: No edema.   Skin:     General: Skin is warm and dry.      Capillary Refill: Capillary refill takes less than 2 seconds.   Neurological:      Mental Status: She is alert and oriented to person, place, and time.      Cranial Nerves: No " cranial nerve deficit.   Psychiatric:         Mood and Affect: Mood normal.          Marita Franco, [facility]

## 2024-02-09 DIAGNOSIS — F41.9 ANXIETY: ICD-10-CM

## 2024-02-09 DIAGNOSIS — I10 BENIGN ESSENTIAL HYPERTENSION: ICD-10-CM

## 2024-02-09 RX ORDER — ESCITALOPRAM OXALATE 10 MG/1
10 TABLET ORAL DAILY
Qty: 90 TABLET | Refills: 1 | OUTPATIENT
Start: 2024-02-09

## 2024-03-25 ENCOUNTER — HOSPITAL ENCOUNTER (OUTPATIENT)
Dept: RADIOLOGY | Facility: HOSPITAL | Age: 59
Discharge: HOME | End: 2024-03-25
Attending: FAMILY MEDICINE
Payer: COMMERCIAL

## 2024-03-25 DIAGNOSIS — Z12.31 ENCOUNTER FOR SCREENING MAMMOGRAM FOR MALIGNANT NEOPLASM OF BREAST: ICD-10-CM

## 2024-03-25 PROCEDURE — 77067 SCR MAMMO BI INCL CAD: CPT

## 2024-03-25 PROCEDURE — 77063 BREAST TOMOSYNTHESIS BI: CPT

## 2024-05-13 DIAGNOSIS — F41.9 ANXIETY: ICD-10-CM

## 2024-05-13 DIAGNOSIS — I10 BENIGN ESSENTIAL HYPERTENSION: ICD-10-CM

## 2024-05-14 RX ORDER — ESCITALOPRAM OXALATE 10 MG/1
10 TABLET ORAL DAILY
Qty: 90 TABLET | Refills: 1 | Status: SHIPPED | OUTPATIENT
Start: 2024-05-14

## 2024-06-21 LAB
ALBUMIN SERPL-MCNC: 4.1 G/DL (ref 3.9–4.9)
ALP SERPL-CCNC: 86 IU/L (ref 44–121)
ALT SERPL-CCNC: 21 IU/L (ref 0–32)
AST SERPL-CCNC: 22 IU/L (ref 0–40)
BILIRUB SERPL-MCNC: 0.3 MG/DL (ref 0–1.2)
BUN SERPL-MCNC: 12 MG/DL (ref 8–27)
BUN/CREAT SERPL: 14 (ref 12–28)
CALCIUM SERPL-MCNC: 9.1 MG/DL (ref 8.7–10.3)
CHLORIDE SERPL-SCNC: 106 MMOL/L (ref 96–106)
CHOLEST SERPL-MCNC: 211 MG/DL (ref 100–199)
CO2 SERPL-SCNC: 22 MMOL/L (ref 20–29)
CREAT SERPL-MCNC: 0.84 MG/DL (ref 0.57–1)
EGFR: 79 ML/MIN/1.73
ERYTHROCYTE [DISTWIDTH] IN BLOOD BY AUTOMATED COUNT: 13.1 % (ref 11.7–15.4)
GLOBULIN SER-MCNC: 2 G/DL (ref 1.5–4.5)
GLUCOSE SERPL-MCNC: 98 MG/DL (ref 70–99)
HCT VFR BLD AUTO: 42.1 % (ref 34–46.6)
HDLC SERPL-MCNC: 55 MG/DL
HGB BLD-MCNC: 14.2 G/DL (ref 11.1–15.9)
LDLC SERPL CALC-MCNC: 135 MG/DL (ref 0–99)
LDLC/HDLC SERPL: 2.5 RATIO (ref 0–3.2)
MCH RBC QN AUTO: 30.5 PG (ref 26.6–33)
MCHC RBC AUTO-ENTMCNC: 33.7 G/DL (ref 31.5–35.7)
MCV RBC AUTO: 91 FL (ref 79–97)
MICRODELETION SYND BLD/T FISH: NORMAL
PLATELET # BLD AUTO: 217 X10E3/UL (ref 150–450)
POTASSIUM SERPL-SCNC: 4.1 MMOL/L (ref 3.5–5.2)
PROT SERPL-MCNC: 6.1 G/DL (ref 6–8.5)
RBC # BLD AUTO: 4.65 X10E6/UL (ref 3.77–5.28)
SL AMB VLDL CHOLESTEROL CALC: 21 MG/DL (ref 5–40)
SODIUM SERPL-SCNC: 142 MMOL/L (ref 134–144)
TRIGL SERPL-MCNC: 119 MG/DL (ref 0–149)
WBC # BLD AUTO: 4.4 X10E3/UL (ref 3.4–10.8)

## 2024-06-24 ENCOUNTER — RA CDI HCC (OUTPATIENT)
Dept: OTHER | Facility: HOSPITAL | Age: 59
End: 2024-06-24

## 2024-06-24 NOTE — PROGRESS NOTES
HCC coding opportunities       Chart reviewed, no opportunity found: CHART REVIEWED, NO OPPORTUNITY FOUND        Patients Insurance        Commercial Insurance: [employer]

## 2024-07-01 ENCOUNTER — OFFICE VISIT (OUTPATIENT)
Dept: FAMILY MEDICINE CLINIC | Facility: CLINIC | Age: 59
End: 2024-07-01
Payer: COMMERCIAL

## 2024-07-01 ENCOUNTER — TELEPHONE (OUTPATIENT)
Dept: FAMILY MEDICINE CLINIC | Facility: CLINIC | Age: 59
End: 2024-07-01

## 2024-07-01 VITALS
RESPIRATION RATE: 18 BRPM | TEMPERATURE: 97.8 F | HEART RATE: 86 BPM | WEIGHT: 212 LBS | OXYGEN SATURATION: 98 % | HEIGHT: 66 IN | BODY MASS INDEX: 34.07 KG/M2 | DIASTOLIC BLOOD PRESSURE: 86 MMHG | SYSTOLIC BLOOD PRESSURE: 134 MMHG

## 2024-07-01 DIAGNOSIS — F41.9 ANXIETY: ICD-10-CM

## 2024-07-01 DIAGNOSIS — E66.09 CLASS 1 OBESITY DUE TO EXCESS CALORIES WITH SERIOUS COMORBIDITY AND BODY MASS INDEX (BMI) OF 34.0 TO 34.9 IN ADULT: ICD-10-CM

## 2024-07-01 DIAGNOSIS — E78.2 MIXED HYPERLIPIDEMIA: ICD-10-CM

## 2024-07-01 DIAGNOSIS — I10 BENIGN ESSENTIAL HYPERTENSION: Primary | ICD-10-CM

## 2024-07-01 PROBLEM — E66.811 CLASS 1 OBESITY DUE TO EXCESS CALORIES WITH SERIOUS COMORBIDITY AND BODY MASS INDEX (BMI) OF 34.0 TO 34.9 IN ADULT: Status: ACTIVE | Noted: 2024-07-01

## 2024-07-01 PROCEDURE — 99214 OFFICE O/P EST MOD 30 MIN: CPT | Performed by: FAMILY MEDICINE

## 2024-07-01 NOTE — ASSESSMENT & PLAN NOTE
Has been using semaglutide oral version  Risk and benefits of medication discussed  I explained that the version of that that I would prescribe that is FDA approved for weight loss is Wegovy.  She is going to think about trying the injectable and see how she does with the oral version.  She is also to check with her insurance regarding coverage.

## 2024-07-01 NOTE — PROGRESS NOTES
"Ambulatory Visit  Name: Ashley Long      : 1965      MRN: [id number]  Encounter Provider: Marita Franco DO  Encounter Date: 2024   Encounter department: [facility]    Assessment & Plan   1. Benign essential hypertension  Assessment & Plan:  Stable  Continue metoprolol 25 mg daily  Orders:  -     CBC; Future; Expected date: 2024  -     Comprehensive metabolic panel; Future; Expected date: 2024  -     Lipid Panel with Direct LDL reflex; Future; Expected date: 2024  2. Mixed hyperlipidemia  Assessment & Plan:  Improved with diet  Will continue to monitor  Orders:  -     Lipid Panel with Direct LDL reflex; Future; Expected date: 2024  3. Anxiety  Assessment & Plan:  Stable  Continue lexapro 10 mg a day   4. Class 1 obesity due to excess calories with serious comorbidity and body mass index (BMI) of 34.0 to 34.9 in adult  Assessment & Plan:  Has been using semaglutide oral version  Risk and benefits of medication discussed  I explained that the version of that that I would prescribe that is FDA approved for weight loss is Wegovy.  She is going to think about trying the injectable and see how she does with the oral version.  She is also to check with her insurance regarding coverage.    Return in about 6 months (around 2025) for Annual physical.     History of Present Illness     Patient ports that her anxiety has been controlled well with her medication.    Her biggest concern has been her obesity.  She has been getting oral semaglutide from an online provider.  She does started the lowest dose and has only lost a few pounds.  She is can be starting the higher dose soon.        Review of Systems    Objective     /86   Pulse 86   Temp 97.8 °F (36.6 °C)   Resp 18   Ht 5' 6\" (1.676 m)   Wt 96.2 kg (212 lb)   SpO2 98%   BMI 34.22 kg/m²     Physical Exam  Vitals and nursing note reviewed.   Constitutional:       General: She is not in acute distress.     " Appearance: She is well-developed.   HENT:      Head: Normocephalic and atraumatic.      Right Ear: Tympanic membrane normal.      Left Ear: Tympanic membrane normal.   Cardiovascular:      Rate and Rhythm: Normal rate and regular rhythm.      Heart sounds: No murmur heard.  Pulmonary:      Effort: Pulmonary effort is normal. No respiratory distress.      Breath sounds: Normal breath sounds.   Musculoskeletal:         General: No swelling.      Cervical back: Neck supple.   Neurological:      Mental Status: She is alert.       Administrative Statements

## 2024-08-16 DIAGNOSIS — F41.9 ANXIETY: ICD-10-CM

## 2024-08-16 DIAGNOSIS — I10 BENIGN ESSENTIAL HYPERTENSION: ICD-10-CM

## 2024-08-16 RX ORDER — METOPROLOL TARTRATE 25 MG/1
25 TABLET, FILM COATED ORAL DAILY
Qty: 90 TABLET | Refills: 1 | OUTPATIENT
Start: 2024-08-16

## 2024-08-16 RX ORDER — ESCITALOPRAM OXALATE 10 MG/1
10 TABLET ORAL DAILY
Qty: 90 TABLET | Refills: 1 | OUTPATIENT
Start: 2024-08-16

## 2024-08-16 NOTE — TELEPHONE ENCOUNTER
Patient called to request refill on Escitalopram 10 mg and Metoprolol 25 mg. [facility] for 90 days

## 2024-11-10 DIAGNOSIS — I10 BENIGN ESSENTIAL HYPERTENSION: ICD-10-CM

## 2024-11-10 DIAGNOSIS — F41.9 ANXIETY: ICD-10-CM

## 2024-11-10 RX ORDER — METOPROLOL TARTRATE 25 MG/1
25 TABLET, FILM COATED ORAL DAILY
Qty: 90 TABLET | Refills: 1 | Status: SHIPPED | OUTPATIENT
Start: 2024-11-10

## 2024-11-10 RX ORDER — ESCITALOPRAM OXALATE 10 MG/1
10 TABLET ORAL DAILY
Qty: 90 TABLET | Refills: 1 | Status: SHIPPED | OUTPATIENT
Start: 2024-11-10

## 2024-12-30 ENCOUNTER — NURSE TRIAGE (OUTPATIENT)
Age: 59
End: 2024-12-30

## 2024-12-30 ENCOUNTER — TELEPHONE (OUTPATIENT)
Age: 59
End: 2024-12-30

## 2024-12-30 NOTE — TELEPHONE ENCOUNTER
Spoke with patient, let her know that if she experience symptoms again to go to ER immediately.  Uriah

## 2024-12-30 NOTE — TELEPHONE ENCOUNTER
"Regarding: symptomatic patient review  ----- Message from Varghese HERNANDEZ sent at 12/30/2024 10:55 AM EST -----  She spoke with the PEP who sent the request but while reviewing i noticed the PEP states the patient had symptoms and \"felt like a heart attack\" Friday night. After speaking with José Manuel he suggested I reach out to CTS and ask if there way any chance someone could reach out to the patient and see if they are still having symptoms since it was such a recent event and to help curb any issues with scheduling from the office.  If they are not having any symptoms please just respond to Varghese so i can forward to the appropriate office. if they are then please follow your CTS protocols    "

## 2024-12-30 NOTE — TELEPHONE ENCOUNTER
"Pt called stating that Friday night while lying in bed to go to sleep she started with a tightening discomfort under her breasts across chest. She also had cold sweats. She states she did not want to take a deep breath because of the discomfort.  She states she took 2 tylenol which did help a little but she then took 2 tums and then symptoms went away. She states that symptoms lasted about 45mins. Pt requested to be seen by Dr. Green or Dr. Pierce. Scheduled pt for an appointment on 2/18/25 with Dr. Green-urgent new patient. Advised pt if symptoms return she should go to ED-pt verbalized understanding. She is also going to see her PCP on 1/2/25.  Answer Assessment - Initial Assessment Questions  1. LOCATION: \"Where does it hurt?\"        Under both breast across  2. RADIATION: \"Does the pain go anywhere else?\" (e.g., into neck, jaw, arms, back)      Denies   3. ONSET: \"When did the chest pain begin?\" (Minutes, hours or days)       Friday night   4. PATTERN: \"Does the pain come and go, or has it been constant since it started?\"  \"Does it get worse with exertion?\"       Came and went   5. DURATION: \"How long does it last\" (e.g., seconds, minutes, hours)      45 mins   6. SEVERITY: \"How bad is the pain?\"  (e.g., Scale 1-10; mild, moderate, or severe)      8 pain level   7. CARDIAC RISK FACTORS: \"Do you have any history of heart problems or risk factors for heart disease?\" (e.g., angina, prior heart attack; diabetes, high blood pressure, high cholesterol, smoker, or strong family history of heart disease)      Hypertension , high cholesterol but coming down  8. PULMONARY RISK FACTORS: \"Do you have any history of lung disease?\"  (e.g., blood clots in lung, asthma, emphysema, birth control pills)      Younger did take birth control  9. CAUSE: \"What do you think is causing the chest pain?\"      Unsure   10. OTHER SYMPTOMS: \"Do you have any other symptoms?\" (e.g., dizziness, nausea, vomiting, sweating, fever, difficulty " breathing, cough)        Denies    Protocols used: Chest Pain-Adult-[place]

## 2024-12-30 NOTE — TELEPHONE ENCOUNTER
"Patient called in with concerns over episode 12/27 around 3:00 AM. States had episode of chest tightness across her breasts that was an 8/10. At that time patient also reports being sweaty and having a loose BM. Patient reports she sat up in recliner and took 2 tylenol followed by a Tums several minutes later and reports sensation went away and has not returned. Total episode lasted about 45 mins. Patient has hx gastric bypass and states she had issues with \"abrasions\" that caused similar feelings. Also has hx of cholecystectomy. Patient called cardiology and requested to be seen. OV with cardiology 3/20/25. No OV available today with PCP. Has OV already scheduled 1/2/25  with Denisha SNYDER at 2:45. Patient aware if sensation returns or anything changes she needs to go to ER.     Reason for Disposition   All other patients with chest pain (Exception: Fleeting chest pain lasting a few seconds.)    Answer Assessment - Initial Assessment Questions  1. LOCATION: \"Where does it hurt?\"        Across the breast, in center  8/10  2. RADIATION: \"Does the pain go anywhere else?\" (e.g., into neck, jaw, arms, back)      Denies   3. ONSET: \"When did the chest pain begin?\" (Minutes, hours or days)       Friday 3 AM  4. PATTERN: \"Does the pain come and go, or has it been constant since it started?\"  \"Does it get worse with exertion?\"       Denies   5. DURATION: \"How long does it last\" (e.g., seconds, minutes, hours)      Several minutes   6. SEVERITY: \"How bad is the pain?\"  (e.g., Scale 1-10; mild, moderate, or severe)      8/10 during episode Friday early AM (0300)  7. CARDIAC RISK FACTORS: \"Do you have any history of heart problems or risk factors for heart disease?\" (e.g., angina, prior heart attack; diabetes, high blood pressure, high cholesterol, smoker, or strong family history of heart disease)      HTN  8. PULMONARY RISK FACTORS: \"Do you have any history of lung disease?\"  (e.g., blood clots in lung, asthma, " "emphysema, birth control pills)      Denies   9. CAUSE: \"What do you think is causing the chest pain?\"      Hx of gastric bypass and \"abrasions\" that caused similar sensation at the time,  and gall bladder removal   10. OTHER SYMPTOMS: \"Do you have any other symptoms?\" (e.g., dizziness, nausea, vomiting, sweating, fever, difficulty breathing, cough)        Friday 12/27 patient had experience of chest tightness  across the breast 8/10, had episode of lose stool, took 2 Tylenol and sat in recliner then took a Tums and sensation went away  11. PREGNANCY: \"Is there any chance you are pregnant?\" \"When was your last menstrual period?\"        NA    Protocols used: Chest Pain-Adult-[place]    "

## 2024-12-30 NOTE — TELEPHONE ENCOUNTER
"Hello,    The following message was sent via e-mail to the leadership team:     Please advise if you can help facilitate the following overbook request:    Patient Name: Alissa Long    Patient MRN: [id number]     Call back #: [phone]     Insurance: [employer]    Department:Cardiology    Speciality: General Cardiology    Reason for overbook request: PATIENT REQUEST    Comments (Write \"N/a\" if no comments): She had an incident Friday night, she thought she was having a heart attack, cold sweats, pain under her breast and nausea and diarrhea. It lasted about 45 minutes. She very worried and would like to be seen by Dr Green as soon as she can.      Requested doctor and location: [facility]    Date of current appointment: 3/20/2025      Thank you.       "

## 2025-01-02 ENCOUNTER — OFFICE VISIT (OUTPATIENT)
Dept: FAMILY MEDICINE CLINIC | Facility: CLINIC | Age: 60
End: 2025-01-02
Payer: COMMERCIAL

## 2025-01-02 VITALS
SYSTOLIC BLOOD PRESSURE: 150 MMHG | WEIGHT: 214 LBS | DIASTOLIC BLOOD PRESSURE: 88 MMHG | RESPIRATION RATE: 16 BRPM | TEMPERATURE: 97.1 F | HEART RATE: 78 BPM | BODY MASS INDEX: 34.39 KG/M2 | HEIGHT: 66 IN

## 2025-01-02 DIAGNOSIS — G47.33 OBSTRUCTIVE SLEEP APNEA: Primary | ICD-10-CM

## 2025-01-02 DIAGNOSIS — R07.9 CHEST PAIN, UNSPECIFIED TYPE: ICD-10-CM

## 2025-01-02 DIAGNOSIS — E78.2 MIXED HYPERLIPIDEMIA: ICD-10-CM

## 2025-01-02 DIAGNOSIS — I10 BENIGN ESSENTIAL HYPERTENSION: ICD-10-CM

## 2025-01-02 PROCEDURE — 99214 OFFICE O/P EST MOD 30 MIN: CPT | Performed by: NURSE PRACTITIONER

## 2025-01-02 NOTE — ASSESSMENT & PLAN NOTE
LDL elevated.  Discussed CT coronary calcium score.  She will discuss with cardiology.  Orders:  •  CT coronary calcium score; Future

## 2025-01-02 NOTE — PROGRESS NOTES
Name: Ashley Long      : 1965      MRN: [id number]  Encounter Provider: CLAUDIO Crockett  Encounter Date: 2025   Encounter department: [facility]  :  Assessment & Plan  Chest pain, unspecified type  Low suspicion that this event was cardiac related.  Suspect indigestion/GERD related to weight intake, especially considering this resolved after taking Tums.  Orders:  •  Echo complete w/ contrast if indicated; Future  •  NM myocardial perfusion spect (rx stress and/or rest); Future  •  CT coronary calcium score; Future    Obstructive sleep apnea  Reports she has not needed CPAP since her bariatric surgery.        Benign essential hypertension  stable       Mixed hyperlipidemia  LDL elevated.  Discussed CT coronary calcium score.  She will discuss with cardiology.  Orders:  •  CT coronary calcium score; Future           History of Present Illness     Patient had an episode of chest pain that occurred in the early hours in the morning last week.  States she went to bed feeling fine, awoke around 4 AM with a pain under her breasts.  Went to the bathroom,  took some tylenol and got back to bed.  She then felt like she needed to vomit or go to the bathroom,  Associated with sweats.  She got up, took 2 tablets, and sat in the chair.  Symptoms resolved entirely after about 20 minutes.  She has not had any recurrent symptoms since that time.  Reports she did have a glass of wine earlier that evening that did not agree with her.  She has not had any exertional symptoms.  She has history of stroke.  Mother had a MI multiple cardiac problems.  She is scheduled to see cardiology as inpatient next month          Review of Systems   Constitutional: Negative.    Respiratory:  Negative for cough, chest tightness and shortness of breath.    Cardiovascular:         See HPI   Gastrointestinal:         See HPI   Neurological:  Negative for dizziness, light-headedness and headaches.       Objective  "  /88   Pulse 78   Temp (!) 97.1 °F (36.2 °C)   Resp 16   Ht 5' 6\" (1.676 m)   Wt 97.1 kg (214 lb)   BMI 34.54 kg/m²      Physical Exam  Vitals and nursing note reviewed.   Constitutional:       General: She is not in acute distress.     Appearance: Normal appearance.   HENT:      Head: Normocephalic and atraumatic.   Eyes:      Conjunctiva/sclera: Conjunctivae normal.   Neck:      Vascular: No carotid bruit.   Cardiovascular:      Rate and Rhythm: Normal rate and regular rhythm.      Pulses: Normal pulses.      Heart sounds: Normal heart sounds. No murmur heard.  Pulmonary:      Effort: Pulmonary effort is normal.      Breath sounds: Normal breath sounds.   Skin:     General: Skin is warm and dry.   Neurological:      Mental Status: She is alert.   Psychiatric:         Mood and Affect: Mood normal.         Behavior: Behavior normal.         "

## 2025-01-05 ENCOUNTER — HOSPITAL ENCOUNTER (OUTPATIENT)
Dept: CT IMAGING | Facility: HOSPITAL | Age: 60
Discharge: HOME | End: 2025-01-05

## 2025-01-05 DIAGNOSIS — E78.2 MIXED HYPERLIPIDEMIA: ICD-10-CM

## 2025-01-05 DIAGNOSIS — R07.9 CHEST PAIN, UNSPECIFIED TYPE: ICD-10-CM

## 2025-01-05 PROCEDURE — 75571 CT HRT W/O DYE W/CA TEST: CPT

## 2025-01-13 ENCOUNTER — TELEPHONE (OUTPATIENT)
Age: 60
End: 2025-01-13

## 2025-01-13 NOTE — TELEPHONE ENCOUNTER
Ge Gray from [facility]'s Procedure auth called to notify Dr. Nick that patient nuclear med study was denied on Saturday by the insurance company. In order for this to possibly be approved a peer to peer needs to be done before noon tomorrow.  Case # case #[id number]     [place] phone number :[phone]    Ge Gray-  [phone]

## 2025-01-13 NOTE — TELEPHONE ENCOUNTER
Don't think a peer to peer is necessary- would recommend she just go for the 2D echo, cancel the stress test, and then after she sees cardiology, they can determine the appropriate stress test for her.  CLAUDIO Crockett

## 2025-01-15 ENCOUNTER — RESULTS FOLLOW-UP (OUTPATIENT)
Dept: FAMILY MEDICINE CLINIC | Facility: CLINIC | Age: 60
End: 2025-01-15

## 2025-01-15 ENCOUNTER — APPOINTMENT (OUTPATIENT)
Dept: NON INVASIVE DIAGNOSTICS | Facility: HOSPITAL | Age: 60
End: 2025-01-15
Payer: COMMERCIAL

## 2025-01-15 ENCOUNTER — HOSPITAL ENCOUNTER (OUTPATIENT)
Dept: NON INVASIVE DIAGNOSTICS | Facility: HOSPITAL | Age: 60
Discharge: HOME | End: 2025-01-15
Payer: COMMERCIAL

## 2025-01-15 VITALS
BODY MASS INDEX: 34.39 KG/M2 | WEIGHT: 214 LBS | HEIGHT: 66 IN | SYSTOLIC BLOOD PRESSURE: 150 MMHG | HEART RATE: 65 BPM | DIASTOLIC BLOOD PRESSURE: 88 MMHG

## 2025-01-15 DIAGNOSIS — R07.9 CHEST PAIN, UNSPECIFIED TYPE: ICD-10-CM

## 2025-01-15 LAB
AORTIC ROOT: 2.9 CM
BSA FOR ECHO PROCEDURE: 2.06 M2
E WAVE DECELERATION TIME: 207 MS
E/A RATIO: 0.75
FRACTIONAL SHORTENING: 28 (ref 28–44)
INTERVENTRICULAR SEPTUM IN DIASTOLE (PARASTERNAL SHORT AXIS VIEW): 1.1 CM
INTERVENTRICULAR SEPTUM: 1.1 CM (ref 0.6–1.1)
LAAS-AP2: 19 CM2
LAAS-AP4: 19.3 CM2
LEFT ATRIUM SIZE: 3.5 CM
LEFT ATRIUM VOLUME (MOD BIPLANE): 63 ML
LEFT ATRIUM VOLUME INDEX (MOD BIPLANE): 30.6 ML/M2
LEFT INTERNAL DIMENSION IN SYSTOLE: 2.8 CM (ref 2.1–4)
LEFT VENTRICULAR INTERNAL DIMENSION IN DIASTOLE: 3.9 CM (ref 3.5–6)
LEFT VENTRICULAR POSTERIOR WALL IN END DIASTOLE: 1.1 CM
LEFT VENTRICULAR STROKE VOLUME: 37 ML
LV EF US.2D.A4C+ESTIMATED: 64 %
LVSV (TEICH): 37 ML
MV E'TISSUE VEL-LAT: 9 CM/S
MV E'TISSUE VEL-SEP: 8 CM/S
MV PEAK A VEL: 1 M/S
MV PEAK E VEL: 75 CM/S
MV STENOSIS PRESSURE HALF TIME: 60 MS
MV VALVE AREA P 1/2 METHOD: 3.67
RIGHT ATRIUM AREA SYSTOLE A4C: 12.1 CM2
RIGHT VENTRICLE ID DIMENSION: 3.2 CM
SL CV LEFT ATRIUM LENGTH A2C: 5.3 CM
SL CV LV EF: 60
SL CV PED ECHO LEFT VENTRICLE DIASTOLIC VOLUME (MOD BIPLANE) 2D: 68 ML
SL CV PED ECHO LEFT VENTRICLE SYSTOLIC VOLUME (MOD BIPLANE) 2D: 30 ML
TRICUSPID ANNULAR PLANE SYSTOLIC EXCURSION: 2.1 CM

## 2025-01-15 PROCEDURE — 93306 TTE W/DOPPLER COMPLETE: CPT

## 2025-01-15 PROCEDURE — 93306 TTE W/DOPPLER COMPLETE: CPT | Performed by: INTERNAL MEDICINE

## 2025-02-07 NOTE — TELEPHONE ENCOUNTER
Patient called requesting refill for Escitalopram 10 mg, Metoprolol 25 mg. Patient made aware medication was refilled on 11/10/24 for 90 with 1 refills to [place] [facility]. Patient instructed to contact the pharmacy to obtain refills of medication. Patient verbalized understanding.

## 2025-02-13 ENCOUNTER — OFFICE VISIT (OUTPATIENT)
Dept: CARDIOLOGY CLINIC | Facility: CLINIC | Age: 60
End: 2025-02-13
Payer: COMMERCIAL

## 2025-02-13 VITALS
WEIGHT: 218 LBS | SYSTOLIC BLOOD PRESSURE: 154 MMHG | HEIGHT: 66 IN | OXYGEN SATURATION: 98 % | HEART RATE: 98 BPM | DIASTOLIC BLOOD PRESSURE: 94 MMHG | BODY MASS INDEX: 35.03 KG/M2

## 2025-02-13 DIAGNOSIS — I11.9 HYPERTENSIVE HEART DISEASE WITHOUT HEART FAILURE: ICD-10-CM

## 2025-02-13 DIAGNOSIS — Z98.84 H/O BARIATRIC SURGERY: ICD-10-CM

## 2025-02-13 DIAGNOSIS — E78.2 MIXED HYPERLIPIDEMIA: ICD-10-CM

## 2025-02-13 DIAGNOSIS — I25.10 CORONARY ARTERY CALCIFICATION: ICD-10-CM

## 2025-02-13 DIAGNOSIS — E66.09 CLASS 1 OBESITY DUE TO EXCESS CALORIES WITH SERIOUS COMORBIDITY AND BODY MASS INDEX (BMI) OF 34.0 TO 34.9 IN ADULT: ICD-10-CM

## 2025-02-13 DIAGNOSIS — G47.33 OBSTRUCTIVE SLEEP APNEA: ICD-10-CM

## 2025-02-13 DIAGNOSIS — F41.9 ANXIETY: ICD-10-CM

## 2025-02-13 DIAGNOSIS — E66.811 CLASS 1 OBESITY DUE TO EXCESS CALORIES WITH SERIOUS COMORBIDITY AND BODY MASS INDEX (BMI) OF 34.0 TO 34.9 IN ADULT: ICD-10-CM

## 2025-02-13 DIAGNOSIS — R07.9 CHEST PAIN, UNSPECIFIED TYPE: ICD-10-CM

## 2025-02-13 PROCEDURE — 99203 OFFICE O/P NEW LOW 30 MIN: CPT | Performed by: INTERNAL MEDICINE

## 2025-02-13 PROCEDURE — 93000 ELECTROCARDIOGRAM COMPLETE: CPT | Performed by: INTERNAL MEDICINE

## 2025-02-13 RX ORDER — NEBIVOLOL 5 MG/1
5 TABLET ORAL DAILY
Qty: 30 TABLET | Refills: 3 | Status: SHIPPED | OUTPATIENT
Start: 2025-02-13

## 2025-02-13 RX ORDER — ATORVASTATIN CALCIUM 10 MG/1
10 TABLET, FILM COATED ORAL EVERY OTHER DAY
Qty: 45 TABLET | Refills: 1 | Status: SHIPPED | OUTPATIENT
Start: 2025-02-13

## 2025-02-13 RX ORDER — METOPROLOL TARTRATE 25 MG/1
25 TABLET, FILM COATED ORAL EVERY 12 HOURS SCHEDULED
Qty: 180 TABLET | Refills: 1 | Status: SHIPPED | OUTPATIENT
Start: 2025-02-13 | End: 2025-02-13

## 2025-02-13 NOTE — ASSESSMENT & PLAN NOTE
Risk for cardiovascular event is around 7 to 7.5%.  Start on Lipitor 10 mg every other day and will check blood test in 6 to 8 weeks.

## 2025-02-13 NOTE — ASSESSMENT & PLAN NOTE
Her blood pressure is not at goal.  Checked by me was a still around 1 50-1 60 systolic.  She does get nervous.  She is trying hard to calm down.  I took the liberty of changing metoprolol to tartrate 25 mg daily to Bystolic 5 mg daily and if blood pressure still elevated we may consider adding ACE inhibitors.

## 2025-02-13 NOTE — PROGRESS NOTES
Consultation - Cardiology Office  [facility].    Ashley Long 59 y.o. female MRN: [id number]  : 1965  Unit/Bed#:  Encounter: [id number]      Assessment:     Assessment & Plan  Chest pain, unspecified type  Patient had episode of chest pain in December.  EKG shows sinus rhythm with few PVCs.  She does get easily anxious.  She will be scheduled for exercise stress test echo Doppler reviewed.  Hypertensive heart disease without heart failure  Her blood pressure is not at goal.  Checked by me was a still around 1 50-1 60 systolic.  She does get nervous.  She is trying hard to calm down.  I took the liberty of changing metoprolol to tartrate 25 mg daily to Bystolic 5 mg daily and if blood pressure still elevated we may consider adding ACE inhibitors.  Coronary artery calcification  She was noted to have mild coronary calcification calcium score was around 50.  She had an episode of chest pain she will be scheduled for exercise stress test as well as we will start her on statin pathophysiology of CAD discussed.  Mixed hyperlipidemia  Risk for cardiovascular event is around 7 to 7.5%.  Start on Lipitor 10 mg every other day and will check blood test in 6 to 8 weeks.  Obstructive sleep apnea  Working on losing weight.  Class 1 obesity due to excess calories with serious comorbidity and body mass index (BMI) of 34.0 to 34.9 in adult  As above.  H/O bariatric surgery - bypass  Patient has bariatric surgery done about 12 years ago.  She follows with medical team  Anxiety  Currently she is on Lexapro.       Discussion summary and Plan:        Patient / Caretaker was advised and educated to call our office  immediately if  patient has any new symptoms of chest pain/shortness of breath, near-syncope, syncope, light headedness sustained palpitations or any other cardiovascular symptoms before their scheduled follow-up appointment.  Office number was provided # [phone]    Thank you for your  consultation.  If you have any question please call me at [phone]    Counseling :  A description of the counseling.  Goals and Barriers.  Patient's ability to self care: Yes  Medication side effect reviewed with patient in detail and all their questions answered to their satisfaction.      Primary Care Physician Requesting Consult: Marita Franco DO    Reason for Consult / Principal Problem: Chest pain and family history of heart problem        HPI :     Ashley Long is a 59 y.o. year old female who was referred by primary care doctor for chest pain and family history of heart disease.  Patient was evaluated by her medical team and had some basic cardiac workup done in the form of echo which shows normal LV systolic function borderline LVH and a nuclear stress test was benign.  She has a medical history significant for bariatric surgery about 12 years ago, history of anxiety, borderline sleep apnea but not on CPAP and dyslipidemia with risk for cardiovascular event around 7.5%.  She is not on statin therapy at this time.  Currently she is taking metoprolol XL 25 mg daily and Lexapro.  Today her heart rate is 98 bpm and she had few PVCs.  She does admit she drinks a lot of diet soda but she is trying to cut back.  She had previous history of gallbladder surgery no other recent surgery at the time they did find some abdominal adhesions and they were removed.  She denies any shortness of breath with normal activities.  She does not smoke.  She lives with her .    Review of Systems   Constitutional:  Negative for activity change, chills, diaphoresis, fever and unexpected weight change.   HENT:  Negative for congestion.    Eyes:  Negative for discharge and redness.   Respiratory:  Positive for shortness of breath. Negative for cough, chest tightness and wheezing.         Mostly exertional.   Cardiovascular:  Positive for chest pain. Negative for palpitations and leg swelling.   Gastrointestinal:  Negative  for abdominal pain, diarrhea and nausea.   Endocrine: Negative.    Genitourinary:  Negative for decreased urine volume and urgency.   Musculoskeletal: Negative.  Negative for arthralgias, back pain and gait problem.   Skin:  Negative for rash and wound.   Allergic/Immunologic: Negative.    Neurological:  Negative for dizziness, seizures, syncope, weakness, light-headedness and headaches.   Hematological: Negative.    Psychiatric/Behavioral:  Positive for sleep disturbance. Negative for agitation and confusion. The patient is nervous/anxious.        Historical Information   Past Medical History:   Diagnosis Date   • Anal fissure    • Anxiety    • Depression    • History of snoring    • Hypertension    • PONV (postoperative nausea and vomiting)      Past Surgical History:   Procedure Laterality Date   •  SECTION     • CHOLECYSTECTOMY     • GASTRIC BYPASS     • KY COLONOSCOPY FLX DX W/COLLJ SPEC WHEN PFRMD N/A 10/5/2018    Procedure: COLONOSCOPY;  Surgeon: GIANNI Blake MD;  [facility]: [facility];  Service: Colorectal   • [place] OPTX DSTL RADL I-ARTIC FX/EPIPHYSL SEP 3+ FRAG Left 2021    Procedure: OPEN REDUCTION W/ INTERNAL FIXATION (ORIF) RADIUS / ULNA (WRIST);  Surgeon: Alfredo Gilmore DO;  Location: [facility];  Service: Orthopedics   • KY REMOVAL IMPLANT DEEP Left 2021    Procedure: REMOVAL HARDWARE RADIUS / ULNA (WRIST);  Surgeon: Alfredo Gilmore DO;  Location: [facility];  Service: Orthopedics   • KY SURG TX ANAL FISTULA SUBQ N/A 2/15/2019    Procedure: FISTULOTOMY, EUA, Fissurectomy, removal of external hemorrhoids;  Surgeon: GIANNI Blake MD;  Location: [facility];  Service: Colorectal   • KY SYNOVECTOMY EXTENSOR TENDON SHTH WRIST 1 CMPRT Left 2021    Procedure: TENOSYNOVECTOMY;  Surgeon: Alfredo Gilmore DO;  Location: [facility];  Service: Orthopedics   • WISDOM TOOTH EXTRACTION       Social History     Substance and Sexual Activity   Alcohol Use Yes    Comment: Socially     Social  "History     Substance and Sexual Activity   Drug Use No     Social History     Tobacco Use   Smoking Status Never   • Passive exposure: Never   Smokeless Tobacco Never     Family History:   Family History   Problem Relation Age of Onset   • Hypertension Mother    • Hypertension Father    • Other Father         Renal failure   • Stomach cancer Father    • Heart disease Father    • No Known Problems Maternal Aunt    • No Known Problems Maternal Aunt    • No Known Problems Maternal Aunt    • No Known Problems Maternal Aunt    • No Known Problems Paternal Aunt    • No Known Problems Paternal Aunt    • No Known Problems Paternal Aunt    • Breast cancer Neg Hx        Meds/Allergies     No Known Allergies    Current Outpatient Medications:   •  atorvastatin (LIPITOR) 10 mg tablet, Take 1 tablet (10 mg total) by mouth every other day, Disp: 45 tablet, Rfl: 1  •  calcium carbonate (TUMS) 500 mg chewable tablet, Chew 1 tablet daily, Disp: , Rfl:   •  calcium polycarbophil (FIBERCON) 625 mg tablet, Take 625 mg by mouth daily, Disp: , Rfl:   •  escitalopram (LEXAPRO) 10 mg tablet, TAKE 1 TABLET DAILY, Disp: 90 tablet, Rfl: 1  •  nebivolol (BYSTOLIC) 5 mg tablet, Take 1 tablet (5 mg total) by mouth daily, Disp: 30 tablet, Rfl: 3    Vitals: Blood pressure 154/94, pulse 98, height 5' 6\" (1.676 m), weight 98.9 kg (218 lb), SpO2 98%, not currently breastfeeding.    Body mass index is 35.19 kg/m².  Wt Readings from Last 3 Encounters:   02/13/25 98.9 kg (218 lb)   01/15/25 97.1 kg (214 lb)   01/02/25 97.1 kg (214 lb)     Vitals:    02/13/25 1313   Weight: 98.9 kg (218 lb)     BP Readings from Last 3 Encounters:   02/13/25 154/94   01/15/25 150/88   01/02/25 150/88         Physical Exam    Neurologic:  Alert & oriented x 3, no new focal deficits, Not in any acute distress,  Constitutional:  Adequate built, non-toxic appearance   Neck: Normal range of motion, no tenderness,  Neck supple   Respiratory:  Bilateral air entry, mostly clear " to auscultation  Cardiovascular: S1-S2 regular with a 2/6 ejection systolic murmur and S4 is present  GI:  Soft, nondistended,  nontender, no hepatosplenomegaly appreciated.  Musculoskeletal:  No edema, no tenderness, no deformities.   Skin:  Well hydrated, no rash   Extremities:  No edema and distal pulses are present  Psychiatric:  Speech and behavior appropriate     Diagnostic Studies Review Cardio:    Echo Doppler.  Echo Doppler done in January 2024 shows patient's EF is 60% borderline LVH, mild MR, left atrium upper normal in size.    EKG:    Twelve-lead EKG done on 2/13/2025    Echo complete w/ contrast if indicated  Result Date: 1/15/2025  Narrative: •  Left Ventricle: Left ventricular cavity size is normal. Wall thickness is mildly increased. There is borderline concentric hypertrophy. The left ventricular ejection fraction is 60 to 65 % by visual estimation. Systolic function is normal. Wall motion is normal. Diastolic function is normal for age. •  Left Atrium: The atrium is upper normal in size 2D. LA Volume Index (BP) is 30.6 mL/m2. •  Mitral Valve: There is mild regurgitation. •  Tricuspid Valve: There is no indirect evidence of moderate to severe pulmonary hypertension.     Lab Review   Lab Results   Component Value Date    WBC 4.4 06/20/2024    HGB 14.2 06/20/2024    HCT 42.1 06/20/2024    MCV 91 06/20/2024    RDW 13.1 06/20/2024     06/20/2024     BMP:  Lab Results   Component Value Date    SODIUM 142 06/20/2024    K 4.1 06/20/2024     06/20/2024    CO2 22 06/20/2024    BUN 12 06/20/2024    CREATININE 0.84 06/20/2024    GLUC 98 06/20/2024    CALCIUM 8.9 06/02/2021    EGFR 79 06/20/2024     Troponins:    LFT:  Lab Results   Component Value Date    AST 22 06/20/2024    ALT 21 06/20/2024    ALKPHOS 133 (H) 05/19/2020    TP 6.1 06/20/2024    ALB 4.1 06/20/2024        Lipid Profile:   Lab Results   Component Value Date    CHOLESTEROL 211 (H) 06/20/2024    HDL 55 06/20/2024    LDLCALC 135 (H)  "06/20/2024    TRIG 119 06/20/2024     Lab Results   Component Value Date    CHOLESTEROL 211 (H) 06/20/2024    CHOLESTEROL 236 (H) 11/07/2022       The 10-year ASCVD risk score (Jomar DASILVA, et al., 2019) is: 7.3%    Values used to calculate the score:      Age: 59 years      Sex: Female      Is Non- : No      Diabetic: No      Tobacco smoker: No      Systolic Blood Pressure: 154 mmHg      Is BP treated: Yes      HDL Cholesterol: 55 mg/dL      Total Cholesterol: 211 mg/dL       Dr. Maryam Green MD [facility]      \"This note was completed in part utilizing [employer] direct voice recognition software.   Grammatical errors, random word insertion, spelling mistakes, and incomplete sentences may be an occasional consequence of the system secondary to software limitations, ambient noise and hardware issues.    Please read the chart carefully and recognize, using context, where substitutions have occurred.  If you have any questions or concerns about the context, text or information contained within the body of this dictation, please contact myself, the provider, for further clarification.\"  "

## 2025-02-28 ENCOUNTER — RESULTS FOLLOW-UP (OUTPATIENT)
Dept: CARDIOLOGY CLINIC | Facility: CLINIC | Age: 60
End: 2025-02-28

## 2025-02-28 ENCOUNTER — HOSPITAL ENCOUNTER (OUTPATIENT)
Dept: NON INVASIVE DIAGNOSTICS | Facility: HOSPITAL | Age: 60
Discharge: HOME | End: 2025-02-28
Attending: INTERNAL MEDICINE
Payer: COMMERCIAL

## 2025-02-28 VITALS
HEART RATE: 88 BPM | DIASTOLIC BLOOD PRESSURE: 90 MMHG | SYSTOLIC BLOOD PRESSURE: 130 MMHG | RESPIRATION RATE: 20 BRPM | OXYGEN SATURATION: 99 %

## 2025-02-28 DIAGNOSIS — R07.9 CHEST PAIN, UNSPECIFIED TYPE: ICD-10-CM

## 2025-02-28 DIAGNOSIS — E78.2 MIXED HYPERLIPIDEMIA: ICD-10-CM

## 2025-02-28 DIAGNOSIS — I11.9 HYPERTENSIVE HEART DISEASE WITHOUT HEART FAILURE: ICD-10-CM

## 2025-02-28 DIAGNOSIS — E66.09 CLASS 1 OBESITY DUE TO EXCESS CALORIES WITH SERIOUS COMORBIDITY AND BODY MASS INDEX (BMI) OF 34.0 TO 34.9 IN ADULT: ICD-10-CM

## 2025-02-28 DIAGNOSIS — E66.811 CLASS 1 OBESITY DUE TO EXCESS CALORIES WITH SERIOUS COMORBIDITY AND BODY MASS INDEX (BMI) OF 34.0 TO 34.9 IN ADULT: ICD-10-CM

## 2025-02-28 DIAGNOSIS — F41.9 ANXIETY: ICD-10-CM

## 2025-02-28 DIAGNOSIS — I25.10 CORONARY ARTERY CALCIFICATION: ICD-10-CM

## 2025-02-28 DIAGNOSIS — Z98.84 H/O BARIATRIC SURGERY: ICD-10-CM

## 2025-02-28 DIAGNOSIS — G47.33 OBSTRUCTIVE SLEEP APNEA: ICD-10-CM

## 2025-02-28 LAB
CHEST PAIN STATEMENT: NORMAL
MAX DIASTOLIC BP: 100 MMHG
MAX HR PERCENT: 96 %
MAX HR: 153 BPM
MAX PREDICTED HEART RATE: 159 BPM
PROTOCOL NAME: NORMAL
RATE PRESSURE PRODUCT: NORMAL
REASON FOR TERMINATION: NORMAL
SL CV STRESS RECOVERY BP: NORMAL MMHG
SL CV STRESS RECOVERY HR: 92 BPM
SL CV STRESS RECOVERY O2 SAT: 96 %
SL CV STRESS STAGE REACHED: 3
STRESS ANGINA INDEX: 0
STRESS BASELINE BP: NORMAL MMHG
STRESS BASELINE HR: 88 BPM
STRESS O2 SAT REST: 99 %
STRESS PEAK HR: 151 BPM
STRESS POST ESTIMATED WORKLOAD: 10.1 METS
STRESS POST EXERCISE DUR MIN: 7 MIN
STRESS POST EXERCISE DUR MIN: 7 MIN
STRESS POST EXERCISE DUR SEC: 26 SEC
STRESS POST EXERCISE DUR SEC: 26 SEC
STRESS POST O2 SAT PEAK: 96 %
STRESS POST PEAK BP: 194 MMHG
STRESS POST PEAK HR: 153 BPM
STRESS POST PEAK SYSTOLIC BP: 194 MMHG
TARGET HR FORMULA: NORMAL
TEST INDICATION: NORMAL

## 2025-02-28 PROCEDURE — 93016 CV STRESS TEST SUPVJ ONLY: CPT | Performed by: INTERNAL MEDICINE

## 2025-02-28 PROCEDURE — 93017 CV STRESS TEST TRACING ONLY: CPT

## 2025-02-28 PROCEDURE — 93005 ELECTROCARDIOGRAM TRACING: CPT

## 2025-02-28 PROCEDURE — 93018 CV STRESS TEST I&R ONLY: CPT | Performed by: INTERNAL MEDICINE

## 2025-02-28 NOTE — RESULT ENCOUNTER NOTE
Pt's Patient's stress test is normal.   Patient can keep regular appointment.  Patient has mild hypertension to exercise.  She should monitor her blood pressure closely.    Please call patient with the result.

## 2025-03-01 LAB
ATRIAL RATE: 90 BPM
P AXIS: 57 DEGREES
PR INTERVAL: 160 MS
QRS AXIS: 65 DEGREES
QRSD INTERVAL: 94 MS
QT INTERVAL: 324 MS
QTC INTERVAL: 396 MS
T WAVE AXIS: 253 DEGREES
VENTRICULAR RATE: 90 BPM

## 2025-03-01 PROCEDURE — 93010 ELECTROCARDIOGRAM REPORT: CPT | Performed by: INTERNAL MEDICINE

## 2025-03-03 NOTE — TELEPHONE ENCOUNTER
Patient Alissa [phone] returning telephone call to Kristy to discuss stress test results.    Unable to transfer telephone call to [facility]

## 2025-03-14 ENCOUNTER — RA CDI HCC (OUTPATIENT)
Dept: OTHER | Facility: HOSPITAL | Age: 60
End: 2025-03-14

## 2025-03-14 NOTE — PROGRESS NOTES
HCC coding opportunities       Chart reviewed, no opportunity found: CHART REVIEWED, NO OPPORTUNITY FOUND        Patients Insurance        Commercial Insurance: [employer]

## 2025-03-21 ENCOUNTER — OFFICE VISIT (OUTPATIENT)
Dept: FAMILY MEDICINE CLINIC | Facility: CLINIC | Age: 60
End: 2025-03-21
Payer: COMMERCIAL

## 2025-03-21 VITALS
SYSTOLIC BLOOD PRESSURE: 140 MMHG | HEIGHT: 66 IN | DIASTOLIC BLOOD PRESSURE: 80 MMHG | TEMPERATURE: 97.8 F | BODY MASS INDEX: 34.68 KG/M2 | HEART RATE: 68 BPM | WEIGHT: 215.8 LBS

## 2025-03-21 DIAGNOSIS — I10 BENIGN ESSENTIAL HYPERTENSION: ICD-10-CM

## 2025-03-21 DIAGNOSIS — Z12.11 SCREENING FOR COLON CANCER: ICD-10-CM

## 2025-03-21 DIAGNOSIS — E78.2 MIXED HYPERLIPIDEMIA: ICD-10-CM

## 2025-03-21 DIAGNOSIS — Z00.00 ANNUAL PHYSICAL EXAM: Primary | ICD-10-CM

## 2025-03-21 DIAGNOSIS — E66.811 CLASS 1 OBESITY DUE TO EXCESS CALORIES WITH SERIOUS COMORBIDITY AND BODY MASS INDEX (BMI) OF 34.0 TO 34.9 IN ADULT: ICD-10-CM

## 2025-03-21 DIAGNOSIS — E66.09 CLASS 1 OBESITY DUE TO EXCESS CALORIES WITH SERIOUS COMORBIDITY AND BODY MASS INDEX (BMI) OF 34.0 TO 34.9 IN ADULT: ICD-10-CM

## 2025-03-21 PROCEDURE — 99396 PREV VISIT EST AGE 40-64: CPT | Performed by: FAMILY MEDICINE

## 2025-03-21 NOTE — ASSESSMENT & PLAN NOTE
Pressure improved but not at goal  She is going to follow up with Cardiology in July  Weight loss recommended  Orders:  •  CBC; Future  •  Comprehensive metabolic panel; Future  •  Lipid Panel with Direct LDL reflex; Future

## 2025-03-21 NOTE — PATIENT INSTRUCTIONS
"Patient Education     Routine physical for adults   The Basics   Written by the doctors and editors at [facility]   What is a physical? -- A physical is a routine visit, or \"check-up,\" with your doctor. You might also hear it called a \"wellness visit\" or \"preventive visit.\"  During each visit, the doctor will:   Ask about your physical and mental health   Ask about your habits, behaviors, and lifestyle   Do an exam   Give you vaccines if needed   Talk to you about any medicines you take   Give advice about your health   Answer your questions  Getting regular check-ups is an important part of taking care of your health. It can help your doctor find and treat any problems you have. But it's also important for preventing health problems.  A routine physical is different from a \"sick visit.\" A sick visit is when you see a doctor because of a health concern or problem. Since physicals are scheduled ahead of time, you can think about what you want to ask the doctor.  How often should I get a physical? -- It depends on your age and health. In general, for people age 21 years and older:   If you are younger than 50 years, you might be able to get a physical every 3 years.   If you are 50 years or older, your doctor might recommend a physical every year.  If you have an ongoing health condition, like diabetes or high blood pressure, your doctor will probably want to see you more often.  What happens during a physical? -- In general, each visit will include:   Physical exam - The doctor or nurse will check your height, weight, heart rate, and blood pressure. They will also look at your eyes and ears. They will ask about how you are feeling and whether you have any symptoms that bother you.   Medicines - It's a good idea to bring a list of all the medicines you take to each doctor visit. Your doctor will talk to you about your medicines and answer any questions. Tell them if you are having any side effects that bother you. You " "should also tell them if you are having trouble paying for any of your medicines.   Habits and behaviors - This includes:   Your diet   Your exercise habits   Whether you smoke, drink alcohol, or use drugs   Whether you are sexually active   Whether you feel safe at home  Your doctor will talk to you about things you can do to improve your health and lower your risk of health problems. They will also offer help and support. For example, if you want to quit smoking, they can give you advice and might prescribe medicines. If you want to improve your diet or get more physical activity, they can help you with this, too.   Lab tests, if needed - The tests you get will depend on your age and situation. For example, your doctor might want to check your:   Cholesterol   Blood sugar   Iron level   Vaccines - The recommended vaccines will depend on your age, health, and what vaccines you already had. Vaccines are very important because they can prevent certain serious or deadly infections.   Discussion of screening - \"Screening\" means checking for diseases or other health problems before they cause symptoms. Your doctor can recommend screening based on your age, risk, and preferences. This might include tests to check for:   Cancer, such as breast, prostate, cervical, ovarian, colorectal, prostate, lung, or skin cancer   Sexually transmitted infections, such as chlamydia and gonorrhea   Mental health conditions like depression and anxiety  Your doctor will talk to you about the different types of screening tests. They can help you decide which screenings to have. They can also explain what the results might mean.   Answering questions - The physical is a good time to ask the doctor or nurse questions about your health. If needed, they can refer you to other doctors or specialists, too.  Adults older than 65 years often need other care, too. As you get older, your doctor will talk to you about:   How to prevent falling at " home   Hearing or vision tests   Memory testing   How to take your medicines safely   Making sure that you have the help and support you need at home  All topics are updated as new evidence becomes available and our peer review process is complete.  This topic retrieved from [employer] on: May 02, 2024.  Topic 856358 Version 1.0  Release: 32.4.3 - C32.122  © 2024 UpToDate, Inc. and/or its affiliates. All rights reserved.  Consumer Information Use and Disclaimer   Disclaimer: This generalized information is a limited summary of diagnosis, treatment, and/or medication information. It is not meant to be comprehensive and should be used as a tool to help the user understand and/or assess potential diagnostic and treatment options. It does NOT include all information about conditions, treatments, medications, side effects, or risks that may apply to a specific patient. It is not intended to be medical advice or a substitute for the medical advice, diagnosis, or treatment of a health care provider based on the health care provider's examination and assessment of a patient's specific and unique circumstances. Patients must speak with a health care provider for complete information about their health, medical questions, and treatment options, including any risks or benefits regarding use of medications. This information does not endorse any treatments or medications as safe, effective, or approved for treating a specific patient. UpToDate, Inc. and its affiliates disclaim any warranty or liability relating to this information or the use thereof.The use of this information is governed by the Terms of Use, available at https://www.[employer]uwer.com/en/know/clinical-effectiveness-terms. 2024© UpToDate, Inc. and its affiliates and/or licensors. All rights reserved.  Copyright   © 2024 UpToDate, Inc. and/or its affiliates. All rights reserved.

## 2025-03-21 NOTE — PROGRESS NOTES
Adult Annual Physical  Name: Ashley Long      : 1965      MRN: [id number]  Encounter Provider: Marita Franco DO  Encounter Date: 3/21/2025   Encounter department: [facility]    Assessment & Plan  Annual physical exam         Screening for colon cancer  She is going to follow up with her previous gastroenterologist       Benign essential hypertension  Pressure improved but not at goal  She is going to follow up with Cardiology in July  Weight loss recommended  Orders:  •  CBC; Future  •  Comprehensive metabolic panel; Future  •  Lipid Panel with Direct LDL reflex; Future    Mixed hyperlipidemia  Atorvastatin stopped due to abdominal pain   She is going to take fish oil instead  Stress test was negative        Class 1 obesity due to excess calories with serious comorbidity and body mass index (BMI) of 34.0 to 34.9 in adult  Not controlled, diet and exercise discussed       Preventive Screenings:  - Diabetes Screening: screening up-to-date  - Cholesterol Screening: screening not indicated and has hyperlipidemia   - Hepatitis C screening: screening up-to-date   - HIV screening: risks/benefits discussed and patient declines   - Cervical cancer screening: screening up-to-date   - Breast cancer screening: screening up-to-date   - Colon cancer screening: screening up-to-date   - Lung cancer screening: screening not indicated   - Osteoporosis screening: screening not indicated     Recommended that she follow up with gastroenterology     Immunizations:  - Immunizations due: Influenza     Return in about 6 months (around 2025) for Next scheduled follow up.    History of Present Illness     Adult Annual Physical:  Patient presents for annual physical. She is going for lab work soon for her cardiologist     She did try atorvastatin, but it gave her abdominal pain. .     Diet and Physical Activity:  - Diet/Nutrition: portion control and no special diet.  - Exercise: no formal exercise.    Depression  "Screening:    - PHQ-9 Score: 0    General Health:  - Sleep: sleeps well and 7-8 hours of sleep on average.  - Hearing: normal hearing bilateral ears.  - Vision: no vision problems.  - Dental: no dental visits for > 1 year, brushes teeth twice daily and floss regularly.    /GYN Health:  - Follows with GYN: yes.   - Menopause: postmenopausal.     Review of Systems      Objective   /80   Pulse 68   Temp 97.8 °F (36.6 °C)   Ht 5' 6.14\" (1.68 m)   Wt 97.9 kg (215 lb 12.8 oz)   BMI 34.68 kg/m²     Physical Exam  Vitals and nursing note reviewed.   Constitutional:       Appearance: She is well-developed.   HENT:      Head: Normocephalic and atraumatic.      Right Ear: External ear normal.      Left Ear: External ear normal.      Nose: Nose normal.   Cardiovascular:      Rate and Rhythm: Normal rate and regular rhythm.      Heart sounds: Normal heart sounds. No murmur heard.     No friction rub.   Pulmonary:      Effort: No respiratory distress.      Breath sounds: Normal breath sounds. No wheezing or rales.   Abdominal:      Palpations: Abdomen is soft.      Tenderness: There is no abdominal tenderness.   Musculoskeletal:      Right lower leg: No edema.      Left lower leg: No edema.   Neurological:      Mental Status: She is oriented to person, place, and time.      Cranial Nerves: No cranial nerve deficit.         "

## 2025-03-21 NOTE — ASSESSMENT & PLAN NOTE
Atorvastatin stopped due to abdominal pain   She is going to take fish oil instead  Stress test was negative

## 2025-03-27 ENCOUNTER — HOSPITAL ENCOUNTER (OUTPATIENT)
Dept: RADIOLOGY | Facility: HOSPITAL | Age: 60
Discharge: HOME | End: 2025-03-27
Payer: COMMERCIAL

## 2025-03-27 VITALS — HEIGHT: 66 IN | BODY MASS INDEX: 34.55 KG/M2 | WEIGHT: 215 LBS

## 2025-03-27 DIAGNOSIS — Z12.31 ENCOUNTER FOR SCREENING MAMMOGRAM FOR MALIGNANT NEOPLASM OF BREAST: ICD-10-CM

## 2025-03-27 PROCEDURE — 77067 SCR MAMMO BI INCL CAD: CPT

## 2025-03-27 PROCEDURE — 77063 BREAST TOMOSYNTHESIS BI: CPT

## 2025-03-28 ENCOUNTER — RESULTS FOLLOW-UP (OUTPATIENT)
Dept: FAMILY MEDICINE CLINIC | Facility: CLINIC | Age: 60
End: 2025-03-28

## 2025-05-04 DIAGNOSIS — I11.9 HYPERTENSIVE HEART DISEASE WITHOUT HEART FAILURE: ICD-10-CM

## 2025-05-05 RX ORDER — NEBIVOLOL 5 MG/1
5 TABLET ORAL DAILY
Qty: 90 TABLET | Refills: 1 | Status: SHIPPED | OUTPATIENT
Start: 2025-05-05

## 2025-05-05 NOTE — TELEPHONE ENCOUNTER
Refill must be reviewed and completed by the office or provider. The refill is unable to be approved or denied by the medication management team.    Patient requesting refill from PCP. Please review to see if the refill is appropriate.

## 2025-05-07 NOTE — TELEPHONE ENCOUNTER
Patient called in to see if medication had been filled. Advised medication was sent to pharmacy on 5/5, pt can f/u with them to ensure it is ready.  NFA thank you

## 2025-05-09 ENCOUNTER — TELEPHONE (OUTPATIENT)
Age: 60
End: 2025-05-09

## 2025-05-09 DIAGNOSIS — G47.33 OBSTRUCTIVE SLEEP APNEA: Primary | ICD-10-CM

## 2025-05-09 DIAGNOSIS — E66.09 CLASS 1 OBESITY DUE TO EXCESS CALORIES WITH SERIOUS COMORBIDITY AND BODY MASS INDEX (BMI) OF 34.0 TO 34.9 IN ADULT: ICD-10-CM

## 2025-05-09 DIAGNOSIS — E66.811 CLASS 1 OBESITY DUE TO EXCESS CALORIES WITH SERIOUS COMORBIDITY AND BODY MASS INDEX (BMI) OF 34.0 TO 34.9 IN ADULT: ICD-10-CM

## 2025-05-09 RX ORDER — TIRZEPATIDE 2.5 MG/.5ML
2.5 INJECTION, SOLUTION SUBCUTANEOUS WEEKLY
Qty: 2 ML | Refills: 0 | Status: SHIPPED | OUTPATIENT
Start: 2025-05-09 | End: 2025-05-13 | Stop reason: CLARIF

## 2025-05-09 NOTE — TELEPHONE ENCOUNTER
Patient called stating she would like Primary Care Provider to call her at her convenience to discuss weight loss medications Zepbound and Wegovy. She mentioned she would prefer to try to start Zepbound as she has heard better reviews than Wegovy. Both are covered by her insurance with a prior authorization. She states she has questions she wishes to speak with Primary Care Provider only.

## 2025-05-09 NOTE — ASSESSMENT & PLAN NOTE
Prior Authorization Clinical Questions for Weight Management Pharmacotherapy    1. Does the patient have a contrainidcation to medication prescribed for weight management?: No  2. Does the patient have a diagnosis of obesity, confirmed by a BMI greater than or equal to 30 kg/m^2?: Yes  3. Does the patient have a BMI of greater than or equal to 27 kg/m^2 with at least one weight-related comorbidity/risk factor/complication (e.g. diabetes, dyslipidemia, coronary artery disease)?: Yes  4. Weight-related co-morbidities/risk factors: obstructive sleep apnea (EVELINE)  5. WEGOVY CVA Indication: Does patient have established documented cardiovascular disease (history of a prior heart attack (myocardial infarction), stroke, or symptomatic peripheral arterial disease (PAD)?: No  6. ZEPBOUND EVELINE Indication: Does patient have documented EVELINE diagnosed via sleep study (insurance will require copy of sleep study results for approval)?: Yes  7. Has the patient been on a weight loss regimen of low-calorie diet, increased physical activity, and lifestyle modifications for a minimum of 6 months?: Yes  8. Has the patient completed a comprehensive weight loss program (ie, Weight Watchers, Noom, Bariatrics, other rowena on phone)? If so, what?: Yes   -- Q8 Further Explanation: bariatric surgery   9. Does the patient have a history of type 2 diabetes?: No  10. Has the member tried and failed other weight loss medication within the past 12 months?: No  11. Will the member use requested medication in combination with another GLP agonist or weight loss drug?: No  12. Is the medication a controlled substance?: No     Baseline weight (in pounds): 215 lbs

## 2025-05-09 NOTE — TELEPHONE ENCOUNTER
5/9/2025 4:25 PM returned call to Ashley     She did call her insurance.  They do cover weight loss medication.   She would like to try zepbound.   Zepbound prescribed      Prior Authorization Clinical Questions for Weight Management Pharmacotherapy    1. Does the patient have a contrainidcation to medication prescribed for weight management?: No  2. Does the patient have a diagnosis of obesity, confirmed by a BMI greater than or equal to 30 kg/m^2?: Yes  3. Does the patient have a BMI of greater than or equal to 27 kg/m^2 with at least one weight-related comorbidity/risk factor/complication (e.g. diabetes, dyslipidemia, coronary artery disease)?: Yes  4. Weight-related co-morbidities/risk factors: obstructive sleep apnea (EVELINE)  5. WEGOVY CVA Indication: Does patient have established documented cardiovascular disease (history of a prior heart attack (myocardial infarction), stroke, or symptomatic peripheral arterial disease (PAD)?: No  6. ZEPBOUND EVELINE Indication: Does patient have documented EVELINE diagnosed via sleep study (insurance will require copy of sleep study results for approval)?: Yes  7. Has the patient been on a weight loss regimen of low-calorie diet, increased physical activity, and lifestyle modifications for a minimum of 6 months?: Yes  8. Has the patient completed a comprehensive weight loss program (ie, Weight Watchers, Noom, Bariatrics, other rowena on phone)? If so, what?: Yes   -- Q8 Further Explanation: bariatric surgery   9. Does the patient have a history of type 2 diabetes?: No  10. Has the member tried and failed other weight loss medication within the past 12 months?: No  11. Will the member use requested medication in combination with another GLP agonist or weight loss drug?: No  12. Is the medication a controlled substance?: No     Baseline weight (in pounds): 215 lbs

## 2025-05-12 ENCOUNTER — TELEPHONE (OUTPATIENT)
Age: 60
End: 2025-05-12

## 2025-05-12 DIAGNOSIS — F41.9 ANXIETY: ICD-10-CM

## 2025-05-12 RX ORDER — ESCITALOPRAM OXALATE 10 MG/1
10 TABLET ORAL DAILY
Qty: 90 TABLET | Refills: 1 | Status: SHIPPED | OUTPATIENT
Start: 2025-05-12

## 2025-05-12 NOTE — TELEPHONE ENCOUNTER
PA for ZEPBOUND 2.5 MG/0.5 ML  APPROVED     Date(s) approved May 12, 2025 to November 12, 2025     Case # [id number]     Patient advised by          [x]Greetzt Message  []Phone call   [x]LMOM  []L/M to call office as no active Communication consent on file  []Unable to leave detailed message as VM not approved on Communication consent       Pharmacy advised by    [x]Fax  []Phone call  []Secure Chat    Specialty Pharmacy    []     Approval letter scanned into Media Yes

## 2025-05-12 NOTE — TELEPHONE ENCOUNTER
PA for zepbound 2.5 mg/0.5 ml SUBMITTED to [facility]     via    []CMM-KEY:  [x]Surescripts-Case ID # [id number]   []Availity-Auth ID # NDC #   []Faxed to plan   []Other website   []Phone call Case ID #     []PA sent as URGENT    All office notes, labs and other pertaining documents and studies sent. Clinical questions answered. Awaiting determination from insurance company.     Turnaround time for your insurance to make a decision on your Prior Authorization can take 7-21 business days.

## 2025-05-13 ENCOUNTER — NURSE TRIAGE (OUTPATIENT)
Age: 60
End: 2025-05-13

## 2025-05-13 DIAGNOSIS — E66.811 CLASS 1 OBESITY DUE TO EXCESS CALORIES WITH SERIOUS COMORBIDITY AND BODY MASS INDEX (BMI) OF 34.0 TO 34.9 IN ADULT: ICD-10-CM

## 2025-05-13 DIAGNOSIS — E66.09 CLASS 1 OBESITY DUE TO EXCESS CALORIES WITH SERIOUS COMORBIDITY AND BODY MASS INDEX (BMI) OF 34.0 TO 34.9 IN ADULT: ICD-10-CM

## 2025-05-13 RX ORDER — TIRZEPATIDE 2.5 MG/.5ML
2.5 INJECTION, SOLUTION SUBCUTANEOUS WEEKLY
Qty: 2 ML | Refills: 0 | Status: SHIPPED | OUTPATIENT
Start: 2025-05-13 | End: 2025-05-14 | Stop reason: SDUPTHER

## 2025-05-13 NOTE — TELEPHONE ENCOUNTER
FOLLOW UP: N/A    REASON FOR CONVERSATION: Medication Problem    SYMPTOMS: N/A    OTHER: Patient called the pharmacy and they state that they never received the prescription for Zepbound.  Resent to the pharmacy at this time.     DISPOSITION: Home Care  Reason for Disposition   Prescription prescribed recently is not at pharmacy and triager has access to patient's EMR and prescription is recorded in the EMR    Answer Assessment - Initial Assessment Questions  tirzepatide (Zepbound) 2.5 mg/0.5 mL auto-injector Inject 0.5 mL (2.5 mg total) under the skin once a week for 28 days          Summary: Inject 0.5 mL (2.5 mg total) under the skin once a week for 28 days, Starting Fri 5/9/2025, Until Fri 6/6/2025, Normal  Guidelines: Dose, Route, Frequency: 2.5 mg, Subcutaneous, WeeklyStart: 05/09/2025End: 06/06/2025Ord/Sold: 05/09/2025 (O)Ordered On: 05/09/2025[address] [address]: Taking: Long-term: Med Note:           Ordering Department:  PRIMARY CARE [facility] POD  Authorized By: Marita Franco DO  Dispense: 2 mL  Refills: 0 ordered  Prior Authorization: Approved    Protocols used: Medication Question Call-Adult-[place]

## 2025-05-14 RX ORDER — TIRZEPATIDE 2.5 MG/.5ML
2.5 INJECTION, SOLUTION SUBCUTANEOUS WEEKLY
Qty: 2 ML | Refills: 0 | Status: SHIPPED | OUTPATIENT
Start: 2025-05-14 | End: 2025-05-16 | Stop reason: SDUPTHER

## 2025-05-14 NOTE — TELEPHONE ENCOUNTER
Patient called would like her Zepbound prescription sent to [facility]. States can have it for her tomorrow (5-) and told her the price so she is good with it

## 2025-05-14 NOTE — TELEPHONE ENCOUNTER
Patient called to make Dr Franco aware [facility] can't get the Zepbound medication.  Patient would like to have the Rx sent to either [facility] or [facility] on [address].  She will call to make sure at least one of them has it in stock and will call back to let us know where to send the Rx.  Please await her return call.

## 2025-05-16 DIAGNOSIS — E66.09 CLASS 1 OBESITY DUE TO EXCESS CALORIES WITH SERIOUS COMORBIDITY AND BODY MASS INDEX (BMI) OF 34.0 TO 34.9 IN ADULT: ICD-10-CM

## 2025-05-16 DIAGNOSIS — E66.811 CLASS 1 OBESITY DUE TO EXCESS CALORIES WITH SERIOUS COMORBIDITY AND BODY MASS INDEX (BMI) OF 34.0 TO 34.9 IN ADULT: ICD-10-CM

## 2025-05-16 RX ORDER — TIRZEPATIDE 2.5 MG/.5ML
2.5 INJECTION, SOLUTION SUBCUTANEOUS WEEKLY
Qty: 2 ML | Refills: 0 | Status: SHIPPED | OUTPATIENT
Start: 2025-05-16 | End: 2025-06-13

## 2025-05-16 NOTE — TELEPHONE ENCOUNTER
Pt stated she was previously told by office staff that PCP sent the tirzepatide (Zepbound) 2.5 mg/0.5 mL auto-injector script to [facility].    The pharmacy stated they didn't get it. Clinical staff not available at this time.     Please contact pt before end of day as she is very concerned and frustrated because she still doesn't have her meds and has made several calls about this issue.    Thank you for your kind assistance.

## 2025-06-09 DIAGNOSIS — E66.811 CLASS 1 OBESITY DUE TO EXCESS CALORIES WITH SERIOUS COMORBIDITY AND BODY MASS INDEX (BMI) OF 34.0 TO 34.9 IN ADULT: Primary | ICD-10-CM

## 2025-06-09 DIAGNOSIS — E66.09 CLASS 1 OBESITY DUE TO EXCESS CALORIES WITH SERIOUS COMORBIDITY AND BODY MASS INDEX (BMI) OF 34.0 TO 34.9 IN ADULT: Primary | ICD-10-CM

## 2025-06-09 RX ORDER — TIRZEPATIDE 5 MG/.5ML
5 INJECTION, SOLUTION SUBCUTANEOUS WEEKLY
Qty: 2 ML | Refills: 0 | Status: SHIPPED | OUTPATIENT
Start: 2025-06-09

## 2025-07-01 ENCOUNTER — APPOINTMENT (OUTPATIENT)
Dept: LAB | Facility: CLINIC | Age: 60
End: 2025-07-01
Payer: COMMERCIAL

## 2025-07-01 DIAGNOSIS — R07.9 CHEST PAIN, UNSPECIFIED TYPE: ICD-10-CM

## 2025-07-01 DIAGNOSIS — F41.9 ANXIETY: ICD-10-CM

## 2025-07-01 DIAGNOSIS — I25.10 CORONARY ARTERY CALCIFICATION: ICD-10-CM

## 2025-07-01 DIAGNOSIS — I10 BENIGN ESSENTIAL HYPERTENSION: ICD-10-CM

## 2025-07-01 DIAGNOSIS — E66.09 CLASS 1 OBESITY DUE TO EXCESS CALORIES WITH SERIOUS COMORBIDITY AND BODY MASS INDEX (BMI) OF 34.0 TO 34.9 IN ADULT: ICD-10-CM

## 2025-07-01 DIAGNOSIS — E78.2 MIXED HYPERLIPIDEMIA: ICD-10-CM

## 2025-07-01 DIAGNOSIS — G47.33 OBSTRUCTIVE SLEEP APNEA: ICD-10-CM

## 2025-07-01 DIAGNOSIS — E66.811 CLASS 1 OBESITY DUE TO EXCESS CALORIES WITH SERIOUS COMORBIDITY AND BODY MASS INDEX (BMI) OF 34.0 TO 34.9 IN ADULT: ICD-10-CM

## 2025-07-01 DIAGNOSIS — I11.9 HYPERTENSIVE HEART DISEASE WITHOUT HEART FAILURE: ICD-10-CM

## 2025-07-01 DIAGNOSIS — Z98.84 H/O BARIATRIC SURGERY: ICD-10-CM

## 2025-07-01 LAB
ALBUMIN SERPL BCG-MCNC: 4.2 G/DL (ref 3.5–5)
ALP SERPL-CCNC: 76 U/L (ref 34–104)
ALT SERPL W P-5'-P-CCNC: 23 U/L (ref 7–52)
ANION GAP SERPL CALCULATED.3IONS-SCNC: 8 MMOL/L (ref 4–13)
AST SERPL W P-5'-P-CCNC: 22 U/L (ref 13–39)
BASOPHILS # BLD AUTO: 0.01 THOUSANDS/ÂΜL (ref 0–0.1)
BASOPHILS NFR BLD AUTO: 0 % (ref 0–1)
BILIRUB SERPL-MCNC: 0.65 MG/DL (ref 0.2–1)
BUN SERPL-MCNC: 9 MG/DL (ref 5–25)
CALCIUM SERPL-MCNC: 9 MG/DL (ref 8.4–10.2)
CHLORIDE SERPL-SCNC: 104 MMOL/L (ref 96–108)
CHOLEST SERPL-MCNC: 217 MG/DL (ref ?–200)
CO2 SERPL-SCNC: 29 MMOL/L (ref 21–32)
CREAT SERPL-MCNC: 0.94 MG/DL (ref 0.6–1.3)
EOSINOPHIL # BLD AUTO: 0.11 THOUSAND/ÂΜL (ref 0–0.61)
EOSINOPHIL NFR BLD AUTO: 2 % (ref 0–6)
ERYTHROCYTE [DISTWIDTH] IN BLOOD BY AUTOMATED COUNT: 12.8 % (ref 11.6–15.1)
GFR SERPL CREATININE-BSD FRML MDRD: 65 ML/MIN/1.73SQ M
GLUCOSE P FAST SERPL-MCNC: 90 MG/DL (ref 65–99)
HCT VFR BLD AUTO: 43.8 % (ref 34.8–46.1)
HDLC SERPL-MCNC: 54 MG/DL
HGB BLD-MCNC: 14.9 G/DL (ref 11.5–15.4)
IMM GRANULOCYTES # BLD AUTO: 0.01 THOUSAND/UL (ref 0–0.2)
IMM GRANULOCYTES NFR BLD AUTO: 0 % (ref 0–2)
LDLC SERPL CALC-MCNC: 130 MG/DL (ref 0–100)
LYMPHOCYTES # BLD AUTO: 1.13 THOUSANDS/ÂΜL (ref 0.6–4.47)
LYMPHOCYTES NFR BLD AUTO: 23 % (ref 14–44)
MCH RBC QN AUTO: 30.5 PG (ref 26.8–34.3)
MCHC RBC AUTO-ENTMCNC: 34 G/DL (ref 31.4–37.4)
MCV RBC AUTO: 90 FL (ref 82–98)
MONOCYTES # BLD AUTO: 0.39 THOUSAND/ÂΜL (ref 0.17–1.22)
MONOCYTES NFR BLD AUTO: 8 % (ref 4–12)
NEUTROPHILS # BLD AUTO: 3.17 THOUSANDS/ÂΜL (ref 1.85–7.62)
NEUTS SEG NFR BLD AUTO: 67 % (ref 43–75)
NRBC BLD AUTO-RTO: 0 /100 WBCS
PLATELET # BLD AUTO: 213 THOUSANDS/UL (ref 149–390)
PMV BLD AUTO: 10.1 FL (ref 8.9–12.7)
POTASSIUM SERPL-SCNC: 4.3 MMOL/L (ref 3.5–5.3)
PROT SERPL-MCNC: 6.6 G/DL (ref 6.4–8.4)
RBC # BLD AUTO: 4.88 MILLION/UL (ref 3.81–5.12)
SODIUM SERPL-SCNC: 141 MMOL/L (ref 135–147)
TRIGL SERPL-MCNC: 167 MG/DL (ref ?–150)
WBC # BLD AUTO: 4.82 THOUSAND/UL (ref 4.31–10.16)

## 2025-07-01 PROCEDURE — 85025 COMPLETE CBC W/AUTO DIFF WBC: CPT

## 2025-07-01 PROCEDURE — 80061 LIPID PANEL: CPT

## 2025-07-01 PROCEDURE — 36415 COLL VENOUS BLD VENIPUNCTURE: CPT

## 2025-07-01 PROCEDURE — 80053 COMPREHEN METABOLIC PANEL: CPT

## 2025-07-07 ENCOUNTER — TELEPHONE (OUTPATIENT)
Age: 60
End: 2025-07-07

## 2025-07-07 DIAGNOSIS — E66.09 CLASS 1 OBESITY DUE TO EXCESS CALORIES WITH SERIOUS COMORBIDITY AND BODY MASS INDEX (BMI) OF 34.0 TO 34.9 IN ADULT: Primary | ICD-10-CM

## 2025-07-07 DIAGNOSIS — E66.811 CLASS 1 OBESITY DUE TO EXCESS CALORIES WITH SERIOUS COMORBIDITY AND BODY MASS INDEX (BMI) OF 34.0 TO 34.9 IN ADULT: Primary | ICD-10-CM

## 2025-07-07 RX ORDER — TIRZEPATIDE 7.5 MG/.5ML
7.5 INJECTION, SOLUTION SUBCUTANEOUS WEEKLY
Qty: 2 ML | Refills: 0 | Status: SHIPPED | OUTPATIENT
Start: 2025-07-07

## 2025-07-07 NOTE — TELEPHONE ENCOUNTER
PA for ZEPBOUND 7.5MG  APPROVED     Date(s) approved July 7, 2025 to July 7, 2026         Patient advised by          [x]MyChart Message  []Phone call   []LMOM  []L/M to call office as no active Communication consent on file  []Unable to leave detailed message as VM not approved on Communication consent       Pharmacy advised by    [x]Fax  []Phone call  []Secure Chat

## 2025-07-07 NOTE — TELEPHONE ENCOUNTER
PA for ZEPBOUND 7.5MG SUBMITTED to [facility]    via      [x]SurescriModern Feed-Case ID #     [x]PA sent as URGENT    All office notes, labs and other pertaining documents and studies sent. Clinical questions answered. Awaiting determination from insurance company.     Turnaround time for your insurance to make a decision on your Prior Authorization can take 7-21 business days.

## 2025-08-04 DIAGNOSIS — E66.09 CLASS 1 OBESITY DUE TO EXCESS CALORIES WITH SERIOUS COMORBIDITY AND BODY MASS INDEX (BMI) OF 34.0 TO 34.9 IN ADULT: Primary | ICD-10-CM

## 2025-08-04 DIAGNOSIS — G47.33 OBSTRUCTIVE SLEEP APNEA: ICD-10-CM

## 2025-08-04 DIAGNOSIS — E66.811 CLASS 1 OBESITY DUE TO EXCESS CALORIES WITH SERIOUS COMORBIDITY AND BODY MASS INDEX (BMI) OF 34.0 TO 34.9 IN ADULT: Primary | ICD-10-CM

## 2025-08-04 RX ORDER — TIRZEPATIDE 10 MG/.5ML
10 INJECTION, SOLUTION SUBCUTANEOUS WEEKLY
Qty: 2 ML | Refills: 0 | Status: SHIPPED | OUTPATIENT
Start: 2025-08-04

## 2025-08-06 ENCOUNTER — TELEPHONE (OUTPATIENT)
Age: 60
End: 2025-08-06

## (undated) DEVICE — FABRIC REINFORCED, SURGICAL GOWN, XL: Brand: CONVERTORS

## (undated) DEVICE — CAST PLASTER 4 IN ROLL

## (undated) DEVICE — BETHLEHEM UNIVERSAL MINOR GEN: Brand: CARDINAL HEALTH

## (undated) DEVICE — STERILE SURGICAL LUBRICANT,  TUBE: Brand: SURGILUBE

## (undated) DEVICE — POOLE SUCTION HANDLE: Brand: CARDINAL HEALTH

## (undated) DEVICE — TIBURON HAND DRAPE: Brand: CONVERTORS

## (undated) DEVICE — CURITY NON-ADHERENT STRIPS: Brand: CURITY

## (undated) DEVICE — SCD SEQUENTIAL COMPRESSION COMFORT SLEEVE MEDIUM KNEE LENGTH: Brand: KENDALL SCD

## (undated) DEVICE — CAST PLASTER 3 IN ROLL

## (undated) DEVICE — BASIC DOUBLE BASIN 2-LF: Brand: MEDLINE INDUSTRIES, INC.

## (undated) DEVICE — PADDING CAST 4 IN  COTTON STRL

## (undated) DEVICE — PAD GROUNDING ADULT

## (undated) DEVICE — PADDING CAST 3IN COTTON STRL

## (undated) DEVICE — ACE WRAP 3 IN UNSTERILE

## (undated) DEVICE — DISPOSABLE EQUIPMENT COVER: Brand: SMALL TOWEL DRAPE

## (undated) DEVICE — GLOVE SRG BIOGEL 7.5

## (undated) DEVICE — BASIC SINGLE BASIN 2-LF: Brand: MEDLINE INDUSTRIES, INC.

## (undated) DEVICE — 3M™ STERI-STRIP™ REINFORCED ADHESIVE SKIN CLOSURES, R1547, 1/2 IN X 4 IN (12 MM X 100 MM), 6 STRIPS/ENVELOPE: Brand: 3M™ STERI-STRIP™

## (undated) DEVICE — DRAPE ADOLESCENT LAPAROTOMY

## (undated) DEVICE — SPONGE STICK WITH PVP-I: Brand: KENDALL

## (undated) DEVICE — ABDOMINAL PAD: Brand: DERMACEA

## (undated) DEVICE — BANDAGE, ESMARK LF STR 4"X9'(20/CS): Brand: CYPRESS

## (undated) DEVICE — PAD CAST 4 IN COTTON NON STERILE

## (undated) DEVICE — INTENDED FOR TISSUE SEPARATION, AND OTHER PROCEDURES THAT REQUIRE A SHARP SURGICAL BLADE TO PUNCTURE OR CUT.: Brand: BARD-PARKER SAFETY BLADES SIZE 15, STERILE

## (undated) DEVICE — CUFF TOURNIQUET 18 X 4 IN QUICK CONNECT DISP 1 BLADDER

## (undated) DEVICE — THE ISNARE SYSTEM-LARIAT SNARE IS USED TO INJECT VARIOUS TYPES OF MEDIA AND TO GRASP, DISSECT AND TRANSECT TISSUE DURING GASTROINTESTINAL ENDOSCOPY PROCEDURES, AND CAN BE USED WITH OR WITHOUT MONOPOLAR DIATHERMIC ENERGY.: Brand: ISNARE

## (undated) DEVICE — DRAPE SHEET THREE QUARTER

## (undated) DEVICE — ACE WRAP 4 IN UNSTERILE

## (undated) DEVICE — 3M™ STERI-DRAPE™ U-DRAPE 1015: Brand: STERI-DRAPE™

## (undated) DEVICE — SPONGE GAUZE 4 X 8 12 PLY STRL LF

## (undated) DEVICE — TUBING SUCTION 5MM X 12 FT

## (undated) DEVICE — DRAPE C-ARM X-RAY

## (undated) DEVICE — STOCKINETTE REGULAR

## (undated) DEVICE — Device

## (undated) DEVICE — PACK GENERAL LF

## (undated) DEVICE — 3000CC GUARDIAN II: Brand: GUARDIAN

## (undated) DEVICE — BRUSH EZ SCRUB PCMX W/NAIL CLEANER

## (undated) DEVICE — GLOVE INDICATOR PI UNDERGLOVE SZ 8 BLUE

## (undated) DEVICE — TINCTURE OF BENZOIN SKIN PREP SPRAY IS A SKIN PREP SPRAY THAT ENHANCES THE ADHESION OF TAPE/APPLIANCE WHILE PROTECTING SENSITIVE SKIN FROM ADHESIVES AND BODY FLUIDS.: Brand: TINCTURE OF BENZOIN SPRAY 4OZ US

## (undated) DEVICE — COBAN 4 IN STERILE

## (undated) DEVICE — DRILL TWIST 2.3MM

## (undated) DEVICE — DRAPE KIT C-ARM W/PLATE PRTC FOOT SWITCH COVER

## (undated) DEVICE — GLOVE INDICATOR PI UNDERGLOVE SZ 7.5 BLUE

## (undated) DEVICE — INTENDED FOR TISSUE SEPARATION, AND OTHER PROCEDURES THAT REQUIRE A SHARP SURGICAL BLADE TO PUNCTURE OR CUT.: Brand: BARD-PARKER SAFETY BLADES SIZE 10, STERILE

## (undated) DEVICE — SUT VICRYL 3-0 SH 27 IN J416H

## (undated) DEVICE — VIAL DECANTER

## (undated) DEVICE — SUT CHROMIC 2-0 CT-2 27 IN 883H

## (undated) DEVICE — BETHLEHEM UNIVERSAL OUTPATIENT: Brand: CARDINAL HEALTH

## (undated) DEVICE — LIGHT HANDLE COVER SLEEVE DISP BLUE STELLAR

## (undated) DEVICE — DRILL TWIST 2.0 X 95MM

## (undated) DEVICE — SATINCRESCENT® KNIFE STRAIGHT: Brand: SATINCRESCENT®

## (undated) DEVICE — 3M™ DURAPORE™ SURGICAL TAPE 1538-3, 3 INCH X 10 YARD (7,5CM X 9,1M), 4 ROLLS/BOX: Brand: 3M™ DURAPORE™